# Patient Record
Sex: FEMALE | Race: WHITE | NOT HISPANIC OR LATINO | Employment: UNEMPLOYED | URBAN - METROPOLITAN AREA
[De-identification: names, ages, dates, MRNs, and addresses within clinical notes are randomized per-mention and may not be internally consistent; named-entity substitution may affect disease eponyms.]

---

## 2017-05-08 ENCOUNTER — HOSPITAL ENCOUNTER (OUTPATIENT)
Dept: OTHER | Facility: HOSPITAL | Age: 42
Setting detail: SPECIMEN
Discharge: HOME | End: 2017-05-08
Attending: INTERNAL MEDICINE | Admitting: INTERNAL MEDICINE

## 2019-08-20 ENCOUNTER — TRANSCRIBE ORDERS (OUTPATIENT)
Dept: PHYSICAL THERAPY | Facility: CLINIC | Age: 44
End: 2019-08-20

## 2019-08-20 DIAGNOSIS — M54.5 LOW BACK PAIN, UNSPECIFIED BACK PAIN LATERALITY, UNSPECIFIED CHRONICITY, WITH SCIATICA PRESENCE UNSPECIFIED: ICD-10-CM

## 2019-08-20 DIAGNOSIS — M54.2 PAIN, NECK: Primary | ICD-10-CM

## 2019-08-21 ENCOUNTER — HOSPITAL ENCOUNTER (EMERGENCY)
Facility: HOSPITAL | Age: 44
Discharge: LEFT BEFORE DISCHARGE | End: 2019-08-21
Attending: EMERGENCY MEDICINE | Admitting: EMERGENCY MEDICINE

## 2019-08-21 ENCOUNTER — APPOINTMENT (OUTPATIENT)
Dept: GENERAL RADIOLOGY | Facility: HOSPITAL | Age: 44
End: 2019-08-21

## 2019-08-21 VITALS
HEART RATE: 72 BPM | BODY MASS INDEX: 21.45 KG/M2 | HEIGHT: 63 IN | DIASTOLIC BLOOD PRESSURE: 64 MMHG | TEMPERATURE: 97.8 F | OXYGEN SATURATION: 100 % | RESPIRATION RATE: 17 BRPM | SYSTOLIC BLOOD PRESSURE: 104 MMHG | WEIGHT: 121.03 LBS

## 2019-08-21 DIAGNOSIS — R53.1 WEAKNESS: Primary | ICD-10-CM

## 2019-08-21 DIAGNOSIS — E87.20 METABOLIC ACIDOSIS: ICD-10-CM

## 2019-08-21 DIAGNOSIS — E87.6 HYPOKALEMIA: ICD-10-CM

## 2019-08-21 LAB
ALBUMIN SERPL-MCNC: 3.2 G/DL (ref 3.5–4.8)
ALBUMIN/GLOB SERPL: 1 G/DL (ref 1–1.7)
ALP SERPL-CCNC: 71 U/L (ref 32–91)
ALT SERPL W P-5'-P-CCNC: 29 U/L (ref 14–54)
AMPHET+METHAMPHET UR QL: NEGATIVE
ANION GAP SERPL CALCULATED.3IONS-SCNC: 12.5 MMOL/L (ref 5–15)
AST SERPL-CCNC: 19 U/L (ref 15–41)
BACTERIA UR QL AUTO: ABNORMAL /HPF
BARBITURATES UR QL SCN: NEGATIVE
BASOPHILS # BLD AUTO: 0.1 10*3/MM3 (ref 0–0.2)
BASOPHILS NFR BLD AUTO: 0.9 % (ref 0–1.5)
BENZODIAZ UR QL SCN: NEGATIVE
BILIRUB SERPL-MCNC: 0.8 MG/DL (ref 0.3–1.2)
BILIRUB UR QL STRIP: NEGATIVE
BUN BLD-MCNC: 13 MG/DL (ref 8–20)
BUN/CREAT SERPL: 7.6 (ref 5.4–26.2)
CALCIUM SPEC-SCNC: 11 MG/DL (ref 8.9–10.3)
CANNABINOIDS SERPL QL: NEGATIVE
CHLORIDE SERPL-SCNC: 112 MMOL/L (ref 101–111)
CLARITY UR: CLEAR
CO2 SERPL-SCNC: 14 MMOL/L (ref 22–32)
COCAINE UR QL: NEGATIVE
COLOR UR: YELLOW
CREAT BLD-MCNC: 1.7 MG/DL (ref 0.4–1)
CREAT UR-MCNC: 117.7 MG/DL
DEPRECATED RDW RBC AUTO: 49.4 FL (ref 37–54)
EOSINOPHIL # BLD AUTO: 0.2 10*3/MM3 (ref 0–0.4)
EOSINOPHIL NFR BLD AUTO: 2 % (ref 0.3–6.2)
ERYTHROCYTE [DISTWIDTH] IN BLOOD BY AUTOMATED COUNT: 15 % (ref 12.3–15.4)
GFR SERPL CREATININE-BSD FRML MDRD: 33 ML/MIN/1.73
GLOBULIN UR ELPH-MCNC: 3.2 GM/DL (ref 2.5–3.8)
GLUCOSE BLD-MCNC: 87 MG/DL (ref 65–99)
GLUCOSE UR STRIP-MCNC: NEGATIVE MG/DL
HCT VFR BLD AUTO: 32.8 % (ref 34–46.6)
HGB BLD-MCNC: 10.7 G/DL (ref 12–15.9)
HGB UR QL STRIP.AUTO: NEGATIVE
HYALINE CASTS UR QL AUTO: ABNORMAL /LPF
KETONES UR QL STRIP: NEGATIVE
LEUKOCYTE ESTERASE UR QL STRIP.AUTO: NEGATIVE
LYMPHOCYTES # BLD AUTO: 2.9 10*3/MM3 (ref 0.7–3.1)
LYMPHOCYTES NFR BLD AUTO: 36.9 % (ref 19.6–45.3)
MAGNESIUM SERPL-MCNC: 2.3 MG/DL (ref 1.8–2.5)
MCH RBC QN AUTO: 29.9 PG (ref 26.6–33)
MCHC RBC AUTO-ENTMCNC: 32.4 G/DL (ref 31.5–35.7)
MCV RBC AUTO: 92.2 FL (ref 79–97)
METHADONE UR QL SCN: NEGATIVE
MONOCYTES # BLD AUTO: 0.5 10*3/MM3 (ref 0.1–0.9)
MONOCYTES NFR BLD AUTO: 6.3 % (ref 5–12)
NEUTROPHILS # BLD AUTO: 4.2 10*3/MM3 (ref 1.7–7)
NEUTROPHILS NFR BLD AUTO: 53.9 % (ref 42.7–76)
NITRITE UR QL STRIP: NEGATIVE
NRBC BLD AUTO-RTO: 0 /100 WBC (ref 0–0.2)
OPIATES UR QL: NEGATIVE
PCP UR QL SCN: NEGATIVE
PH UR STRIP.AUTO: 6.5 [PH] (ref 5–8)
PLATELET # BLD AUTO: 361 10*3/MM3 (ref 140–450)
PMV BLD AUTO: 8.2 FL (ref 6–12)
POTASSIUM BLD-SCNC: 2.5 MMOL/L (ref 3.6–5.1)
PROT SERPL-MCNC: 6.4 G/DL (ref 6.1–7.9)
PROT UR QL STRIP: ABNORMAL
RBC # BLD AUTO: 3.56 10*6/MM3 (ref 3.77–5.28)
RBC # UR: ABNORMAL /HPF
REF LAB TEST METHOD: ABNORMAL
SODIUM BLD-SCNC: 136 MMOL/L (ref 136–144)
SP GR UR STRIP: 1.01 (ref 1–1.03)
SQUAMOUS #/AREA URNS HPF: ABNORMAL /HPF
TROPONIN I SERPL-MCNC: <0.03 NG/ML (ref 0–0.03)
UROBILINOGEN UR QL STRIP: ABNORMAL
WBC NRBC COR # BLD: 7.7 10*3/MM3 (ref 3.4–10.8)
WBC UR QL AUTO: ABNORMAL /HPF

## 2019-08-21 PROCEDURE — 80307 DRUG TEST PRSMV CHEM ANLYZR: CPT | Performed by: EMERGENCY MEDICINE

## 2019-08-21 PROCEDURE — 84484 ASSAY OF TROPONIN QUANT: CPT | Performed by: EMERGENCY MEDICINE

## 2019-08-21 PROCEDURE — 85025 COMPLETE CBC W/AUTO DIFF WBC: CPT | Performed by: EMERGENCY MEDICINE

## 2019-08-21 PROCEDURE — 93005 ELECTROCARDIOGRAM TRACING: CPT | Performed by: EMERGENCY MEDICINE

## 2019-08-21 PROCEDURE — 83735 ASSAY OF MAGNESIUM: CPT | Performed by: EMERGENCY MEDICINE

## 2019-08-21 PROCEDURE — 99284 EMERGENCY DEPT VISIT MOD MDM: CPT

## 2019-08-21 PROCEDURE — 81001 URINALYSIS AUTO W/SCOPE: CPT | Performed by: EMERGENCY MEDICINE

## 2019-08-21 PROCEDURE — 82570 ASSAY OF URINE CREATININE: CPT | Performed by: EMERGENCY MEDICINE

## 2019-08-21 PROCEDURE — 80053 COMPREHEN METABOLIC PANEL: CPT | Performed by: EMERGENCY MEDICINE

## 2019-08-21 RX ORDER — POTASSIUM CHLORIDE 20 MEQ/1
40 TABLET, EXTENDED RELEASE ORAL ONCE
Status: COMPLETED | OUTPATIENT
Start: 2019-08-21 | End: 2019-08-21

## 2019-08-21 RX ORDER — GABAPENTIN 600 MG/1
600 TABLET ORAL 2 TIMES DAILY
COMMUNITY
End: 2019-08-27 | Stop reason: HOSPADM

## 2019-08-21 RX ORDER — BUPRENORPHINE AND NALOXONE 8; 2 MG/1; MG/1
1 FILM, SOLUBLE BUCCAL; SUBLINGUAL 3 TIMES DAILY
Status: ON HOLD | COMMUNITY
Start: 2018-02-08 | End: 2019-08-27 | Stop reason: SDUPTHER

## 2019-08-21 RX ORDER — LAMOTRIGINE 100 MG/1
100 TABLET ORAL DAILY
COMMUNITY
Start: 2018-02-08

## 2019-08-21 RX ORDER — SODIUM CHLORIDE 0.9 % (FLUSH) 0.9 %
10 SYRINGE (ML) INJECTION AS NEEDED
Status: DISCONTINUED | OUTPATIENT
Start: 2019-08-21 | End: 2019-08-21 | Stop reason: HOSPADM

## 2019-08-21 RX ORDER — OLANZAPINE 10 MG/1
40 TABLET ORAL NIGHTLY
Status: ON HOLD | COMMUNITY
End: 2019-08-27 | Stop reason: SDUPTHER

## 2019-08-21 RX ORDER — CIPROFLOXACIN 500 MG/1
500 TABLET, FILM COATED ORAL DAILY
COMMUNITY
Start: 2019-08-19 | End: 2019-08-27 | Stop reason: HOSPADM

## 2019-08-21 RX ORDER — ACETAMINOPHEN 500 MG
1000 TABLET ORAL ONCE
Status: COMPLETED | OUTPATIENT
Start: 2019-08-21 | End: 2019-08-21

## 2019-08-21 RX ORDER — ACETAMINOPHEN 500 MG
TABLET ORAL
Status: COMPLETED
Start: 2019-08-21 | End: 2019-08-21

## 2019-08-21 RX ADMIN — SODIUM CHLORIDE 1000 ML: 900 INJECTION, SOLUTION INTRAVENOUS at 13:27

## 2019-08-21 RX ADMIN — Medication 10 ML: at 13:21

## 2019-08-21 RX ADMIN — ACETAMINOPHEN 1000 MG: 500 TABLET, FILM COATED ORAL at 13:36

## 2019-08-21 RX ADMIN — POTASSIUM CHLORIDE 40 MEQ: 20 TABLET, EXTENDED RELEASE ORAL at 14:51

## 2019-08-21 RX ADMIN — Medication 1000 MG: at 13:36

## 2019-08-21 NOTE — ED NOTES
Pt reported to RN that she did not want any further testing and would like to sign out AMA. Dr Patterson aware and at bedside to discuss with pt. Pt verbalized understanding and still wishes to leave AMA     Nica Garcia RN  08/21/19 0692

## 2019-08-21 NOTE — ED NOTES
Pt c/o lower back pain for 2 weeks, recently diagnosed with UTI and started on abx for same. Family member reports frequent falling and excess sedation. Pt. On multiple psychiatric medications from [facility] per family member.     Nica Garcia, RN  08/21/19 [id number]

## 2019-08-21 NOTE — ED PROVIDER NOTES
Subjective   Chief complaint weakness I think I still had pneumonia    History of present illness 44-year-old female with a history of bipolar who states she is been on Cipro for a few days because she was diagnosed pneumonia on Friday and a UTI.  Complains of generalized weakness and coughing she denies any fever chills no vomiting or diarrhea no black or bloody stool.  Family states she is on somewhat psych medication she stays sleepy all the time.  Patient denies any recent long car ride plane remobilization foreign travels no ill exposures.  Nothing makes his better worse moderate degree but continuous for the last several days.  Denies dysuria frequency or change in urine output.  Denies drug use no alcohol use and no injury            Review of Systems   Constitutional: Positive for fatigue. Negative for chills and fever.   HENT: Negative for congestion and sinus pressure.    Eyes: Negative for photophobia and visual disturbance.   Respiratory: Positive for cough. Negative for chest tightness and shortness of breath.    Cardiovascular: Negative for chest pain and leg swelling.   Gastrointestinal: Negative for abdominal pain and vomiting.   Endocrine: Negative for cold intolerance and heat intolerance.   Genitourinary: Negative for difficulty urinating and dysuria.   Musculoskeletal: Negative for arthralgias and back pain.   Skin: Negative for color change.   Neurological: Positive for weakness and light-headedness. Negative for dizziness and speech difficulty.   Psychiatric/Behavioral: Negative for agitation and behavioral problems.       Past Medical History:   Diagnosis Date   • ADHD    • Anxiety    • Bipolar 1 disorder (CMS/HCC)    • Depression    • PTSD (post-traumatic stress disorder)    • Substance abuse (CMS/[facility])    • Vitamin D deficiency        Allergies   Allergen Reactions   • Erythromycin Base Hives       Past Surgical History:   Procedure Laterality Date   •  SECTION     • DILATATION AND  CURETTAGE      x2   • TONSILLECTOMY         History reviewed. No pertinent family history.    Social History     Socioeconomic History   • Marital status: Single     Spouse name: Not on file   • Number of children: Not on file   • Years of education: Not on file   • Highest education level: Not on file   Tobacco Use   • Smoking status: Current Every Day Smoker     Packs/day: 2.00     Types: Cigarettes   Substance and Sexual Activity   • Alcohol use: No     Frequency: Never   • Drug use: No     Prior to Admission medications    Medication Sig Start Date End Date Taking? Authorizing Provider   buprenorphine-naloxone (SUBOXONE) 12-3 MG film sublingual film Take 3 films by mouth Daily. 2/8/18  Yes Laura Garcia MD   Cholecalciferol (VITAMIN D3) 5000 units capsule capsule Take 5,000 Units by mouth Daily.   Yes Laura Garcia MD   ciprofloxacin (CIPRO) 500 MG tablet Take 500 mg by mouth 2 (Two) Times a Day.   Yes Laura Garcia MD   gabapentin (NEURONTIN) 600 MG tablet Take 600 mg by mouth 2 (Two) Times a Day.   Yes Laura Garcia MD   lamoTRIgine (LAMICTAL) 100 MG tablet Take 100 mg by mouth Daily. 2/8/18  Yes Laura Garcia MD   OLANZapine (zyPREXA) 10 MG tablet Take 20 mg by mouth Every Night.   Yes Laura Garcia MD   Venlafaxine HCl (EFFEXOR XR PO) Take 225 mg by mouth every night at bedtime. 2/8/18  Yes Laura Garcia MD           Objective   Physical Exam  44-year-old female awake alert no acute distress HEENT extraocular muscles intact pupils equal react no photophobia disks sharp mouth is clear neck is supple no adenopathy no J no bruits lungs clear no retraction no CVA tenderness no spinal tenderness to palpation percussion heart regular without murmur and was soft nontender good bowel sounds no masses extremities pulses are equal therapy lower extremities no edema cords or Homans sign ribs DVT.  Patient's awake alert orientated x3 no facial symmetry normal  speech without focal weakness.  No rash no petechiae no purpura  Procedures           ED Course      Results for orders placed or performed during the hospital encounter of 08/21/19   Comprehensive Metabolic Panel   Result Value Ref Range    Glucose 87 65 - 99 mg/dL    BUN 13 8 - 20 mg/dL    Creatinine 1.70 (H) 0.40 - 1.00 mg/dL    Sodium 136 136 - 144 mmol/L    Potassium 2.5 (C) 3.6 - 5.1 mmol/L    Chloride 112 (H) 101 - 111 mmol/L    CO2 14.0 (L) 22.0 - 32.0 mmol/L    Calcium 11.0 (H) 8.9 - 10.3 mg/dL    Total Protein 6.4 6.1 - 7.9 g/dL    Albumin 3.20 (L) 3.50 - 4.80 g/dL    ALT (SGPT) 29 14 - 54 U/L    AST (SGOT) 19 15 - 41 U/L    Alkaline Phosphatase 71 32 - 91 U/L    Total Bilirubin 0.8 0.3 - 1.2 mg/dL    eGFR Non African Amer 33 (L) >60 mL/min/1.73    Globulin 3.2 2.5 - 3.8 gm/dL    A/G Ratio 1.0 1.0 - 1.7 g/dL    BUN/Creatinine Ratio 7.6 5.4 - 26.2    Anion Gap 12.5 5.0 - 15.0 mmol/L   Urinalysis With Culture If Indicated - Urine, Clean Catch   Result Value Ref Range    Color, UA Yellow Yellow, Straw    Appearance, UA Clear Clear    pH, UA 6.5 5.0 - 8.0    Specific Gravity, UA 1.015 1.005 - 1.030    Glucose, UA Negative Negative    Ketones, UA Negative Negative    Bilirubin, UA Negative Negative    Blood, UA Negative Negative    Protein,  mg/dL (2+) (A) Negative    Leuk Esterase, UA Negative Negative    Nitrite, UA Negative Negative    Urobilinogen, UA 0.2 E.U./dL 0.2 - 1.0 E.U./dL   Troponin   Result Value Ref Range    Troponin I <0.030 0.000 - 0.030 ng/mL   Urine Drug Screen - Urine, Clean Catch   Result Value Ref Range    Barbiturates Screen, Urine Negative Negative    Benzodiazepine Screen, Urine Negative Negative    Cocaine Screen, Urine Negative Negative    Opiate Screen Negative Negative    THC, Screen, Urine Negative Negative    Methadone Screen, Urine Negative Negative    Amphetamine Screen, Urine Negative Negative    Creatinine, Urine 117.7 mg/dL    Phencyclidine (PCP), Urine Negative  Negative   Magnesium   Result Value Ref Range    Magnesium 2.3 1.8 - 2.5 mg/dL   CBC Auto Differential   Result Value Ref Range    WBC 7.70 3.40 - 10.80 10*3/mm3    RBC 3.56 (L) 3.77 - 5.28 10*6/mm3    Hemoglobin 10.7 (L) 12.0 - 15.9 g/dL    Hematocrit 32.8 (L) 34.0 - 46.6 %    MCV 92.2 79.0 - 97.0 fL    MCH 29.9 26.6 - 33.0 pg    MCHC 32.4 31.5 - 35.7 g/dL    RDW 15.0 12.3 - 15.4 %    RDW-SD 49.4 37.0 - 54.0 fl    MPV 8.2 6.0 - 12.0 fL    Platelets 361 140 - 450 10*3/mm3    Neutrophil % 53.9 42.7 - 76.0 %    Lymphocyte % 36.9 19.6 - 45.3 %    Monocyte % 6.3 5.0 - 12.0 %    Eosinophil % 2.0 0.3 - 6.2 %    Basophil % 0.9 0.0 - 1.5 %    Neutrophils, Absolute 4.20 1.70 - 7.00 10*3/mm3    Lymphocytes, Absolute 2.90 0.70 - 3.10 10*3/mm3    Monocytes, Absolute 0.50 0.10 - 0.90 10*3/mm3    Eosinophils, Absolute 0.20 0.00 - 0.40 10*3/mm3    Basophils, Absolute 0.10 0.00 - 0.20 10*3/mm3    nRBC 0.0 0.0 - 0.2 /100 WBC   Urinalysis, Microscopic Only - Urine, Clean Catch   Result Value Ref Range    RBC, UA None Seen None Seen /HPF    WBC, UA 3-5 (A) None Seen /HPF    Bacteria, UA None Seen None Seen /HPF    Squamous Epithelial Cells, UA 0-2 None Seen, 0-2 /HPF    Hyaline Casts, UA None Seen None Seen /LPF    Methodology Manual Light Microscopy      No radiology results for the last day  Medications   sodium chloride 0.9 % flush 10 mL (10 mL Intravenous Given 8/21/19 1321)   sodium chloride 0.9 % bolus 1,000 mL (0 mL Intravenous Stopped 8/21/19 1421)   acetaminophen (TYLENOL) tablet 1,000 mg (1,000 mg Oral Given 8/21/19 1336)   potassium chloride (K-DUR,KLOR-CON) CR tablet 40 mEq (40 mEq Oral Given 8/21/19 1451)         EKG my interpretation normal sinus rhythm rate 64 normal axis no hypertrophy QTC of 398 normal EKG          MDM  Number of Diagnoses or Management Options  Hypokalemia:   Metabolic acidosis:   Weakness:   Diagnosis management comments: Medical decision make.  Patient IV established given liter saline had the  above exam and evaluation.  Patient CBC unremarkable creatinine had a 1.7 testing was 2.5 CO2 was 14 hemoglobin 10.2.  Patient left before chest x-ray was done.  EKG was normal troponin normal.  Patient given potassium 40 mg p.o.  Patient was recommend to be admitted to the hospital but she signed out AGAINST MEDICAL ADVICE states she will not stay she wants to take care of her kids.  She was made aware of the potential complications she is acidotic is a low potassium and this is significant problem and needs further investigation.  She voiced understanding but again refused admission to the hospital despite these warnings.  He signed out AGAINST MEDICAL ADVICE.        Final diagnoses:   Weakness   Hypokalemia   Metabolic acidosis            Elieser Patterson MD  08/21/19 5902

## 2019-08-23 ENCOUNTER — HOSPITAL ENCOUNTER (OUTPATIENT)
Dept: CARDIOLOGY | Facility: HOSPITAL | Age: 44
Setting detail: OBSERVATION
Discharge: HOME | End: 2019-08-23

## 2019-08-23 ENCOUNTER — APPOINTMENT (OUTPATIENT)
Dept: GENERAL RADIOLOGY | Facility: HOSPITAL | Age: 44
End: 2019-08-23

## 2019-08-23 ENCOUNTER — APPOINTMENT (OUTPATIENT)
Dept: CT IMAGING | Facility: HOSPITAL | Age: 44
End: 2019-08-23

## 2019-08-23 ENCOUNTER — HOSPITAL ENCOUNTER (OUTPATIENT)
Facility: HOSPITAL | Age: 44
Setting detail: OBSERVATION
Discharge: HOME | End: 2019-08-27
Attending: HOSPITALIST | Admitting: INTERNAL MEDICINE

## 2019-08-23 ENCOUNTER — APPOINTMENT (OUTPATIENT)
Dept: CARDIOLOGY | Facility: HOSPITAL | Age: 44
End: 2019-08-23

## 2019-08-23 VITALS
BODY MASS INDEX: 22.32 KG/M2 | HEIGHT: 63 IN | WEIGHT: 126 LBS | DIASTOLIC BLOOD PRESSURE: 59 MMHG | SYSTOLIC BLOOD PRESSURE: 114 MMHG

## 2019-08-23 DIAGNOSIS — N17.9 ACUTE RENAL FAILURE, UNSPECIFIED ACUTE RENAL FAILURE TYPE (HCC): ICD-10-CM

## 2019-08-23 DIAGNOSIS — N39.0 ACUTE UTI: ICD-10-CM

## 2019-08-23 DIAGNOSIS — E87.6 HYPOKALEMIA: ICD-10-CM

## 2019-08-23 DIAGNOSIS — R55 SYNCOPE AND COLLAPSE: Primary | ICD-10-CM

## 2019-08-23 DIAGNOSIS — S02.2XXA CLOSED FRACTURE OF NASAL BONE, INITIAL ENCOUNTER: ICD-10-CM

## 2019-08-23 DIAGNOSIS — E87.20 METABOLIC ACIDOSIS: ICD-10-CM

## 2019-08-23 DIAGNOSIS — T79.6XXA TRAUMATIC RHABDOMYOLYSIS, INITIAL ENCOUNTER (HCC): ICD-10-CM

## 2019-08-23 LAB
ALBUMIN SERPL-MCNC: 3.3 G/DL (ref 3.5–4.8)
ALBUMIN/GLOB SERPL: 1.1 G/DL (ref 1–1.7)
ALP SERPL-CCNC: 73 U/L (ref 32–91)
ALT SERPL W P-5'-P-CCNC: 27 U/L (ref 14–54)
AMPHET+METHAMPHET UR QL: NEGATIVE
AMPHET+METHAMPHET UR QL: NEGATIVE
ANION GAP SERPL CALCULATED.3IONS-SCNC: 11 MMOL/L (ref 5–15)
ANION GAP SERPL CALCULATED.3IONS-SCNC: 12 MMOL/L (ref 5–15)
APAP SERPL-MCNC: <10 MCG/ML (ref 10–30)
ARTERIAL PATENCY WRIST A: POSITIVE
AST SERPL-CCNC: 22 U/L (ref 15–41)
ATMOSPHERIC PRESS: ABNORMAL MM[HG]
B-HCG UR QL: NEGATIVE
BACTERIA UR QL AUTO: ABNORMAL /HPF
BARBITURATES UR QL SCN: NEGATIVE
BARBITURATES UR QL SCN: NEGATIVE
BASE EXCESS BLDA CALC-SCNC: -14.1 MMOL/L (ref 0–3)
BASOPHILS # BLD AUTO: 0.1 10*3/MM3 (ref 0–0.2)
BASOPHILS # BLD AUTO: 0.1 10*3/MM3 (ref 0–0.2)
BASOPHILS NFR BLD AUTO: 1 % (ref 0–1.5)
BASOPHILS NFR BLD AUTO: 1 % (ref 0–1.5)
BDY SITE: ABNORMAL
BENZODIAZ UR QL SCN: NEGATIVE
BENZODIAZ UR QL SCN: NEGATIVE
BH CV ECHO MEAS - % IVS THICK: 22.1 %
BH CV ECHO MEAS - % LVPW THICK: 25.9 %
BH CV ECHO MEAS - ACS: 1.8 CM
BH CV ECHO MEAS - AO MAX PG (FULL): 1.5 MMHG
BH CV ECHO MEAS - AO MAX PG: 8.1 MMHG
BH CV ECHO MEAS - AO MEAN PG (FULL): 0.82 MMHG
BH CV ECHO MEAS - AO MEAN PG: 4.2 MMHG
BH CV ECHO MEAS - AO ROOT AREA (BSA CORRECTED): 1.6
BH CV ECHO MEAS - AO ROOT AREA: 5.2 CM^2
BH CV ECHO MEAS - AO ROOT DIAM: 2.6 CM
BH CV ECHO MEAS - AO V2 MAX: 142.3 CM/SEC
BH CV ECHO MEAS - AO V2 MEAN: 96.6 CM/SEC
BH CV ECHO MEAS - AO V2 VTI: 28.4 CM
BH CV ECHO MEAS - AVA(I,A): 1.8 CM^2
BH CV ECHO MEAS - AVA(I,D): 1.8 CM^2
BH CV ECHO MEAS - AVA(V,A): 1.8 CM^2
BH CV ECHO MEAS - AVA(V,D): 1.8 CM^2
BH CV ECHO MEAS - BSA(HAYCOCK): 1.6 M^2
BH CV ECHO MEAS - BSA: 1.6 M^2
BH CV ECHO MEAS - BZI_BMI: 22.3 KILOGRAMS/M^2
BH CV ECHO MEAS - BZI_METRIC_HEIGHT: 160 CM
BH CV ECHO MEAS - BZI_METRIC_WEIGHT: 57.2 KG
BH CV ECHO MEAS - EDV(CUBED): 74.3 ML
BH CV ECHO MEAS - EDV(MOD-SP2): 49.7 ML
BH CV ECHO MEAS - EDV(MOD-SP4): 61.7 ML
BH CV ECHO MEAS - EDV(TEICH): 78.7 ML
BH CV ECHO MEAS - EF(CUBED): 68 %
BH CV ECHO MEAS - EF(MOD-BP): 63 %
BH CV ECHO MEAS - EF(MOD-SP2): 64.5 %
BH CV ECHO MEAS - EF(MOD-SP4): 55.7 %
BH CV ECHO MEAS - EF(TEICH): 59.9 %
BH CV ECHO MEAS - ESV(CUBED): 23.8 ML
BH CV ECHO MEAS - ESV(MOD-SP2): 17.7 ML
BH CV ECHO MEAS - ESV(MOD-SP4): 27.3 ML
BH CV ECHO MEAS - ESV(TEICH): 31.5 ML
BH CV ECHO MEAS - FS: 31.6 %
BH CV ECHO MEAS - IVS/LVPW: 0.96
BH CV ECHO MEAS - IVSD: 0.96 CM
BH CV ECHO MEAS - IVSS: 1.2 CM
BH CV ECHO MEAS - LA DIMENSION(2D): 3 CM
BH CV ECHO MEAS - LV DIASTOLIC VOL/BSA (35-75): 38.8 ML/M^2
BH CV ECHO MEAS - LV MASS(C)D: 133.3 GRAMS
BH CV ECHO MEAS - LV MASS(C)DI: 83.9 GRAMS/M^2
BH CV ECHO MEAS - LV MASS(C)S: 104.9 GRAMS
BH CV ECHO MEAS - LV MASS(C)SI: 66 GRAMS/M^2
BH CV ECHO MEAS - LV MAX PG: 6.6 MMHG
BH CV ECHO MEAS - LV MEAN PG: 3.3 MMHG
BH CV ECHO MEAS - LV SYSTOLIC VOL/BSA (12-30): 17.2 ML/M^2
BH CV ECHO MEAS - LV V1 MAX: 128.1 CM/SEC
BH CV ECHO MEAS - LV V1 MEAN: 83.8 CM/SEC
BH CV ECHO MEAS - LV V1 VTI: 26.4 CM
BH CV ECHO MEAS - LVIDD: 4.2 CM
BH CV ECHO MEAS - LVIDS: 2.9 CM
BH CV ECHO MEAS - LVOT AREA: 2 CM^2
BH CV ECHO MEAS - LVOT DIAM: 1.6 CM
BH CV ECHO MEAS - LVPWD: 1 CM
BH CV ECHO MEAS - LVPWS: 1.3 CM
BH CV ECHO MEAS - MV A MAX VEL: 77.7 CM/SEC
BH CV ECHO MEAS - MV DEC SLOPE: 404.6 CM/SEC^2
BH CV ECHO MEAS - MV DEC TIME: 0.23 SEC
BH CV ECHO MEAS - MV E MAX VEL: 46.5 CM/SEC
BH CV ECHO MEAS - MV E/A: 0.6
BH CV ECHO MEAS - MV MAX PG: 4.1 MMHG
BH CV ECHO MEAS - MV MEAN PG: 1.9 MMHG
BH CV ECHO MEAS - MV V2 MAX: 101.2 CM/SEC
BH CV ECHO MEAS - MV V2 MEAN: 66.7 CM/SEC
BH CV ECHO MEAS - MV V2 VTI: 23.3 CM
BH CV ECHO MEAS - MVA(VTI): 2.2 CM^2
BH CV ECHO MEAS - PA ACC TIME: 0.1 SEC
BH CV ECHO MEAS - PA MAX PG (FULL): 2.4 MMHG
BH CV ECHO MEAS - PA MAX PG: 4.7 MMHG
BH CV ECHO MEAS - PA MEAN PG (FULL): 1.3 MMHG
BH CV ECHO MEAS - PA MEAN PG: 2.6 MMHG
BH CV ECHO MEAS - PA PR(ACCEL): 32.3 MMHG
BH CV ECHO MEAS - PA V2 MAX: 108.5 CM/SEC
BH CV ECHO MEAS - PA V2 MEAN: 75.8 CM/SEC
BH CV ECHO MEAS - PA V2 VTI: 26.7 CM
BH CV ECHO MEAS - PULM A REVS DUR: 0.08 SEC
BH CV ECHO MEAS - PULM A REVS VEL: 36.1 CM/SEC
BH CV ECHO MEAS - PULM DIAS VEL: 33.5 CM/SEC
BH CV ECHO MEAS - PULM S/D: 1.6
BH CV ECHO MEAS - PULM SYS VEL: 52.4 CM/SEC
BH CV ECHO MEAS - RAP SYSTOLE: 8 MMHG
BH CV ECHO MEAS - RV MAX PG: 2.3 MMHG
BH CV ECHO MEAS - RV MEAN PG: 1.3 MMHG
BH CV ECHO MEAS - RV V1 MAX: 75.9 CM/SEC
BH CV ECHO MEAS - RV V1 MEAN: 55.3 CM/SEC
BH CV ECHO MEAS - RV V1 VTI: 17.8 CM
BH CV ECHO MEAS - RVDD: 2.5 CM
BH CV ECHO MEAS - RVSP: 32 MMHG
BH CV ECHO MEAS - SI(AO): 92.7 ML/M^2
BH CV ECHO MEAS - SI(CUBED): 31.8 ML/M^2
BH CV ECHO MEAS - SI(LVOT): 32.8 ML/M^2
BH CV ECHO MEAS - SI(MOD-SP2): 20.2 ML/M^2
BH CV ECHO MEAS - SI(MOD-SP4): 21.6 ML/M^2
BH CV ECHO MEAS - SI(TEICH): 29.7 ML/M^2
BH CV ECHO MEAS - SV(AO): 147.3 ML
BH CV ECHO MEAS - SV(CUBED): 50.5 ML
BH CV ECHO MEAS - SV(LVOT): 52.2 ML
BH CV ECHO MEAS - SV(MOD-SP2): 32.1 ML
BH CV ECHO MEAS - SV(MOD-SP4): 34.4 ML
BH CV ECHO MEAS - SV(TEICH): 47.2 ML
BH CV ECHO MEAS - TR MAX VEL: 244 CM/SEC
BILIRUB SERPL-MCNC: 0.4 MG/DL (ref 0.3–1.2)
BILIRUB UR QL STRIP: NEGATIVE
BUN BLD-MCNC: 12 MG/DL (ref 8–20)
BUN BLD-MCNC: 14 MG/DL (ref 8–20)
BUN/CREAT SERPL: 8.2 (ref 5.4–26.2)
BUN/CREAT SERPL: 8.6 (ref 5.4–26.2)
CALCIUM SPEC-SCNC: 11 MG/DL (ref 8.9–10.3)
CALCIUM SPEC-SCNC: 9.8 MG/DL (ref 8.9–10.3)
CANNABINOIDS SERPL QL: NEGATIVE
CANNABINOIDS SERPL QL: NEGATIVE
CHLORIDE SERPL-SCNC: 119 MMOL/L (ref 101–111)
CHLORIDE SERPL-SCNC: 120 MMOL/L (ref 101–111)
CK SERPL-CCNC: 475 U/L (ref 20–180)
CLARITY UR: ABNORMAL
CO2 BLDA-SCNC: 14.1 MMOL/L (ref 22–29)
CO2 SERPL-SCNC: 12 MMOL/L (ref 22–32)
CO2 SERPL-SCNC: 15 MMOL/L (ref 22–32)
COCAINE UR QL: NEGATIVE
COCAINE UR QL: NEGATIVE
COLOR UR: YELLOW
CREAT BLD-MCNC: 1.4 MG/DL (ref 0.4–1)
CREAT BLD-MCNC: 1.7 MG/DL (ref 0.4–1)
CREAT UR-MCNC: 38 MG/DL
CREAT UR-MCNC: 74.8 MG/DL
D-LACTATE SERPL-SCNC: 0.6 MMOL/L (ref 0.5–2)
D-LACTATE SERPL-SCNC: 0.8 MMOL/L (ref 0.5–2.2)
DEPRECATED RDW RBC AUTO: 49.4 FL (ref 37–54)
DEPRECATED RDW RBC AUTO: 52.1 FL (ref 37–54)
EOSINOPHIL # BLD AUTO: 0.2 10*3/MM3 (ref 0–0.4)
EOSINOPHIL # BLD AUTO: 0.2 10*3/MM3 (ref 0–0.4)
EOSINOPHIL NFR BLD AUTO: 1.9 % (ref 0.3–6.2)
EOSINOPHIL NFR BLD AUTO: 1.9 % (ref 0.3–6.2)
ERYTHROCYTE [DISTWIDTH] IN BLOOD BY AUTOMATED COUNT: 14.8 % (ref 12.3–15.4)
ERYTHROCYTE [DISTWIDTH] IN BLOOD BY AUTOMATED COUNT: 15.4 % (ref 12.3–15.4)
ETHANOL UR QL: <0.01 %
GFR SERPL CREATININE-BSD FRML MDRD: 33 ML/MIN/1.73
GFR SERPL CREATININE-BSD FRML MDRD: 41 ML/MIN/1.73
GLOBULIN UR ELPH-MCNC: 3 GM/DL (ref 2.5–3.8)
GLUCOSE BLD-MCNC: 81 MG/DL (ref 65–99)
GLUCOSE BLD-MCNC: 88 MG/DL (ref 65–99)
GLUCOSE UR STRIP-MCNC: NEGATIVE MG/DL
HCO3 BLDA-SCNC: 13.1 MMOL/L (ref 21–28)
HCT VFR BLD AUTO: 33.1 % (ref 34–46.6)
HCT VFR BLD AUTO: 34 % (ref 34–46.6)
HEMODILUTION: NO
HGB BLD-MCNC: 10.6 G/DL (ref 12–15.9)
HGB BLD-MCNC: 10.8 G/DL (ref 12–15.9)
HGB UR QL STRIP.AUTO: ABNORMAL
HOROWITZ INDEX BLD+IHG-RTO: 21 %
HYALINE CASTS UR QL AUTO: ABNORMAL /LPF
KETONES UR QL STRIP: NEGATIVE
KETONES UR QL STRIP: NEGATIVE
LEUKOCYTE ESTERASE UR QL STRIP.AUTO: ABNORMAL
LITHIUM SERPL-SCNC: 0.4 MMOL/L (ref 1–1.2)
LV EF 2D ECHO EST: 60 %
LYMPHOCYTES # BLD AUTO: 3.2 10*3/MM3 (ref 0.7–3.1)
LYMPHOCYTES # BLD AUTO: 3.4 10*3/MM3 (ref 0.7–3.1)
LYMPHOCYTES NFR BLD AUTO: 34.6 % (ref 19.6–45.3)
LYMPHOCYTES NFR BLD AUTO: 36.9 % (ref 19.6–45.3)
MAXIMAL PREDICTED HEART RATE: 178 BPM
MCH RBC QN AUTO: 29.9 PG (ref 26.6–33)
MCH RBC QN AUTO: 30.1 PG (ref 26.6–33)
MCHC RBC AUTO-ENTMCNC: 31.7 G/DL (ref 31.5–35.7)
MCHC RBC AUTO-ENTMCNC: 32.1 G/DL (ref 31.5–35.7)
MCV RBC AUTO: 93.7 FL (ref 79–97)
MCV RBC AUTO: 94.2 FL (ref 79–97)
METHADONE UR QL SCN: NEGATIVE
METHADONE UR QL SCN: NEGATIVE
MODALITY: ABNORMAL
MONOCYTES # BLD AUTO: 0.5 10*3/MM3 (ref 0.1–0.9)
MONOCYTES # BLD AUTO: 0.5 10*3/MM3 (ref 0.1–0.9)
MONOCYTES NFR BLD AUTO: 5.1 % (ref 5–12)
MONOCYTES NFR BLD AUTO: 5.2 % (ref 5–12)
MYOGLOBIN SERPL-MCNC: 309.5 NG/ML (ref 0–70)
NEUTROPHILS # BLD AUTO: 5 10*3/MM3 (ref 1.7–7)
NEUTROPHILS # BLD AUTO: 5.3 10*3/MM3 (ref 1.7–7)
NEUTROPHILS NFR BLD AUTO: 55 % (ref 42.7–76)
NEUTROPHILS NFR BLD AUTO: 57.4 % (ref 42.7–76)
NITRITE UR QL STRIP: NEGATIVE
NRBC BLD AUTO-RTO: 0.1 /100 WBC (ref 0–0.2)
NRBC BLD AUTO-RTO: 0.1 /100 WBC (ref 0–0.2)
OPIATES UR QL: NEGATIVE
OPIATES UR QL: NEGATIVE
PCO2 BLDA: 34.2 MM HG (ref 35–48)
PCP UR QL SCN: NEGATIVE
PCP UR QL SCN: NEGATIVE
PH BLDA: 7.19 PH UNITS (ref 7.35–7.45)
PH UR STRIP.AUTO: 6.5 [PH] (ref 5–8)
PLATELET # BLD AUTO: 327 10*3/MM3 (ref 140–450)
PLATELET # BLD AUTO: 346 10*3/MM3 (ref 140–450)
PMV BLD AUTO: 8.3 FL (ref 6–12)
PMV BLD AUTO: 8.3 FL (ref 6–12)
PO2 BLDA: 89.3 MM HG (ref 83–108)
POTASSIUM BLD-SCNC: 3 MMOL/L (ref 3.6–5.1)
POTASSIUM BLD-SCNC: 3 MMOL/L (ref 3.6–5.1)
PROT SERPL-MCNC: 6.3 G/DL (ref 6.1–7.9)
PROT UR QL STRIP: ABNORMAL
RBC # BLD AUTO: 3.53 10*6/MM3 (ref 3.77–5.28)
RBC # BLD AUTO: 3.61 10*6/MM3 (ref 3.77–5.28)
RBC # UR: ABNORMAL /HPF
REF LAB TEST METHOD: ABNORMAL
SALICYLATES SERPL-MCNC: <4 MG/DL (ref 0–30)
SAO2 % BLDCOA: 94.5 % (ref 94–98)
SODIUM BLD-SCNC: 140 MMOL/L (ref 136–144)
SODIUM BLD-SCNC: 143 MMOL/L (ref 136–144)
SP GR UR STRIP: 1.01 (ref 1–1.03)
SQUAMOUS #/AREA URNS HPF: ABNORMAL /HPF
STRESS TARGET HR: 151 BPM
TROPONIN I SERPL-MCNC: <0.03 NG/ML (ref 0–0.03)
UROBILINOGEN UR QL STRIP: ABNORMAL
WBC NRBC COR # BLD: 9.1 10*3/MM3 (ref 3.4–10.8)
WBC NRBC COR # BLD: 9.3 10*3/MM3 (ref 3.4–10.8)
WBC UR QL AUTO: ABNORMAL /HPF

## 2019-08-23 PROCEDURE — 80307 DRUG TEST PRSMV CHEM ANLYZR: CPT | Performed by: NURSE PRACTITIONER

## 2019-08-23 PROCEDURE — 96375 TX/PRO/DX INJ NEW DRUG ADDON: CPT

## 2019-08-23 PROCEDURE — 74018 RADEX ABDOMEN 1 VIEW: CPT

## 2019-08-23 PROCEDURE — G0378 HOSPITAL OBSERVATION PER HR: HCPCS

## 2019-08-23 PROCEDURE — 84484 ASSAY OF TROPONIN QUANT: CPT | Performed by: NURSE PRACTITIONER

## 2019-08-23 PROCEDURE — 94640 AIRWAY INHALATION TREATMENT: CPT

## 2019-08-23 PROCEDURE — 70450 CT HEAD/BRAIN W/O DYE: CPT

## 2019-08-23 PROCEDURE — 93306 TTE W/DOPPLER COMPLETE: CPT | Performed by: INTERNAL MEDICINE

## 2019-08-23 PROCEDURE — 80307 DRUG TEST PRSMV CHEM ANLYZR: CPT | Performed by: HOSPITALIST

## 2019-08-23 PROCEDURE — 96366 THER/PROPH/DIAG IV INF ADDON: CPT

## 2019-08-23 PROCEDURE — 83605 ASSAY OF LACTIC ACID: CPT | Performed by: HOSPITALIST

## 2019-08-23 PROCEDURE — 84484 ASSAY OF TROPONIN QUANT: CPT | Performed by: HOSPITALIST

## 2019-08-23 PROCEDURE — 36600 WITHDRAWAL OF ARTERIAL BLOOD: CPT

## 2019-08-23 PROCEDURE — 85025 COMPLETE CBC W/AUTO DIFF WBC: CPT | Performed by: HOSPITALIST

## 2019-08-23 PROCEDURE — 80178 ASSAY OF LITHIUM: CPT | Performed by: NURSE PRACTITIONER

## 2019-08-23 PROCEDURE — 87040 BLOOD CULTURE FOR BACTERIA: CPT | Performed by: NURSE PRACTITIONER

## 2019-08-23 PROCEDURE — 70486 CT MAXILLOFACIAL W/O DYE: CPT

## 2019-08-23 PROCEDURE — 99285 EMERGENCY DEPT VISIT HI MDM: CPT

## 2019-08-23 PROCEDURE — 83605 ASSAY OF LACTIC ACID: CPT

## 2019-08-23 PROCEDURE — 93306 TTE W/DOPPLER COMPLETE: CPT

## 2019-08-23 PROCEDURE — 85025 COMPLETE CBC W/AUTO DIFF WBC: CPT | Performed by: NURSE PRACTITIONER

## 2019-08-23 PROCEDURE — 81025 URINE PREGNANCY TEST: CPT | Performed by: NURSE PRACTITIONER

## 2019-08-23 PROCEDURE — 96372 THER/PROPH/DIAG INJ SC/IM: CPT

## 2019-08-23 PROCEDURE — 93005 ELECTROCARDIOGRAM TRACING: CPT

## 2019-08-23 PROCEDURE — 96374 THER/PROPH/DIAG INJ IV PUSH: CPT

## 2019-08-23 PROCEDURE — 82570 ASSAY OF URINE CREATININE: CPT | Performed by: NURSE PRACTITIONER

## 2019-08-23 PROCEDURE — 80053 COMPREHEN METABOLIC PANEL: CPT | Performed by: NURSE PRACTITIONER

## 2019-08-23 PROCEDURE — 81001 URINALYSIS AUTO W/SCOPE: CPT | Performed by: NURSE PRACTITIONER

## 2019-08-23 PROCEDURE — 93005 ELECTROCARDIOGRAM TRACING: CPT | Performed by: NURSE PRACTITIONER

## 2019-08-23 PROCEDURE — 83874 ASSAY OF MYOGLOBIN: CPT | Performed by: NURSE PRACTITIONER

## 2019-08-23 PROCEDURE — 81003 URINALYSIS AUTO W/O SCOPE: CPT | Performed by: INTERNAL MEDICINE

## 2019-08-23 PROCEDURE — 87086 URINE CULTURE/COLONY COUNT: CPT | Performed by: NURSE PRACTITIONER

## 2019-08-23 PROCEDURE — 99225 PR SBSQ OBSERVATION CARE/DAY 25 MINUTES: CPT | Performed by: HOSPITALIST

## 2019-08-23 PROCEDURE — 25010000002 CEFTRIAXONE IN SWFI 1 GRAM/10ML IV PUSH SYRINGE (SIMPLE): Performed by: NURSE PRACTITIONER

## 2019-08-23 PROCEDURE — 71045 X-RAY EXAM CHEST 1 VIEW: CPT

## 2019-08-23 PROCEDURE — 82570 ASSAY OF URINE CREATININE: CPT | Performed by: HOSPITALIST

## 2019-08-23 PROCEDURE — 25010000002 ENOXAPARIN PER 10 MG: Performed by: INTERNAL MEDICINE

## 2019-08-23 PROCEDURE — 82550 ASSAY OF CK (CPK): CPT | Performed by: NURSE PRACTITIONER

## 2019-08-23 PROCEDURE — 82803 BLOOD GASES ANY COMBINATION: CPT

## 2019-08-23 PROCEDURE — 87081 CULTURE SCREEN ONLY: CPT | Performed by: INTERNAL MEDICINE

## 2019-08-23 PROCEDURE — 93005 ELECTROCARDIOGRAM TRACING: CPT | Performed by: HOSPITALIST

## 2019-08-23 PROCEDURE — 96365 THER/PROPH/DIAG IV INF INIT: CPT

## 2019-08-23 RX ORDER — BUPRENORPHINE HYDROCHLORIDE AND NALOXONE HYDROCHLORIDE DIHYDRATE 8; 2 MG/1; MG/1
1 TABLET SUBLINGUAL DAILY
Status: DISCONTINUED | OUTPATIENT
Start: 2019-08-23 | End: 2019-08-25

## 2019-08-23 RX ORDER — IPRATROPIUM BROMIDE AND ALBUTEROL SULFATE 2.5; .5 MG/3ML; MG/3ML
3 SOLUTION RESPIRATORY (INHALATION)
Status: DISCONTINUED | OUTPATIENT
Start: 2019-08-23 | End: 2019-08-23

## 2019-08-23 RX ORDER — OLANZAPINE 10 MG/1
40 TABLET ORAL NIGHTLY
Status: DISCONTINUED | OUTPATIENT
Start: 2019-08-23 | End: 2019-08-24

## 2019-08-23 RX ORDER — DOCUSATE SODIUM 100 MG/1
100 CAPSULE, LIQUID FILLED ORAL 2 TIMES DAILY
Status: DISCONTINUED | OUTPATIENT
Start: 2019-08-23 | End: 2019-08-27 | Stop reason: HOSPADM

## 2019-08-23 RX ORDER — POLYETHYLENE GLYCOL 3350 17 G/17G
17 POWDER, FOR SOLUTION ORAL DAILY PRN
COMMUNITY
End: 2021-02-04

## 2019-08-23 RX ORDER — DOCUSATE SODIUM 100 MG/1
100 CAPSULE, LIQUID FILLED ORAL 2 TIMES DAILY PRN
Status: DISCONTINUED | OUTPATIENT
Start: 2019-08-23 | End: 2019-08-27 | Stop reason: HOSPADM

## 2019-08-23 RX ORDER — POTASSIUM CHLORIDE 1.5 G/1.77G
40 POWDER, FOR SOLUTION ORAL AS NEEDED
Status: DISCONTINUED | OUTPATIENT
Start: 2019-08-23 | End: 2019-08-27 | Stop reason: HOSPADM

## 2019-08-23 RX ORDER — FLUTICASONE PROPIONATE 50 MCG
2 SPRAY, SUSPENSION (ML) NASAL DAILY
Status: DISCONTINUED | OUTPATIENT
Start: 2019-08-24 | End: 2019-08-27 | Stop reason: HOSPADM

## 2019-08-23 RX ORDER — ALBUTEROL SULFATE 90 UG/1
2 AEROSOL, METERED RESPIRATORY (INHALATION) EVERY 6 HOURS PRN
COMMUNITY
End: 2020-03-02

## 2019-08-23 RX ORDER — POTASSIUM CHLORIDE 20 MEQ/1
40 TABLET, EXTENDED RELEASE ORAL ONCE
Status: COMPLETED | OUTPATIENT
Start: 2019-08-23 | End: 2019-08-23

## 2019-08-23 RX ORDER — LITHIUM CARBONATE 300 MG/1
300 CAPSULE ORAL DAILY
COMMUNITY
End: 2020-03-02

## 2019-08-23 RX ORDER — SODIUM CHLORIDE 0.9 % (FLUSH) 0.9 %
3 SYRINGE (ML) INJECTION EVERY 12 HOURS SCHEDULED
Status: DISCONTINUED | OUTPATIENT
Start: 2019-08-23 | End: 2019-08-27 | Stop reason: HOSPADM

## 2019-08-23 RX ORDER — POTASSIUM CHLORIDE 20 MEQ/1
40 TABLET, EXTENDED RELEASE ORAL AS NEEDED
Status: DISCONTINUED | OUTPATIENT
Start: 2019-08-23 | End: 2019-08-27 | Stop reason: HOSPADM

## 2019-08-23 RX ORDER — DOCUSATE SODIUM 100 MG/1
100 CAPSULE, LIQUID FILLED ORAL 2 TIMES DAILY
COMMUNITY

## 2019-08-23 RX ORDER — SODIUM CHLORIDE 0.9 % (FLUSH) 0.9 %
3-10 SYRINGE (ML) INJECTION AS NEEDED
Status: DISCONTINUED | OUTPATIENT
Start: 2019-08-23 | End: 2019-08-27 | Stop reason: HOSPADM

## 2019-08-23 RX ORDER — VENLAFAXINE HYDROCHLORIDE 75 MG/1
225 CAPSULE, EXTENDED RELEASE ORAL DAILY
Status: DISCONTINUED | OUTPATIENT
Start: 2019-08-23 | End: 2019-08-27 | Stop reason: HOSPADM

## 2019-08-23 RX ORDER — GABAPENTIN 100 MG/1
100 CAPSULE ORAL EVERY 8 HOURS SCHEDULED
Status: DISCONTINUED | OUTPATIENT
Start: 2019-08-23 | End: 2019-08-27 | Stop reason: HOSPADM

## 2019-08-23 RX ORDER — FAMOTIDINE 10 MG/ML
20 INJECTION, SOLUTION INTRAVENOUS DAILY
Status: DISCONTINUED | OUTPATIENT
Start: 2019-08-23 | End: 2019-08-25

## 2019-08-23 RX ORDER — ONDANSETRON 2 MG/ML
4 INJECTION INTRAMUSCULAR; INTRAVENOUS EVERY 6 HOURS PRN
Status: DISCONTINUED | OUTPATIENT
Start: 2019-08-23 | End: 2019-08-27 | Stop reason: HOSPADM

## 2019-08-23 RX ORDER — SODIUM CHLORIDE 0.9 % (FLUSH) 0.9 %
10 SYRINGE (ML) INJECTION AS NEEDED
Status: DISCONTINUED | OUTPATIENT
Start: 2019-08-23 | End: 2019-08-27 | Stop reason: HOSPADM

## 2019-08-23 RX ORDER — CETIRIZINE HYDROCHLORIDE 10 MG/1
10 TABLET ORAL DAILY
COMMUNITY
End: 2019-08-27 | Stop reason: HOSPADM

## 2019-08-23 RX ORDER — RAMELTEON 8 MG/1
8 TABLET ORAL NIGHTLY
COMMUNITY
End: 2019-08-27 | Stop reason: HOSPADM

## 2019-08-23 RX ORDER — LITHIUM CARBONATE 300 MG/1
300 CAPSULE ORAL DAILY
Status: DISCONTINUED | OUTPATIENT
Start: 2019-08-24 | End: 2019-08-27 | Stop reason: HOSPADM

## 2019-08-23 RX ORDER — IPRATROPIUM BROMIDE AND ALBUTEROL SULFATE 2.5; .5 MG/3ML; MG/3ML
3 SOLUTION RESPIRATORY (INHALATION)
Status: DISCONTINUED | OUTPATIENT
Start: 2019-08-23 | End: 2019-08-27 | Stop reason: HOSPADM

## 2019-08-23 RX ORDER — HEPARIN SODIUM 5000 [USP'U]/ML
5000 INJECTION, SOLUTION INTRAVENOUS; SUBCUTANEOUS EVERY 8 HOURS SCHEDULED
Status: DISCONTINUED | OUTPATIENT
Start: 2019-08-23 | End: 2019-08-23

## 2019-08-23 RX ORDER — FLUTICASONE PROPIONATE 50 MCG
2 SPRAY, SUSPENSION (ML) NASAL DAILY
COMMUNITY
End: 2021-02-04

## 2019-08-23 RX ORDER — LAMOTRIGINE 100 MG/1
200 TABLET ORAL DAILY
Status: DISCONTINUED | OUTPATIENT
Start: 2019-08-23 | End: 2019-08-27 | Stop reason: HOSPADM

## 2019-08-23 RX ORDER — ONDANSETRON 4 MG/1
4 TABLET, FILM COATED ORAL EVERY 6 HOURS PRN
Status: DISCONTINUED | OUTPATIENT
Start: 2019-08-23 | End: 2019-08-27 | Stop reason: HOSPADM

## 2019-08-23 RX ORDER — ALBUTEROL SULFATE 2.5 MG/3ML
2.5 SOLUTION RESPIRATORY (INHALATION) EVERY 6 HOURS PRN
Status: DISCONTINUED | OUTPATIENT
Start: 2019-08-23 | End: 2019-08-27 | Stop reason: HOSPADM

## 2019-08-23 RX ORDER — VENLAFAXINE HYDROCHLORIDE 150 MG/1
225 CAPSULE, EXTENDED RELEASE ORAL
COMMUNITY

## 2019-08-23 RX ADMIN — DEXTROSE MONOHYDRATE 75 MEQ: 5 INJECTION, SOLUTION INTRAVENOUS at 13:45

## 2019-08-23 RX ADMIN — POTASSIUM CHLORIDE 40 MEQ: 1500 TABLET, EXTENDED RELEASE ORAL at 18:01

## 2019-08-23 RX ADMIN — SODIUM CHLORIDE 1000 ML: 0.9 INJECTION, SOLUTION INTRAVENOUS at 09:47

## 2019-08-23 RX ADMIN — LAMOTRIGINE 200 MG: 100 TABLET ORAL at 18:01

## 2019-08-23 RX ADMIN — FAMOTIDINE 20 MG: 10 INJECTION, SOLUTION INTRAVENOUS at 18:02

## 2019-08-23 RX ADMIN — BUPRENORPHINE AND NALOXONE 1 TABLET: 8; 2 TABLET SUBLINGUAL at 18:17

## 2019-08-23 RX ADMIN — IPRATROPIUM BROMIDE AND ALBUTEROL SULFATE 3 ML: .5; 3 SOLUTION RESPIRATORY (INHALATION) at 20:10

## 2019-08-23 RX ADMIN — GABAPENTIN 100 MG: 100 CAPSULE ORAL at 21:26

## 2019-08-23 RX ADMIN — GABAPENTIN 100 MG: 100 CAPSULE ORAL at 18:05

## 2019-08-23 RX ADMIN — POTASSIUM CHLORIDE 40 MEQ: 1500 TABLET, EXTENDED RELEASE ORAL at 21:26

## 2019-08-23 RX ADMIN — CEFTRIAXONE SODIUM 1 G: 1 INJECTION, POWDER, FOR SOLUTION INTRAMUSCULAR; INTRAVENOUS at 10:23

## 2019-08-23 RX ADMIN — SODIUM BICARBONATE 50 MEQ: 84 INJECTION, SOLUTION INTRAVENOUS at 13:46

## 2019-08-23 RX ADMIN — Medication 3 ML: at 21:28

## 2019-08-23 RX ADMIN — POTASSIUM CHLORIDE 40 MEQ: 1500 TABLET, EXTENDED RELEASE ORAL at 10:23

## 2019-08-23 RX ADMIN — DOCUSATE SODIUM 100 MG: 100 CAPSULE, LIQUID FILLED ORAL at 21:26

## 2019-08-23 RX ADMIN — SODIUM CHLORIDE 1000 ML: 900 INJECTION, SOLUTION INTRAVENOUS at 13:45

## 2019-08-23 RX ADMIN — DEXTROSE MONOHYDRATE 75 MEQ: 5 INJECTION, SOLUTION INTRAVENOUS at 17:59

## 2019-08-23 RX ADMIN — VENLAFAXINE HYDROCHLORIDE 225 MG: 75 CAPSULE, EXTENDED RELEASE ORAL at 21:31

## 2019-08-23 RX ADMIN — OLANZAPINE 40 MG: 10 TABLET, FILM COATED ORAL at 21:26

## 2019-08-23 RX ADMIN — ENOXAPARIN SODIUM 40 MG: 40 INJECTION SUBCUTANEOUS at 18:03

## 2019-08-24 PROBLEM — F41.1 GENERALIZED ANXIETY DISORDER: Chronic | Status: ACTIVE | Noted: 2019-08-24

## 2019-08-24 PROBLEM — F31.30 BIPOLAR I DISORDER, MOST RECENT EPISODE DEPRESSED (HCC): Chronic | Status: ACTIVE | Noted: 2019-08-24

## 2019-08-24 LAB
ANION GAP SERPL CALCULATED.3IONS-SCNC: 10.2 MMOL/L (ref 5–15)
BACTERIA SPEC AEROBE CULT: NO GROWTH
BASOPHILS # BLD AUTO: 0.1 10*3/MM3 (ref 0–0.2)
BASOPHILS NFR BLD AUTO: 0.9 % (ref 0–1.5)
BUN BLD-MCNC: 11 MG/DL (ref 8–20)
BUN/CREAT SERPL: 9.2 (ref 5.4–26.2)
CALCIUM SPEC-SCNC: 8.9 MG/DL (ref 8.9–10.3)
CHLORIDE SERPL-SCNC: 119 MMOL/L (ref 101–111)
CK SERPL-CCNC: 252 U/L (ref 38–234)
CO2 SERPL-SCNC: 16 MMOL/L (ref 22–32)
CREAT BLD-MCNC: 1.2 MG/DL (ref 0.4–1)
DEPRECATED RDW RBC AUTO: 49.9 FL (ref 37–54)
EOSINOPHIL # BLD AUTO: 0.1 10*3/MM3 (ref 0–0.4)
EOSINOPHIL NFR BLD AUTO: 1.7 % (ref 0.3–6.2)
ERYTHROCYTE [DISTWIDTH] IN BLOOD BY AUTOMATED COUNT: 15.1 % (ref 12.3–15.4)
GFR SERPL CREATININE-BSD FRML MDRD: 49 ML/MIN/1.73
GLUCOSE BLD-MCNC: 96 MG/DL (ref 65–99)
HCT VFR BLD AUTO: 30.2 % (ref 34–46.6)
HGB BLD-MCNC: 9.8 G/DL (ref 12–15.9)
LYMPHOCYTES # BLD AUTO: 3.5 10*3/MM3 (ref 0.7–3.1)
LYMPHOCYTES NFR BLD AUTO: 46.5 % (ref 19.6–45.3)
MCH RBC QN AUTO: 29.9 PG (ref 26.6–33)
MCHC RBC AUTO-ENTMCNC: 32.4 G/DL (ref 31.5–35.7)
MCV RBC AUTO: 92.4 FL (ref 79–97)
MONOCYTES # BLD AUTO: 0.4 10*3/MM3 (ref 0.1–0.9)
MONOCYTES NFR BLD AUTO: 5.9 % (ref 5–12)
NEUTROPHILS # BLD AUTO: 3.4 10*3/MM3 (ref 1.7–7)
NEUTROPHILS NFR BLD AUTO: 45 % (ref 42.7–76)
NRBC BLD AUTO-RTO: 0 /100 WBC (ref 0–0.2)
PLATELET # BLD AUTO: 318 10*3/MM3 (ref 140–450)
PMV BLD AUTO: 8.3 FL (ref 6–12)
POTASSIUM BLD-SCNC: 3.2 MMOL/L (ref 3.6–5.1)
RBC # BLD AUTO: 3.27 10*6/MM3 (ref 3.77–5.28)
SODIUM BLD-SCNC: 142 MMOL/L (ref 136–144)
TROPONIN I SERPL-MCNC: <0.03 NG/ML (ref 0–0.03)
URINE MYOGLOBIN, QUALITATIVE: NEGATIVE
WBC NRBC COR # BLD: 7.5 10*3/MM3 (ref 3.4–10.8)

## 2019-08-24 PROCEDURE — G0378 HOSPITAL OBSERVATION PER HR: HCPCS

## 2019-08-24 PROCEDURE — 25010000002 METHYLNALTREXONE 12 MG/0.6ML SOLUTION: Performed by: NURSE PRACTITIONER

## 2019-08-24 PROCEDURE — 80048 BASIC METABOLIC PNL TOTAL CA: CPT | Performed by: HOSPITALIST

## 2019-08-24 PROCEDURE — 85025 COMPLETE CBC W/AUTO DIFF WBC: CPT | Performed by: HOSPITALIST

## 2019-08-24 PROCEDURE — 84484 ASSAY OF TROPONIN QUANT: CPT | Performed by: HOSPITALIST

## 2019-08-24 PROCEDURE — 94799 UNLISTED PULMONARY SVC/PX: CPT

## 2019-08-24 PROCEDURE — 96366 THER/PROPH/DIAG IV INF ADDON: CPT

## 2019-08-24 PROCEDURE — 96372 THER/PROPH/DIAG INJ SC/IM: CPT

## 2019-08-24 PROCEDURE — 25010000002 ENOXAPARIN PER 10 MG: Performed by: INTERNAL MEDICINE

## 2019-08-24 PROCEDURE — 25010000002 CEFTRIAXONE PER 250 MG: Performed by: HOSPITALIST

## 2019-08-24 PROCEDURE — 82550 ASSAY OF CK (CPK): CPT | Performed by: NURSE PRACTITIONER

## 2019-08-24 PROCEDURE — 99204 OFFICE O/P NEW MOD 45 MIN: CPT | Performed by: PSYCHIATRY & NEUROLOGY

## 2019-08-24 PROCEDURE — 96376 TX/PRO/DX INJ SAME DRUG ADON: CPT

## 2019-08-24 RX ORDER — OLANZAPINE 10 MG/1
20 TABLET ORAL NIGHTLY
Status: DISCONTINUED | OUTPATIENT
Start: 2019-08-24 | End: 2019-08-27 | Stop reason: HOSPADM

## 2019-08-24 RX ORDER — POLYETHYLENE GLYCOL 3350 17 G/17G
17 POWDER, FOR SOLUTION ORAL DAILY
Status: DISCONTINUED | OUTPATIENT
Start: 2019-08-24 | End: 2019-08-27 | Stop reason: HOSPADM

## 2019-08-24 RX ADMIN — DOCUSATE SODIUM 100 MG: 100 CAPSULE, LIQUID FILLED ORAL at 20:01

## 2019-08-24 RX ADMIN — VENLAFAXINE HYDROCHLORIDE 225 MG: 75 CAPSULE, EXTENDED RELEASE ORAL at 20:01

## 2019-08-24 RX ADMIN — DEXTROSE MONOHYDRATE 75 MEQ: 5 INJECTION, SOLUTION INTRAVENOUS at 03:28

## 2019-08-24 RX ADMIN — GABAPENTIN 100 MG: 100 CAPSULE ORAL at 21:19

## 2019-08-24 RX ADMIN — Medication 3 ML: at 20:01

## 2019-08-24 RX ADMIN — LITHIUM CARBONATE 300 MG: 300 CAPSULE, GELATIN COATED ORAL at 08:35

## 2019-08-24 RX ADMIN — POLYETHYLENE GLYCOL 3350 17 G: 17 POWDER, FOR SOLUTION ORAL at 17:34

## 2019-08-24 RX ADMIN — FLUTICASONE PROPIONATE 2 SPRAY: 50 SPRAY, METERED NASAL at 08:36

## 2019-08-24 RX ADMIN — Medication 3 ML: at 08:35

## 2019-08-24 RX ADMIN — POTASSIUM CHLORIDE 40 MEQ: 1500 TABLET, EXTENDED RELEASE ORAL at 05:01

## 2019-08-24 RX ADMIN — CEFTRIAXONE SODIUM 1 G: 1 INJECTION, POWDER, FOR SOLUTION INTRAMUSCULAR; INTRAVENOUS at 12:54

## 2019-08-24 RX ADMIN — OLANZAPINE 20 MG: 10 TABLET, FILM COATED ORAL at 20:01

## 2019-08-24 RX ADMIN — OYSTER SHELL CALCIUM WITH VITAMIN D 1 TABLET: 500; 200 TABLET, FILM COATED ORAL at 08:34

## 2019-08-24 RX ADMIN — BUPRENORPHINE AND NALOXONE 1 TABLET: 8; 2 TABLET SUBLINGUAL at 17:52

## 2019-08-24 RX ADMIN — FAMOTIDINE 20 MG: 10 INJECTION, SOLUTION INTRAVENOUS at 08:34

## 2019-08-24 RX ADMIN — POTASSIUM CHLORIDE 40 MEQ: 1500 TABLET, EXTENDED RELEASE ORAL at 07:01

## 2019-08-24 RX ADMIN — LAMOTRIGINE 200 MG: 100 TABLET ORAL at 08:34

## 2019-08-24 RX ADMIN — ENOXAPARIN SODIUM 40 MG: 40 INJECTION SUBCUTANEOUS at 16:23

## 2019-08-24 RX ADMIN — METHYLNALTREXONE BROMIDE 8 MG: 12 INJECTION, SOLUTION SUBCUTANEOUS at 17:34

## 2019-08-24 RX ADMIN — IPRATROPIUM BROMIDE AND ALBUTEROL SULFATE 3 ML: .5; 3 SOLUTION RESPIRATORY (INHALATION) at 18:49

## 2019-08-24 RX ADMIN — IPRATROPIUM BROMIDE AND ALBUTEROL SULFATE 3 ML: .5; 3 SOLUTION RESPIRATORY (INHALATION) at 11:00

## 2019-08-24 RX ADMIN — DOCUSATE SODIUM 100 MG: 100 CAPSULE, LIQUID FILLED ORAL at 08:35

## 2019-08-24 RX ADMIN — GABAPENTIN 100 MG: 100 CAPSULE ORAL at 14:12

## 2019-08-24 RX ADMIN — GABAPENTIN 100 MG: 100 CAPSULE ORAL at 05:01

## 2019-08-25 ENCOUNTER — APPOINTMENT (OUTPATIENT)
Dept: CT IMAGING | Facility: HOSPITAL | Age: 44
End: 2019-08-25

## 2019-08-25 LAB
ANION GAP SERPL CALCULATED.3IONS-SCNC: 7.9 MMOL/L (ref 5–15)
BASOPHILS # BLD AUTO: 0 10*3/MM3 (ref 0–0.2)
BASOPHILS NFR BLD AUTO: 0.3 % (ref 0–1.5)
BUN BLD-MCNC: 8 MG/DL (ref 8–20)
BUN/CREAT SERPL: 7.3 (ref 5.4–26.2)
CALCIUM SPEC-SCNC: 8.4 MG/DL (ref 8.9–10.3)
CHLORIDE SERPL-SCNC: 116 MMOL/L (ref 101–111)
CHLORIDE UR-SCNC: 99 MMOL/L
CO2 SERPL-SCNC: 23 MMOL/L (ref 22–32)
CREAT BLD-MCNC: 1.1 MG/DL (ref 0.4–1)
D DIMER PPP FEU-MCNC: 1.17 MCGFEU/ML (ref 0.17–0.59)
DEPRECATED RDW RBC AUTO: 48.1 FL (ref 37–54)
EOSINOPHIL # BLD AUTO: 0.1 10*3/MM3 (ref 0–0.4)
EOSINOPHIL NFR BLD AUTO: 1.6 % (ref 0.3–6.2)
ERYTHROCYTE [DISTWIDTH] IN BLOOD BY AUTOMATED COUNT: 14.7 % (ref 12.3–15.4)
GFR SERPL CREATININE-BSD FRML MDRD: 54 ML/MIN/1.73
GLUCOSE BLD-MCNC: 87 MG/DL (ref 65–99)
HCT VFR BLD AUTO: 27.9 % (ref 34–46.6)
HGB BLD-MCNC: 9.2 G/DL (ref 12–15.9)
LYMPHOCYTES # BLD AUTO: 3.4 10*3/MM3 (ref 0.7–3.1)
LYMPHOCYTES NFR BLD AUTO: 45.6 % (ref 19.6–45.3)
MAGNESIUM SERPL-MCNC: 1.8 MG/DL (ref 1.8–2.5)
MCH RBC QN AUTO: 30.2 PG (ref 26.6–33)
MCHC RBC AUTO-ENTMCNC: 32.9 G/DL (ref 31.5–35.7)
MCV RBC AUTO: 91.8 FL (ref 79–97)
MONOCYTES # BLD AUTO: 0.5 10*3/MM3 (ref 0.1–0.9)
MONOCYTES NFR BLD AUTO: 6.6 % (ref 5–12)
MRSA SPEC QL CULT: NORMAL
NEUTROPHILS # BLD AUTO: 3.4 10*3/MM3 (ref 1.7–7)
NEUTROPHILS NFR BLD AUTO: 45.9 % (ref 42.7–76)
NRBC BLD AUTO-RTO: 0.2 /100 WBC (ref 0–0.2)
PLATELET # BLD AUTO: 271 10*3/MM3 (ref 140–450)
PMV BLD AUTO: 8.8 FL (ref 6–12)
POTASSIUM BLD-SCNC: 2.9 MMOL/L (ref 3.6–5.1)
POTASSIUM UR-SCNC: 12.2 MMOL/L
RBC # BLD AUTO: 3.04 10*6/MM3 (ref 3.77–5.28)
SODIUM BLD-SCNC: 144 MMOL/L (ref 136–144)
SODIUM UR-SCNC: 107 MMOL/L
WBC NRBC COR # BLD: 7.5 10*3/MM3 (ref 3.4–10.8)

## 2019-08-25 PROCEDURE — G0378 HOSPITAL OBSERVATION PER HR: HCPCS

## 2019-08-25 PROCEDURE — 85379 FIBRIN DEGRADATION QUANT: CPT | Performed by: HOSPITALIST

## 2019-08-25 PROCEDURE — 96376 TX/PRO/DX INJ SAME DRUG ADON: CPT

## 2019-08-25 PROCEDURE — 25010000002 KETOROLAC TROMETHAMINE PER 15 MG: Performed by: PHYSICIAN ASSISTANT

## 2019-08-25 PROCEDURE — 84300 ASSAY OF URINE SODIUM: CPT | Performed by: HOSPITALIST

## 2019-08-25 PROCEDURE — 80048 BASIC METABOLIC PNL TOTAL CA: CPT | Performed by: HOSPITALIST

## 2019-08-25 PROCEDURE — 0 IOPAMIDOL PER 1 ML: Performed by: INTERNAL MEDICINE

## 2019-08-25 PROCEDURE — 99225 PR SBSQ OBSERVATION CARE/DAY 25 MINUTES: CPT | Performed by: PSYCHIATRY & NEUROLOGY

## 2019-08-25 PROCEDURE — 84133 ASSAY OF URINE POTASSIUM: CPT | Performed by: HOSPITALIST

## 2019-08-25 PROCEDURE — 94799 UNLISTED PULMONARY SVC/PX: CPT

## 2019-08-25 PROCEDURE — 96367 TX/PROPH/DG ADDL SEQ IV INF: CPT

## 2019-08-25 PROCEDURE — 85025 COMPLETE CBC W/AUTO DIFF WBC: CPT | Performed by: HOSPITALIST

## 2019-08-25 PROCEDURE — 99225 PR SBSQ OBSERVATION CARE/DAY 25 MINUTES: CPT | Performed by: HOSPITALIST

## 2019-08-25 PROCEDURE — 83735 ASSAY OF MAGNESIUM: CPT | Performed by: INTERNAL MEDICINE

## 2019-08-25 PROCEDURE — 82436 ASSAY OF URINE CHLORIDE: CPT | Performed by: HOSPITALIST

## 2019-08-25 PROCEDURE — 25010000002 CEFTRIAXONE PER 250 MG: Performed by: HOSPITALIST

## 2019-08-25 PROCEDURE — 71260 CT THORAX DX C+: CPT

## 2019-08-25 RX ORDER — KETOROLAC TROMETHAMINE 15 MG/ML
15 INJECTION, SOLUTION INTRAMUSCULAR; INTRAVENOUS ONCE
Status: COMPLETED | OUTPATIENT
Start: 2019-08-25 | End: 2019-08-25

## 2019-08-25 RX ORDER — SODIUM CHLORIDE, SODIUM LACTATE, POTASSIUM CHLORIDE, CALCIUM CHLORIDE 600; 310; 30; 20 MG/100ML; MG/100ML; MG/100ML; MG/100ML
500 INJECTION, SOLUTION INTRAVENOUS ONCE
Status: COMPLETED | OUTPATIENT
Start: 2019-08-25 | End: 2019-08-25

## 2019-08-25 RX ORDER — BUPRENORPHINE HYDROCHLORIDE AND NALOXONE HYDROCHLORIDE DIHYDRATE 8; 2 MG/1; MG/1
2 TABLET SUBLINGUAL DAILY
Status: DISCONTINUED | OUTPATIENT
Start: 2019-08-25 | End: 2019-08-27 | Stop reason: HOSPADM

## 2019-08-25 RX ADMIN — POTASSIUM CHLORIDE 40 MEQ: 1500 TABLET, EXTENDED RELEASE ORAL at 06:47

## 2019-08-25 RX ADMIN — IPRATROPIUM BROMIDE AND ALBUTEROL SULFATE 3 ML: .5; 3 SOLUTION RESPIRATORY (INHALATION) at 18:40

## 2019-08-25 RX ADMIN — OYSTER SHELL CALCIUM WITH VITAMIN D 1 TABLET: 500; 200 TABLET, FILM COATED ORAL at 08:42

## 2019-08-25 RX ADMIN — FLUTICASONE PROPIONATE 2 SPRAY: 50 SPRAY, METERED NASAL at 08:41

## 2019-08-25 RX ADMIN — VENLAFAXINE HYDROCHLORIDE 225 MG: 75 CAPSULE, EXTENDED RELEASE ORAL at 21:05

## 2019-08-25 RX ADMIN — OLANZAPINE 20 MG: 10 TABLET, FILM COATED ORAL at 21:06

## 2019-08-25 RX ADMIN — LITHIUM CARBONATE 300 MG: 300 CAPSULE, GELATIN COATED ORAL at 08:41

## 2019-08-25 RX ADMIN — GABAPENTIN 100 MG: 100 CAPSULE ORAL at 21:05

## 2019-08-25 RX ADMIN — POLYETHYLENE GLYCOL 3350 17 G: 17 POWDER, FOR SOLUTION ORAL at 08:40

## 2019-08-25 RX ADMIN — DEXTROSE MONOHYDRATE 75 MEQ: 5 INJECTION, SOLUTION INTRAVENOUS at 06:47

## 2019-08-25 RX ADMIN — IOPAMIDOL 100 ML: 755 INJECTION, SOLUTION INTRAVENOUS at 18:00

## 2019-08-25 RX ADMIN — GABAPENTIN 100 MG: 100 CAPSULE ORAL at 06:47

## 2019-08-25 RX ADMIN — LAMOTRIGINE 200 MG: 100 TABLET ORAL at 08:41

## 2019-08-25 RX ADMIN — GABAPENTIN 100 MG: 100 CAPSULE ORAL at 14:48

## 2019-08-25 RX ADMIN — Medication 3 ML: at 08:41

## 2019-08-25 RX ADMIN — BUPRENORPHINE AND NALOXONE 2 TABLET: 8; 2 TABLET SUBLINGUAL at 17:26

## 2019-08-25 RX ADMIN — IPRATROPIUM BROMIDE AND ALBUTEROL SULFATE 3 ML: .5; 3 SOLUTION RESPIRATORY (INHALATION) at 06:39

## 2019-08-25 RX ADMIN — FAMOTIDINE 20 MG: 10 INJECTION, SOLUTION INTRAVENOUS at 08:40

## 2019-08-25 RX ADMIN — POTASSIUM CHLORIDE 40 MEQ: 1500 TABLET, EXTENDED RELEASE ORAL at 11:54

## 2019-08-25 RX ADMIN — DOCUSATE SODIUM 100 MG: 100 CAPSULE, LIQUID FILLED ORAL at 08:41

## 2019-08-25 RX ADMIN — DOCUSATE SODIUM 100 MG: 100 CAPSULE, LIQUID FILLED ORAL at 21:05

## 2019-08-25 RX ADMIN — CEFTRIAXONE SODIUM 1 G: 1 INJECTION, POWDER, FOR SOLUTION INTRAMUSCULAR; INTRAVENOUS at 11:53

## 2019-08-25 RX ADMIN — Medication 10 ML: at 21:06

## 2019-08-25 RX ADMIN — KETOROLAC TROMETHAMINE 15 MG: 15 INJECTION, SOLUTION INTRAMUSCULAR; INTRAVENOUS at 22:03

## 2019-08-25 RX ADMIN — SODIUM CHLORIDE, SODIUM LACTATE, POTASSIUM CHLORIDE, AND CALCIUM CHLORIDE 500 ML: 600; 310; 30; 20 INJECTION, SOLUTION INTRAVENOUS at 13:44

## 2019-08-25 RX ADMIN — IPRATROPIUM BROMIDE AND ALBUTEROL SULFATE 3 ML: .5; 3 SOLUTION RESPIRATORY (INHALATION) at 11:24

## 2019-08-26 ENCOUNTER — HOSPITAL ENCOUNTER (OUTPATIENT)
Dept: CARDIOLOGY | Facility: HOSPITAL | Age: 44
Setting detail: OBSERVATION
Discharge: HOME | End: 2019-08-26

## 2019-08-26 ENCOUNTER — APPOINTMENT (OUTPATIENT)
Dept: RESPIRATORY THERAPY | Facility: HOSPITAL | Age: 44
End: 2019-08-26

## 2019-08-26 PROBLEM — S02.2XXA NASAL FRACTURE: Status: ACTIVE | Noted: 2019-08-26

## 2019-08-26 PROBLEM — E87.20 METABOLIC ACIDOSIS: Status: ACTIVE | Noted: 2019-08-26

## 2019-08-26 PROBLEM — E53.8 VITAMIN B12 DEFICIENCY: Status: ACTIVE | Noted: 2019-08-26

## 2019-08-26 LAB
ANION GAP SERPL CALCULATED.3IONS-SCNC: 10.6 MMOL/L (ref 5–15)
BASOPHILS # BLD AUTO: 0 10*3/MM3 (ref 0–0.2)
BASOPHILS NFR BLD AUTO: 0.4 % (ref 0–1.5)
BH CV LOWER VASCULAR LEFT COMMON FEMORAL AUGMENT: NORMAL
BH CV LOWER VASCULAR LEFT COMMON FEMORAL COMPETENT: NORMAL
BH CV LOWER VASCULAR LEFT COMMON FEMORAL COMPRESS: NORMAL
BH CV LOWER VASCULAR LEFT COMMON FEMORAL PHASIC: NORMAL
BH CV LOWER VASCULAR LEFT COMMON FEMORAL SPONT: NORMAL
BH CV LOWER VASCULAR LEFT DISTAL FEMORAL COMPRESS: NORMAL
BH CV LOWER VASCULAR LEFT GASTRONEMIUS COMPRESS: NORMAL
BH CV LOWER VASCULAR LEFT GREATER SAPH AK COMPRESS: NORMAL
BH CV LOWER VASCULAR LEFT GREATER SAPH BK COMPRESS: NORMAL
BH CV LOWER VASCULAR LEFT LESSER SAPH COMPRESS: NORMAL
BH CV LOWER VASCULAR LEFT MID FEMORAL AUGMENT: NORMAL
BH CV LOWER VASCULAR LEFT MID FEMORAL COMPETENT: NORMAL
BH CV LOWER VASCULAR LEFT MID FEMORAL COMPRESS: NORMAL
BH CV LOWER VASCULAR LEFT MID FEMORAL PHASIC: NORMAL
BH CV LOWER VASCULAR LEFT MID FEMORAL SPONT: NORMAL
BH CV LOWER VASCULAR LEFT PERONEAL COMPRESS: NORMAL
BH CV LOWER VASCULAR LEFT POPLITEAL AUGMENT: NORMAL
BH CV LOWER VASCULAR LEFT POPLITEAL COMPETENT: NORMAL
BH CV LOWER VASCULAR LEFT POPLITEAL COMPRESS: NORMAL
BH CV LOWER VASCULAR LEFT POPLITEAL PHASIC: NORMAL
BH CV LOWER VASCULAR LEFT POPLITEAL SPONT: NORMAL
BH CV LOWER VASCULAR LEFT POSTERIOR TIBIAL COMPRESS: NORMAL
BH CV LOWER VASCULAR LEFT PROXIMAL FEMORAL COMPRESS: NORMAL
BH CV LOWER VASCULAR LEFT SAPHENOFEMORAL JUNCTION COMPRESS: NORMAL
BH CV LOWER VASCULAR RIGHT COMMON FEMORAL AUGMENT: NORMAL
BH CV LOWER VASCULAR RIGHT COMMON FEMORAL COMPETENT: NORMAL
BH CV LOWER VASCULAR RIGHT COMMON FEMORAL COMPRESS: NORMAL
BH CV LOWER VASCULAR RIGHT COMMON FEMORAL PHASIC: NORMAL
BH CV LOWER VASCULAR RIGHT COMMON FEMORAL SPONT: NORMAL
BH CV XLRA MEAS LEFT DIST CCA EDV: -37.8 CM/SEC
BH CV XLRA MEAS LEFT DIST CCA PSV: -100 CM/SEC
BH CV XLRA MEAS LEFT DIST ICA EDV: -28.1 CM/SEC
BH CV XLRA MEAS LEFT DIST ICA PSV: -79.4 CM/SEC
BH CV XLRA MEAS LEFT PROX CCA EDV: 39 CM/SEC
BH CV XLRA MEAS LEFT PROX CCA PSV: 160 CM/SEC
BH CV XLRA MEAS LEFT PROX ECA EDV: -27.1 CM/SEC
BH CV XLRA MEAS LEFT PROX ECA PSV: -103 CM/SEC
BH CV XLRA MEAS LEFT PROX ICA EDV: -32.9 CM/SEC
BH CV XLRA MEAS LEFT PROX ICA PSV: -117 CM/SEC
BH CV XLRA MEAS LEFT PROX SCLA EDV: 20.8 CM/SEC
BH CV XLRA MEAS LEFT PROX SCLA PSV: 220 CM/SEC
BH CV XLRA MEAS LEFT VERTEBRAL A EDV: -32.9 CM/SEC
BH CV XLRA MEAS LEFT VERTEBRAL A PSV: -89.1 CM/SEC
BH CV XLRA MEAS RIGHT DIST CCA EDV: -37.3 CM/SEC
BH CV XLRA MEAS RIGHT DIST CCA PSV: -105 CM/SEC
BH CV XLRA MEAS RIGHT DIST ICA EDV: -36.8 CM/SEC
BH CV XLRA MEAS RIGHT DIST ICA PSV: -97.2 CM/SEC
BH CV XLRA MEAS RIGHT PROX CCA EDV: 30.3 CM/SEC
BH CV XLRA MEAS RIGHT PROX CCA PSV: 146 CM/SEC
BH CV XLRA MEAS RIGHT PROX ECA EDV: -20.5 CM/SEC
BH CV XLRA MEAS RIGHT PROX ECA PSV: -109 CM/SEC
BH CV XLRA MEAS RIGHT PROX ICA EDV: -20.5 CM/SEC
BH CV XLRA MEAS RIGHT PROX ICA PSV: -100 CM/SEC
BH CV XLRA MEAS RIGHT PROX SCLA EDV: -13.4 CM/SEC
BH CV XLRA MEAS RIGHT PROX SCLA PSV: -162 CM/SEC
BH CV XLRA MEAS RIGHT VERTEBRAL A PSV: -106 CM/SEC
BUN BLD-MCNC: 8 MG/DL (ref 8–20)
BUN/CREAT SERPL: 8.9 (ref 5.4–26.2)
CALCIUM SPEC-SCNC: 8.4 MG/DL (ref 8.9–10.3)
CHLORIDE SERPL-SCNC: 111 MMOL/L (ref 101–111)
CO2 SERPL-SCNC: 22 MMOL/L (ref 22–32)
CREAT BLD-MCNC: 0.9 MG/DL (ref 0.4–1)
DEPRECATED RDW RBC AUTO: 50.8 FL (ref 37–54)
EOSINOPHIL # BLD AUTO: 0.2 10*3/MM3 (ref 0–0.4)
EOSINOPHIL NFR BLD AUTO: 1.8 % (ref 0.3–6.2)
ERYTHROCYTE [DISTWIDTH] IN BLOOD BY AUTOMATED COUNT: 15.1 % (ref 12.3–15.4)
ERYTHROCYTE [SEDIMENTATION RATE] IN BLOOD: 17 MM/HR (ref 0–20)
GFR SERPL CREATININE-BSD FRML MDRD: 69 ML/MIN/1.73
GLUCOSE BLD-MCNC: 78 MG/DL (ref 65–99)
HCT VFR BLD AUTO: 28.8 % (ref 34–46.6)
HGB BLD-MCNC: 9.3 G/DL (ref 12–15.9)
LYMPHOCYTES # BLD AUTO: 4.4 10*3/MM3 (ref 0.7–3.1)
LYMPHOCYTES NFR BLD AUTO: 51 % (ref 19.6–45.3)
MCH RBC QN AUTO: 30.2 PG (ref 26.6–33)
MCHC RBC AUTO-ENTMCNC: 32.2 G/DL (ref 31.5–35.7)
MCV RBC AUTO: 93.7 FL (ref 79–97)
MONOCYTES # BLD AUTO: 0.6 10*3/MM3 (ref 0.1–0.9)
MONOCYTES NFR BLD AUTO: 7 % (ref 5–12)
NEUTROPHILS # BLD AUTO: 3.4 10*3/MM3 (ref 1.7–7)
NEUTROPHILS NFR BLD AUTO: 39.8 % (ref 42.7–76)
NRBC BLD AUTO-RTO: 0.1 /100 WBC (ref 0–0.2)
PLATELET # BLD AUTO: 248 10*3/MM3 (ref 140–450)
PMV BLD AUTO: 8.9 FL (ref 6–12)
POTASSIUM BLD-SCNC: 3.6 MMOL/L (ref 3.6–5.1)
RBC # BLD AUTO: 3.07 10*6/MM3 (ref 3.77–5.28)
SODIUM BLD-SCNC: 140 MMOL/L (ref 136–144)
T4 FREE SERPL-MCNC: 0.78 NG/DL (ref 0.58–1.64)
TSH SERPL DL<=0.05 MIU/L-ACNC: 7.06 MIU/ML (ref 0.34–5.6)
VIT B12 BLD-MCNC: 186 PG/ML (ref 180–914)
WBC NRBC COR # BLD: 8.6 10*3/MM3 (ref 3.4–10.8)

## 2019-08-26 PROCEDURE — 80048 BASIC METABOLIC PNL TOTAL CA: CPT | Performed by: HOSPITALIST

## 2019-08-26 PROCEDURE — 25010000002 CYANOCOBALAMIN PER 1000 MCG: Performed by: NURSE PRACTITIONER

## 2019-08-26 PROCEDURE — 96372 THER/PROPH/DIAG INJ SC/IM: CPT

## 2019-08-26 PROCEDURE — 94799 UNLISTED PULMONARY SVC/PX: CPT

## 2019-08-26 PROCEDURE — 84439 ASSAY OF FREE THYROXINE: CPT | Performed by: HOSPITALIST

## 2019-08-26 PROCEDURE — G0378 HOSPITAL OBSERVATION PER HR: HCPCS

## 2019-08-26 PROCEDURE — 93880 EXTRACRANIAL BILAT STUDY: CPT

## 2019-08-26 PROCEDURE — 93971 EXTREMITY STUDY: CPT

## 2019-08-26 PROCEDURE — 99225 PR SBSQ OBSERVATION CARE/DAY 25 MINUTES: CPT | Performed by: HOSPITALIST

## 2019-08-26 PROCEDURE — 84443 ASSAY THYROID STIM HORMONE: CPT | Performed by: HOSPITALIST

## 2019-08-26 PROCEDURE — 82607 VITAMIN B-12: CPT | Performed by: HOSPITALIST

## 2019-08-26 PROCEDURE — 93225 XTRNL ECG REC<48 HRS REC: CPT

## 2019-08-26 PROCEDURE — 25010000002 METHYLNALTREXONE 12 MG/0.6ML SOLUTION: Performed by: NURSE PRACTITIONER

## 2019-08-26 PROCEDURE — 99214 OFFICE O/P EST MOD 30 MIN: CPT | Performed by: NURSE PRACTITIONER

## 2019-08-26 PROCEDURE — 25010000002 CEFTRIAXONE PER 250 MG: Performed by: HOSPITALIST

## 2019-08-26 PROCEDURE — 85025 COMPLETE CBC W/AUTO DIFF WBC: CPT | Performed by: HOSPITALIST

## 2019-08-26 PROCEDURE — 99213 OFFICE O/P EST LOW 20 MIN: CPT | Performed by: PHYSICIAN ASSISTANT

## 2019-08-26 PROCEDURE — 85652 RBC SED RATE AUTOMATED: CPT | Performed by: HOSPITALIST

## 2019-08-26 PROCEDURE — 96366 THER/PROPH/DIAG IV INF ADDON: CPT

## 2019-08-26 RX ORDER — CYANOCOBALAMIN 1000 UG/ML
1000 INJECTION, SOLUTION INTRAMUSCULAR; SUBCUTANEOUS ONCE
Status: COMPLETED | OUTPATIENT
Start: 2019-08-26 | End: 2019-08-26

## 2019-08-26 RX ORDER — LIDOCAINE 50 MG/G
1 PATCH TOPICAL ONCE
Status: COMPLETED | OUTPATIENT
Start: 2019-08-26 | End: 2019-08-27

## 2019-08-26 RX ORDER — LANOLIN ALCOHOL/MO/W.PET/CERES
1000 CREAM (GRAM) TOPICAL DAILY
Status: DISCONTINUED | OUTPATIENT
Start: 2019-08-26 | End: 2019-08-27 | Stop reason: HOSPADM

## 2019-08-26 RX ORDER — CYANOCOBALAMIN 1000 UG/ML
1000 INJECTION, SOLUTION INTRAMUSCULAR; SUBCUTANEOUS DAILY
Status: DISCONTINUED | OUTPATIENT
Start: 2019-08-27 | End: 2019-08-27 | Stop reason: HOSPADM

## 2019-08-26 RX ORDER — ACETAMINOPHEN 325 MG/1
650 TABLET ORAL EVERY 4 HOURS PRN
Status: DISCONTINUED | OUTPATIENT
Start: 2019-08-26 | End: 2019-08-27 | Stop reason: HOSPADM

## 2019-08-26 RX ORDER — FOLIC ACID 1 MG/1
1 TABLET ORAL DAILY
Status: DISCONTINUED | OUTPATIENT
Start: 2019-08-26 | End: 2019-08-27 | Stop reason: HOSPADM

## 2019-08-26 RX ADMIN — IPRATROPIUM BROMIDE AND ALBUTEROL SULFATE 3 ML: .5; 3 SOLUTION RESPIRATORY (INHALATION) at 18:15

## 2019-08-26 RX ADMIN — VENLAFAXINE HYDROCHLORIDE 225 MG: 75 CAPSULE, EXTENDED RELEASE ORAL at 21:49

## 2019-08-26 RX ADMIN — GABAPENTIN 100 MG: 100 CAPSULE ORAL at 06:12

## 2019-08-26 RX ADMIN — METHYLNALTREXONE BROMIDE 8 MG: 12 INJECTION, SOLUTION SUBCUTANEOUS at 09:30

## 2019-08-26 RX ADMIN — Medication 3 ML: at 09:34

## 2019-08-26 RX ADMIN — CEFTRIAXONE SODIUM 1 G: 1 INJECTION, POWDER, FOR SOLUTION INTRAMUSCULAR; INTRAVENOUS at 11:12

## 2019-08-26 RX ADMIN — CYANOCOBALAMIN 1000 MCG: 1000 INJECTION, SOLUTION INTRAMUSCULAR; SUBCUTANEOUS at 11:12

## 2019-08-26 RX ADMIN — DOCUSATE SODIUM 100 MG: 100 CAPSULE, LIQUID FILLED ORAL at 21:41

## 2019-08-26 RX ADMIN — CYANOCOBALAMIN TAB 1000 MCG 1000 MCG: 1000 TAB at 11:12

## 2019-08-26 RX ADMIN — LAMOTRIGINE 200 MG: 100 TABLET ORAL at 09:32

## 2019-08-26 RX ADMIN — OYSTER SHELL CALCIUM WITH VITAMIN D 1 TABLET: 500; 200 TABLET, FILM COATED ORAL at 09:31

## 2019-08-26 RX ADMIN — LITHIUM CARBONATE 300 MG: 300 CAPSULE, GELATIN COATED ORAL at 09:31

## 2019-08-26 RX ADMIN — OLANZAPINE 20 MG: 10 TABLET, FILM COATED ORAL at 21:41

## 2019-08-26 RX ADMIN — ACETAMINOPHEN 650 MG: 325 TABLET ORAL at 17:30

## 2019-08-26 RX ADMIN — LIDOCAINE 1 PATCH: 50 PATCH CUTANEOUS at 22:10

## 2019-08-26 RX ADMIN — FOLIC ACID 1 MG: 1 TABLET ORAL at 17:30

## 2019-08-26 RX ADMIN — Medication 10 ML: at 21:42

## 2019-08-26 RX ADMIN — IPRATROPIUM BROMIDE AND ALBUTEROL SULFATE 3 ML: .5; 3 SOLUTION RESPIRATORY (INHALATION) at 11:21

## 2019-08-26 RX ADMIN — IPRATROPIUM BROMIDE AND ALBUTEROL SULFATE 3 ML: .5; 3 SOLUTION RESPIRATORY (INHALATION) at 07:19

## 2019-08-26 RX ADMIN — BUPRENORPHINE AND NALOXONE 2 TABLET: 8; 2 TABLET SUBLINGUAL at 17:30

## 2019-08-26 RX ADMIN — GABAPENTIN 100 MG: 100 CAPSULE ORAL at 21:41

## 2019-08-26 RX ADMIN — GABAPENTIN 100 MG: 100 CAPSULE ORAL at 14:29

## 2019-08-27 ENCOUNTER — APPOINTMENT (OUTPATIENT)
Dept: NEUROLOGY | Facility: HOSPITAL | Age: 44
End: 2019-08-27

## 2019-08-27 VITALS
HEIGHT: 63 IN | SYSTOLIC BLOOD PRESSURE: 91 MMHG | OXYGEN SATURATION: 97 % | WEIGHT: 126.8 LBS | HEART RATE: 79 BPM | DIASTOLIC BLOOD PRESSURE: 59 MMHG | BODY MASS INDEX: 22.47 KG/M2 | RESPIRATION RATE: 16 BRPM | TEMPERATURE: 97.7 F

## 2019-08-27 LAB
ANION GAP SERPL CALCULATED.3IONS-SCNC: 13.4 MMOL/L (ref 5–15)
BASOPHILS # BLD AUTO: 0 10*3/MM3 (ref 0–0.2)
BASOPHILS NFR BLD AUTO: 0.3 % (ref 0–1.5)
BUN BLD-MCNC: 9 MG/DL (ref 8–20)
BUN/CREAT SERPL: 9 (ref 5.4–26.2)
CALCIUM SPEC-SCNC: 8.9 MG/DL (ref 8.9–10.3)
CHLORIDE SERPL-SCNC: 107 MMOL/L (ref 101–111)
CO2 SERPL-SCNC: 22 MMOL/L (ref 22–32)
CREAT BLD-MCNC: 1 MG/DL (ref 0.4–1)
D-LACTATE SERPL-SCNC: NORMAL MMOL/L
DEPRECATED RDW RBC AUTO: 48.6 FL (ref 37–54)
EOSINOPHIL # BLD AUTO: 0.2 10*3/MM3 (ref 0–0.4)
EOSINOPHIL NFR BLD AUTO: 1.7 % (ref 0.3–6.2)
ERYTHROCYTE [DISTWIDTH] IN BLOOD BY AUTOMATED COUNT: 14.7 % (ref 12.3–15.4)
GFR SERPL CREATININE-BSD FRML MDRD: 61 ML/MIN/1.73
GLUCOSE BLD-MCNC: 98 MG/DL (ref 65–99)
HCT VFR BLD AUTO: 30.8 % (ref 34–46.6)
HGB BLD-MCNC: 10.1 G/DL (ref 12–15.9)
LYMPHOCYTES # BLD AUTO: 4 10*3/MM3 (ref 0.7–3.1)
LYMPHOCYTES NFR BLD AUTO: 44.2 % (ref 19.6–45.3)
MCH RBC QN AUTO: 30.1 PG (ref 26.6–33)
MCHC RBC AUTO-ENTMCNC: 32.7 G/DL (ref 31.5–35.7)
MCV RBC AUTO: 92.3 FL (ref 79–97)
MONOCYTES # BLD AUTO: 0.6 10*3/MM3 (ref 0.1–0.9)
MONOCYTES NFR BLD AUTO: 6.5 % (ref 5–12)
NEUTROPHILS # BLD AUTO: 4.3 10*3/MM3 (ref 1.7–7)
NEUTROPHILS NFR BLD AUTO: 47.3 % (ref 42.7–76)
NRBC BLD AUTO-RTO: 0 /100 WBC (ref 0–0.2)
PLATELET # BLD AUTO: 269 10*3/MM3 (ref 140–450)
PMV BLD AUTO: 9.2 FL (ref 6–12)
POTASSIUM BLD-SCNC: 3.4 MMOL/L (ref 3.6–5.1)
POTASSIUM BLD-SCNC: 4.6 MMOL/L (ref 3.6–5.1)
RBC # BLD AUTO: 3.34 10*6/MM3 (ref 3.77–5.28)
SODIUM BLD-SCNC: 139 MMOL/L (ref 136–144)
T3FREE SERPL-MCNC: 2.79 PG/ML (ref 2.39–6.79)
WBC NRBC COR # BLD: 9.1 10*3/MM3 (ref 3.4–10.8)

## 2019-08-27 PROCEDURE — 95816 EEG AWAKE AND DROWSY: CPT | Performed by: PSYCHIATRY & NEUROLOGY

## 2019-08-27 PROCEDURE — 80048 BASIC METABOLIC PNL TOTAL CA: CPT | Performed by: HOSPITALIST

## 2019-08-27 PROCEDURE — 99213 OFFICE O/P EST LOW 20 MIN: CPT | Performed by: NURSE PRACTITIONER

## 2019-08-27 PROCEDURE — 96372 THER/PROPH/DIAG INJ SC/IM: CPT

## 2019-08-27 PROCEDURE — G0378 HOSPITAL OBSERVATION PER HR: HCPCS

## 2019-08-27 PROCEDURE — 84481 FREE ASSAY (FT-3): CPT | Performed by: INTERNAL MEDICINE

## 2019-08-27 PROCEDURE — 99217 PR OBSERVATION CARE DISCHARGE MANAGEMENT: CPT | Performed by: INTERNAL MEDICINE

## 2019-08-27 PROCEDURE — 95816 EEG AWAKE AND DROWSY: CPT

## 2019-08-27 PROCEDURE — 85025 COMPLETE CBC W/AUTO DIFF WBC: CPT | Performed by: HOSPITALIST

## 2019-08-27 PROCEDURE — 84132 ASSAY OF SERUM POTASSIUM: CPT | Performed by: INTERNAL MEDICINE

## 2019-08-27 PROCEDURE — 94799 UNLISTED PULMONARY SVC/PX: CPT

## 2019-08-27 PROCEDURE — 25010000002 CYANOCOBALAMIN PER 1000 MCG: Performed by: HOSPITALIST

## 2019-08-27 PROCEDURE — 25010000002 CEFTRIAXONE PER 250 MG: Performed by: HOSPITALIST

## 2019-08-27 RX ORDER — BUPRENORPHINE AND NALOXONE 8; 2 MG/1; MG/1
2 FILM, SOLUBLE BUCCAL; SUBLINGUAL DAILY
Start: 2019-08-27 | End: 2020-03-02

## 2019-08-27 RX ORDER — GABAPENTIN 100 MG/1
100 CAPSULE ORAL EVERY 8 HOURS SCHEDULED
Qty: 10 CAPSULE | Refills: 0 | Status: SHIPPED | OUTPATIENT
Start: 2019-08-27 | End: 2020-03-02

## 2019-08-27 RX ORDER — CEFDINIR 300 MG/1
300 CAPSULE ORAL 2 TIMES DAILY
Qty: 10 CAPSULE | Refills: 0 | Status: SHIPPED | OUTPATIENT
Start: 2019-08-27 | End: 2019-09-01

## 2019-08-27 RX ORDER — OLANZAPINE 10 MG/1
20 TABLET ORAL NIGHTLY
Start: 2019-08-27 | End: 2020-03-02

## 2019-08-27 RX ADMIN — CYANOCOBALAMIN 1000 MCG: 1000 INJECTION, SOLUTION INTRAMUSCULAR; SUBCUTANEOUS at 08:47

## 2019-08-27 RX ADMIN — OYSTER SHELL CALCIUM WITH VITAMIN D 1 TABLET: 500; 200 TABLET, FILM COATED ORAL at 08:47

## 2019-08-27 RX ADMIN — CYANOCOBALAMIN TAB 1000 MCG 1000 MCG: 1000 TAB at 08:47

## 2019-08-27 RX ADMIN — POTASSIUM CHLORIDE 40 MEQ: 1500 TABLET, EXTENDED RELEASE ORAL at 06:07

## 2019-08-27 RX ADMIN — GABAPENTIN 100 MG: 100 CAPSULE ORAL at 06:07

## 2019-08-27 RX ADMIN — LITHIUM CARBONATE 300 MG: 300 CAPSULE, GELATIN COATED ORAL at 08:48

## 2019-08-27 RX ADMIN — CEFTRIAXONE SODIUM 1 G: 1 INJECTION, POWDER, FOR SOLUTION INTRAMUSCULAR; INTRAVENOUS at 12:05

## 2019-08-27 RX ADMIN — FOLIC ACID 1 MG: 1 TABLET ORAL at 08:47

## 2019-08-27 RX ADMIN — LAMOTRIGINE 200 MG: 100 TABLET ORAL at 08:47

## 2019-08-27 RX ADMIN — FLUTICASONE PROPIONATE 2 SPRAY: 50 SPRAY, METERED NASAL at 08:49

## 2019-08-27 RX ADMIN — Medication 3 ML: at 08:48

## 2019-08-27 RX ADMIN — IPRATROPIUM BROMIDE AND ALBUTEROL SULFATE 3 ML: .5; 3 SOLUTION RESPIRATORY (INHALATION) at 14:00

## 2019-08-27 RX ADMIN — IPRATROPIUM BROMIDE AND ALBUTEROL SULFATE 3 ML: .5; 3 SOLUTION RESPIRATORY (INHALATION) at 06:50

## 2019-08-28 ENCOUNTER — READMISSION MANAGEMENT (OUTPATIENT)
Dept: CALL CENTER | Facility: HOSPITAL | Age: 44
End: 2019-08-28

## 2019-08-28 LAB
BACTERIA SPEC AEROBE CULT: NORMAL
BACTERIA SPEC AEROBE CULT: NORMAL

## 2019-08-28 NOTE — OUTREACH NOTE
Prep Survey      Responses   Facility patient discharged from?  [facility]   Is patient eligible?  Yes   Discharge diagnosis  SYNCOPE AND COLLAPSE   Does the patient have one of the following disease processes/diagnoses(primary or secondary)?  Other   Does the patient have Home health ordered?  No   Is there a DME ordered?  No   Medication alerts for this patient  SEE AVS   Prep survey completed?  Yes          Prachi Palma LPN

## 2019-08-29 ENCOUNTER — READMISSION MANAGEMENT (OUTPATIENT)
Dept: CALL CENTER | Facility: HOSPITAL | Age: 44
End: 2019-08-29

## 2019-08-29 NOTE — OUTREACH NOTE
Medical Week 1 Survey      Responses   Facility patient discharged from?  [facility]   Does the patient have one of the following disease processes/diagnoses(primary or secondary)?  Other   Is there a successful TCM telephone encounter documented?  No   Week 1 attempt successful?  Yes   Call start time  1438   Call end time  1440   Discharge diagnosis  SYNCOPE AND COLLAPSE   Meds reviewed with patient/caregiver?  Yes   Does the patient have all medications ordered at discharge?  No   What is keeping the patient from filling the prescriptions?  -- [States she is going to pick them up]   Nursing Interventions  Nurse provided patient education   Does the patient have a primary care provider?   Yes   Does the patient have an appointment with their PCP within 7 days of discharge?  No   What is preventing the patient from scheduling follow up appointments within 7 days of discharge?  Haven't had time   Nursing Interventions  Educated patient on importance of making appointment, Advised patient to make appointment   Has home health visited the patient within 72 hours of discharge?  N/A   Did the patient receive a copy of their discharge instructions?  Yes   Nursing interventions  Reviewed instructions with patient   What is the patient's perception of their health status since discharge?  Improving   Is the patient/caregiver able to teach back the hierarchy of who to call/visit for symptoms/problems? PCP, Specialist, Home health nurse, Urgent Care, ED, 911  Yes   Week 1 call completed?  Yes   Graduated  Yes   Did the patient feel the follow up calls were helpful during their recovery period?  Yes   Was the number of calls appropriate?  Yes   Graduated/Revoked comments  Patient stated she has no questions or needs at this time          Rosita Medina LPN

## 2019-09-03 NOTE — DISCHARGE SUMMARY
"  Date of Admission: 8/23/2019    Date of Discharge:  9/3/2019    Length of stay:  LOS: 0 days     Discharge Diagnosis:   Syncope  Acute kidney injury-resolved  Normal gap metabolic acidosis  Smoking addiction  Bipolar disorder  Generalized anxiety disorder    Presenting Problem/History of Present Illness  Active Hospital Problems    Diagnosis  POA   • **Syncope and collapse [R55]  Yes   • Vitamin B12 deficiency [E53.8]  Yes   • Metabolic acidosis [E87.2]  Yes   • Nasal fracture [S02.2XXA]  Yes   • Bipolar I disorder, most recent episode depressed (CMS/[facility]) [F31.30]  Unknown   • Generalized anxiety disorder [F41.1]  Unknown      Resolved Hospital Problems   No resolved problems to display.        Hospital Course  Patient is a 44 y.o. female presented with syncope.  Patient was admitted and her CT of the head was negative.  The patient had a urine tox screen done which was negative as well.  The patient was seen by neurology and some of her medications or decreased by behavioral health as they felt that they could be contributing to her \"syncope\" diagnosis.  The patient did have an elevated lithium level on lithium was adjusted in terms of his dose.  The patient was seen by behavioral health and felt to be improved enough to be discharged.  The patient was empirically treated with Rocephin for possible infection.  The patient improved and was cleared by behavioral health for discharge.  The patient was also cleared by the intensivist service and neurology.    The patient was a full code at the time of discharge.  The patient was on a regular diet.  The patient's medications were as outlined in that section of the report.  The patient had pending cultures which were not completely resulted but were negative to date.  The patient was to follow-up with her behavioral health physician as they requested as well as with her primary care physician in 3 to 5 days.  The patient was not to drive for at least 3 months or until " cleared by neurology.  The patient was to resume her previous activities as she was able to tolerate them.  She was discharged in satisfactory condition.    Past Medical History:     Past Medical History:   Diagnosis Date   • ADHD    • Anxiety    • Bipolar 1 disorder (CMS/HCC)    • Depression    • PTSD (post-traumatic stress disorder)    • Substance abuse (CMS/HCC)    • Vitamin D deficiency        Past Surgical History:     Past Surgical History:   Procedure Laterality Date   •  SECTION     • DILATATION AND CURETTAGE      x2   • TONSILLECTOMY         Social History:   Social History     Socioeconomic History   • Marital status: Single     Spouse name: Not on file   • Number of children: Not on file   • Years of education: Not on file   • Highest education level: Not on file   Tobacco Use   • Smoking status: Current Every Day Smoker     Packs/day: 2.00     Types: Cigarettes   Substance and Sexual Activity   • Alcohol use: No     Frequency: Never   • Drug use: No       Procedures Performed         Consults:   Consults     Date and Time Order Name Status Description    2019 1610 Inpatient Psychiatrist Consult Completed     2019 1259 Intensivist (on-call MD unless specified) Completed     2019 1134 Hospitalist (on-call MD unless specified) Completed           Pertinent Test Results:     Lab Results (last 72 hours)     Procedure Component Value Units Date/Time    T3, Free [[id number]]  (Normal) Collected:  19 1226    Specimen:  Blood Updated:  19 1342     T3, Free 2.79 pg/mL      Comment: Results may be falsely increased if patient taking Biotin.       Potassium [[id number]]  (Normal) Collected:  19 1226    Specimen:  Blood Updated:  19 1334     Potassium 4.6 mmol/L     POC Lactate [[id number]] Collected:  19 1446    Specimen:  Blood Updated:  19 1252     Lactate --     Comment: Not performed Point of Care, see  [id number] for results       Basic Metabolic Panel  [[id number]]  (Abnormal) Collected:  08/27/19 0357    Specimen:  Blood Updated:  08/27/19 0455     Glucose 98 mg/dL      BUN 9 mg/dL      Creatinine 1.00 mg/dL      Sodium 139 mmol/L      Potassium 3.4 mmol/L      Chloride 107 mmol/L      CO2 22.0 mmol/L      Calcium 8.9 mg/dL      eGFR Non African Amer 61 mL/min/1.73      BUN/Creatinine Ratio 9.0     Anion Gap 13.4 mmol/L     CBC & Differential [[id number]] Collected:  08/27/19 0357    Specimen:  Blood Updated:  08/27/19 0449    Narrative:       The following orders were created for panel order CBC & Differential.  Procedure                               Abnormality         Status                     ---------                               -----------         ------                     CBC Auto Differential[[id number]]        Abnormal            Final result                 Please view results for these tests on the individual orders.    CBC Auto Differential [[id number]]  (Abnormal) Collected:  08/27/19 0357    Specimen:  Blood Updated:  08/27/19 0449     WBC 9.10 10*3/mm3      RBC 3.34 10*6/mm3      Hemoglobin 10.1 g/dL      Hematocrit 30.8 %      MCV 92.3 fL      MCH 30.1 pg      MCHC 32.7 g/dL      RDW 14.7 %      RDW-SD 48.6 fl      MPV 9.2 fL      Platelets 269 10*3/mm3      Neutrophil % 47.3 %      Lymphocyte % 44.2 %      Monocyte % 6.5 %      Eosinophil % 1.7 %      Basophil % 0.3 %      Neutrophils, Absolute 4.30 10*3/mm3      Lymphocytes, Absolute 4.00 10*3/mm3      Monocytes, Absolute 0.60 10*3/mm3      Eosinophils, Absolute 0.20 10*3/mm3      Basophils, Absolute 0.00 10*3/mm3      nRBC 0.0 /100 WBC     Vitamin B12 [[id number]]  (Normal) Collected:  08/26/19 0333    Specimen:  Blood Updated:  08/26/19 0507     Vitamin B-12 186 pg/mL      Comment: Results may be falsely increased if patient taking Biotin.       TSH [[id number]]  (Abnormal) Collected:  08/26/19 0333    Specimen:  Blood Updated:  08/26/19 0507     TSH 7.060 mIU/mL      Comment: Results may be  falsely decreased if patient taking Biotin.       T4, Free [[id number]]  (Normal) Collected:  08/26/19 0333    Specimen:  Blood Updated:  08/26/19 0507     Free T4 0.78 ng/dL      Comment: Results may be falsely increased if patient taking Biotin.       Sedimentation Rate [[id number]]  (Normal) Collected:  08/26/19 0333    Specimen:  Blood Updated:  08/26/19 0504     Sed Rate 17 mm/hr     Basic Metabolic Panel [[id number]]  (Abnormal) Collected:  08/26/19 0333    Specimen:  Blood Updated:  08/26/19 0500     Glucose 78 mg/dL      BUN 8 mg/dL      Creatinine 0.90 mg/dL      Sodium 140 mmol/L      Potassium 3.6 mmol/L      Chloride 111 mmol/L      CO2 22.0 mmol/L      Calcium 8.4 mg/dL      eGFR Non African Amer 69 mL/min/1.73      BUN/Creatinine Ratio 8.9     Anion Gap 10.6 mmol/L     CBC & Differential [[id number]] Collected:  08/26/19 0333    Specimen:  Blood Updated:  08/26/19 0419    Narrative:       The following orders were created for panel order CBC & Differential.  Procedure                               Abnormality         Status                     ---------                               -----------         ------                     CBC Auto Differential[[id number]]        Abnormal            Final result                 Please view results for these tests on the individual orders.    CBC Auto Differential [[id number]]  (Abnormal) Collected:  08/26/19 0333    Specimen:  Blood Updated:  08/26/19 0419     WBC 8.60 10*3/mm3      RBC 3.07 10*6/mm3      Hemoglobin 9.3 g/dL      Hematocrit 28.8 %      MCV 93.7 fL      MCH 30.2 pg      MCHC 32.2 g/dL      RDW 15.1 %      RDW-SD 50.8 fl      MPV 8.9 fL      Platelets 248 10*3/mm3      Neutrophil % 39.8 %      Lymphocyte % 51.0 %      Monocyte % 7.0 %      Eosinophil % 1.8 %      Basophil % 0.4 %      Neutrophils, Absolute 3.40 10*3/mm3      Lymphocytes, Absolute 4.40 10*3/mm3      Monocytes, Absolute 0.60 10*3/mm3      Eosinophils, Absolute 0.20 10*3/mm3      Basophils,  Absolute 0.00 10*3/mm3      nRBC 0.1 /100 WBC     Sodium, Urine, Random - Urine, Clean Catch [[id number]] Collected:  08/25/19 1850    Specimen:  Urine, Clean Catch Updated:  08/25/19 1947     Sodium, Urine 107 mmol/L     Potassium, Urine, Random - Urine, Clean Catch [[id number]] Collected:  08/25/19 1850    Specimen:  Urine, Clean Catch Updated:  08/25/19 1947     Potassium, Urine 12.2 mmol/L     Chloride, Urine, Random - Urine, Clean Catch [[id number]] Collected:  08/25/19 1850    Specimen:  Urine, Clean Catch Updated:  08/25/19 1947     Chloride, Urine 99 mmol/L     D-dimer, Quantitative [[id number]]  (Abnormal) Collected:  08/25/19 1400    Specimen:  Blood Updated:  08/25/19 1442     D-Dimer, Quantitative 1.17 MCGFEU/mL     Narrative:       Reference Range  --------------------------------------------------------------------     < 0.50   Negative Predictive Value  0.50-0.59   Indeterminate    >= 0.60   Probable VTE             A very low percentage of patients with DVT may yield D-Dimer results   below the cut-off of 0.50 MCGFEU/mL.  This is known to be more   prevalent in patients with distal DVT.             Results of this test should always be interpreted in conjunction with   the patient's medical history, clinical presentation and other   findings.  Clinical diagnosis should not be based on the result of   INNOVANCE D-Dimer alone.    MRSA Screen Culture - Swab, Nares [[id number]]  (Normal) Collected:  08/23/19 1746    Specimen:  Swab from Nares Updated:  08/25/19 0942     MRSA SCREEN CX No Methicillin Resistant Staphylococcus aureus isolated    Basic Metabolic Panel [[id number]]  (Abnormal) Collected:  08/25/19 0428    Specimen:  Blood Updated:  08/25/19 0523     Glucose 87 mg/dL      BUN 8 mg/dL      Creatinine 1.10 mg/dL      Sodium 144 mmol/L      Potassium 2.9 mmol/L      Chloride 116 mmol/L      CO2 23.0 mmol/L      Calcium 8.4 mg/dL      eGFR Non African Amer 54 mL/min/1.73      BUN/Creatinine Ratio 7.3      Anion Gap 7.9 mmol/L     Magnesium [[id number]]  (Normal) Collected:  08/25/19 0428    Specimen:  Blood Updated:  08/25/19 0517     Magnesium 1.8 mg/dL     CBC & Differential [[id number]] Collected:  08/25/19 0428    Specimen:  Blood Updated:  08/25/19 0448    Narrative:       The following orders were created for panel order CBC & Differential.  Procedure                               Abnormality         Status                     ---------                               -----------         ------                     CBC Auto Differential[[id number]]        Abnormal            Final result                 Please view results for these tests on the individual orders.    CBC Auto Differential [[id number]]  (Abnormal) Collected:  08/25/19 0428    Specimen:  Blood Updated:  08/25/19 0448     WBC 7.50 10*3/mm3      RBC 3.04 10*6/mm3      Hemoglobin 9.2 g/dL      Hematocrit 27.9 %      MCV 91.8 fL      MCH 30.2 pg      MCHC 32.9 g/dL      RDW 14.7 %      RDW-SD 48.1 fl      MPV 8.8 fL      Platelets 271 10*3/mm3      Neutrophil % 45.9 %      Lymphocyte % 45.6 %      Monocyte % 6.6 %      Eosinophil % 1.6 %      Basophil % 0.3 %      Neutrophils, Absolute 3.40 10*3/mm3      Lymphocytes, Absolute 3.40 10*3/mm3      Monocytes, Absolute 0.50 10*3/mm3      Eosinophils, Absolute 0.10 10*3/mm3      Basophils, Absolute 0.00 10*3/mm3      nRBC 0.2 /100 WBC           Results for orders placed during the hospital encounter of 08/23/19   Adult Transthoracic Echo Complete W/ Cont if Necessary Per Protocol    Narrative · Estimated EF = 60%.  · Left ventricular systolic function is normal.       Indications  Syncope    Technically satisfactory study.  Mitral valve is structurally normal.  Tricuspid valve is structurally normal.  Aortic valve is structurally normal.  Pulmonic valve could not be well visualized.  No evidence for mitral tricuspid or aortic regurgitation is seen by   Doppler study.  Left atrium is normal in size.  Right atrium  is normal in size.  Left ventricle is normal in size and contractility with ejection fraction   of 60%.  Right ventricle is normal in size.  Atrial septum is intact.  Aorta is normal.  No pericardial effusion or intracardiac thrombus is seen.    Impression  Structurally and functionally normal cardiac valves.  Normal echo Doppler study.  Left ventricular ejection fraction is 60%.           Imaging Results (all)     Procedure Component Value Units Date/Time    CT Chest With Contrast [[id number]] Collected:  08/25/19 1713     Updated:  08/25/19 1722    Narrative:       CT CHEST W CONTRAST-     Date of Exam: 8/25/2019 4:45 PM     Indication: Chest pain, acute, PE suspected, high pretest prob;  R55-Syncope and collapse; S02.2XXA-Fracture of nasal bones, initial  encounter for closed fracture; E87.6-Hypokalemia; T79.6XXA-Traumatic  ischemia of muscle, initial encounter; N17.9-Acute kidney failure,  unspecified; E87.2-Acidosis; N39.0-Urinary tract infection, site not  specified. Smoker     Comparison: Chest radiograph 08/23/2019      Technique: Serial and axial CT images of the chest were obtained  following the uneventful intravenous administration of 100 cc Isovue 300  contrast. Reconstructions in the coronal and sagittal planes were also  performed.  Automated exposure control and iterative reconstruction  methods were used.     FINDINGS:  Pulmonary arteries: Adequate opacification pulmonary arteries. No  intraluminal filling defects to suggest pulmonary embolus. Pulmonary  arteries are normal in caliber.     Hilum and Mediastinum: No pathologically enlarged lymph nodes. Calcified  lymph nodes in the left hilum. Normal heart size.  No significant  coronary artery atherosclerotic disease.  No pericardial effusion.   Unremarkable thoracic aorta and pulmonary arteries. Thickened walls of  the distal esophagus.     Lung Parenchyma and Pleura: Mild subsegmental atelectasis. No focal  consolidations.  No suspicious pulmonary  nodules.  No endobronchial  lesions.  No significant pleural effusions. Mild emphysema.     Upper Abdomen: Status post cholecystectomy. Bile duct measures up to 9  mm in the pancreatic head.     Soft tissues: Unremarkable.     Osseous structures: No aggressive focal lytic or sclerotic osseous  lesions.       Impression:       1.No findings of pulmonary embolus.  2.Emphysema with mild bibasilar subsegmental atelectasis.  3.Changes of prior granulomatous disease exposure.  4.Mild wall thickening distal esophagus, question reflux, esophagitis or  less likely malignancy.  5.Status post cholecystectomy with mildly prominent common bile duct.  Correlate with bilirubin.           Electronically Signed By-Judith Lindsey On:8/25/2019 5:19 PM  This report was finalized on [id number] by  Judith Lindsey, .    XR Abdomen KUB [[id number]] Collected:  08/23/19 2116     Updated:  08/23/19 2119    Narrative:       DATE OF EXAM:  8/23/2019 6:55 PM     PROCEDURE:  XR ABDOMEN KUB-     INDICATIONS:  abd pain; R55-Syncope and collapse; S02.2XXA-Fracture of nasal bones,  initial encounter for closed fracture; E87.6-Hypokalemia;  T79.6XXA-Traumatic ischemia of muscle, initial encounter; N17.9-Acute  kidney failure, unspecified; E87.2-Acidosis; N39.0-Urinary tract  infection, site not specified     COMPARISON:  No Comparisons Available     TECHNIQUE:   Single radiographic view of the abdomen was obtained.     FINDINGS:  There is a large amount of stool throughout the colon. No stool is seen  in the rectum. Small bowel does not appear distended. Right diaphragm is  not entirely included. There are right upper quadrant surgical clips.  There is a 2 mm left pelvic calcification which is probably a  phlebolith. No bone lesion is seen.       Impression:       1. Large amount of stool throughout the colon, consistent with  constipation..      Electronically Signed By-Karina Sinha On:8/23/2019 9:16 PM  This report was finalized on [id number] by   Karina Sinha, .    CT Facial Bones Without Contrast [[id number]] Collected:  08/23/19 1059     Updated:  08/23/19 1104    Narrative:          DATE OF EXAM:   8/23/2019 10:40 AM     PROCEDURE:  CT FACIAL BONES WO CONTRAST-     INDICATIONS:   trauma s/p syncope     COMPARISON:   No Comparisons Available     TECHNIQUE:   Routine axial images were obtained through facial bones without the  administration of intravenous contrast. Reconstructed coronal and  sagittal images were also obtained. Automated exposure control and  iterative reconstruction methods were used.        FINDINGS:   There is an age-indeterminate fracture of the nasal bone which is not  significantly displaced.  Bony nasal septum appears intact.  Anterior  nasal spine is intact.  Zygomatic arches and orbital rims also appear  intact.  There is a polyp or mucous retention cyst within the anterior  medial left maxillary sinus and some minimal mucosal thickening in the  right sphenoid sinus.  No air-fluid level seen to suggest acute  sinusitis or hemorrhage.  The globes and orbits appearing intact.   Lamina papyracea are intact bilaterally.  Visualized soft tissues are  unremarkable.       Impression:          1.  Age-indeterminate nondisplaced fracture of the nasal bone.  No other  acute facial bone fracture identified.  2.  Changes of chronic left maxillary and right sphenoid sinusitis.     Electronically Signed By-Tony Leon On:8/23/2019 11:02 AM  This report was finalized on [id number] by  Tony Leon, .    CT Head Without Contrast [[id number]] Collected:  08/23/19 1057     Updated:  08/23/19 1102    Narrative:       CT HEAD WO CONTRAST-     Date of Exam: 8/23/2019 10:40 AM     Indication: syncope.  Hit head and face bruising swelling above left eye        Comparison Exams: None available.     Technique: Multiple axial images were obtained from the skull base to  the vertex without the administration of IV contrast. The axial data was  used to  generate reformatted images in the coronal and sagittal planes.  Automated exposure control and iterative reconstruction methods were  used.     FINDINGS:  There is no acute infarct or hemorrhage. No abnormal extra-axial fluid  collections are seen. There is no mass effect or hydrocephalus.     There is no evidence of skull fracture. Visualized paranasal sinuses and  mastoid air cells are clear.      The globes and orbits are within normal limits.       Impression:          1. No acute intracranial abnormality.     Electronically Signed By-Tony Leon On:8/23/2019 10:59 AM  This report was finalized on [id number] by  Tony Leon, .    XR Chest 1 View [[id number]] Collected:  08/23/19 1032     Updated:  08/23/19 1035    Narrative:       DATE OF EXAM:  8/23/2019 9:51 AM     PROCEDURE:  XR CHEST 1 VW-     INDICATIONS:  Syncope. Shortness of breath. Smoker.     COMPARISON:  None available.     TECHNIQUE:   Single radiographic AP view of the chest was obtained.     FINDINGS:  The study is mildly limited by patient positioning. Overlying artifacts.  The lungs are clear. No pneumothorax. Cardiomediastinal contours within  normal limits. No acute osseous normality is identified. Cholecystectomy  clips.        Impression:       Mildly limited study demonstrating no active disease.     Electronically Signed By-Giacomo Sparks On:8/23/2019 10:33 AM  This report was finalized on [id number] by  Giacomo Sparks, .            Condition on Discharge: Satisfactory    Vital Signs       Physical Exam:    General Appearance:    Alert, cooperative, in no acute distress   Head:    Normocephalic, without obvious abnormality, atraumatic   Eyes:            Lids and lashes normal, conjunctivae and sclerae normal, no   icterus, no pallor, corneas clear, PERRLA   Ears:    Ears appear intact with no abnormalities noted   Throat:   No oral lesions, no thrush, oral mucosa moist   Neck:   No adenopathy, supple, trachea midline, no thyromegaly,  no     carotid bruit, no JVD   Back:     No kyphosis present, no scoliosis present, no skin lesions,       erythema or scars, no tenderness to percussion or                   palpation,   range of motion normal   Lungs:     Clear to auscultation,respirations regular, even and                   unlabored    Heart:    Regular rhythm and normal rate, normal S1 and S2, no            murmur, no gallop, no rub, no click   Breast Exam:    Deferred   Abdomen:     Normal bowel sounds, no masses, no organomegaly, soft        non-tender, non-distended, no guarding, no rebound                 tenderness   Genitalia:    Deferred   Extremities:   Moves all extremities well, no edema, no cyanosis, no              redness   Pulses:   Pulses palpable and equal bilaterally   Skin:   No bleeding, bruising or rash   Lymph nodes:   No palpable adenopathy   Neurologic:   Cranial nerves 2 - 12 grossly intact, sensation intact, DTR        present and equal bilaterally         Discharge Disposition  Home or Self Care    Discharge Medications     Discharge Medications      New Medications      Instructions Start Date   gabapentin 100 MG capsule  Commonly known as:  NEURONTIN  Replaces:  gabapentin 600 MG tablet   100 mg, Oral, Every 8 Hours Scheduled         Changes to Medications      Instructions Start Date   buprenorphine-naloxone 8-2 MG film film  Commonly known as:  SUBOXONE  What changed:    · how much to take  · how to take this  · when to take this   2 films, Sublingual, Daily      OLANZapine 10 MG tablet  Commonly known as:  zyPREXA  What changed:  how much to take   20 mg, Oral, Nightly         Continue These Medications      Instructions Start Date   albuterol sulfate  (90 Base) MCG/ACT inhaler  Commonly known as:  PROVENTIL HFA;VENTOLIN HFA;PROAIR HFA   2 puffs, Inhalation, Every 6 Hours PRN      docusate sodium 100 MG capsule  Commonly known as:  COLACE   100 mg, Oral, 2 Times Daily      fluticasone 50 MCG/ACT nasal  spray  Commonly known as:  FLONASE   2 sprays, Nasal, Daily      LAMICTAL 100 MG tablet  Generic drug:  lamoTRIgine   200 mg, Oral, Every Night at Bedtime      lithium carbonate 300 MG capsule   300 mg, Oral, Daily      polyethylene glycol packet  Commonly known as:  MIRALAX   17 g, Oral, Daily PRN      umeclidinium-vilanterol 62.5-25 MCG/INH aerosol powder  inhaler  Commonly known as:  ANORO ELLIPTA   1 puff, Inhalation, Daily - RT      venlafaxine  MG 24 hr capsule  Commonly known as:  EFFEXOR-XR   225 mg, Oral, Daily         Stop These Medications    cetirizine 10 MG tablet  Commonly known as:  zyrTEC     ciprofloxacin 500 MG tablet  Commonly known as:  CIPRO     gabapentin 600 MG tablet  Commonly known as:  NEURONTIN  Replaced by:  gabapentin 100 MG capsule     ramelteon 8 MG tablet  Commonly known as:  ROZEREM     vitamin D3 5000 units capsule capsule        ASK your doctor about these medications      Instructions Start Date   cefdinir 300 MG capsule  Commonly known as:  OMNICEF  Ask about: Should I take this medication?   300 mg, Oral, 2 Times Daily             Discharge Diet:     Activity at Discharge:     Follow-up Appointments  No future appointments.      Test Results Pending at Discharge       Risk for Readmission (LACE) Score: 5 (8/27/2019  6:00 AM)          Gemma Hyatt MD  09/03/19  5:10 PM    Time: Discharge 35 min

## 2019-09-16 ENCOUNTER — LAB (OUTPATIENT)
Dept: LAB | Facility: HOSPITAL | Age: 44
End: 2019-09-16

## 2019-09-16 ENCOUNTER — TRANSCRIBE ORDERS (OUTPATIENT)
Dept: LAB | Facility: HOSPITAL | Age: 44
End: 2019-09-16
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
## 2019-09-16 LAB
CK SERPL-CCNC: 92 U/L (ref 20–180)
FOLATE SERPL-MCNC: 6.1 NG/ML (ref 5.9–24.8)
TSH SERPL DL<=0.05 MIU/L-ACNC: 3.28 UIU/ML (ref 0.34–5.6)

## 2019-09-16 PROCEDURE — 82746 ASSAY OF FOLIC ACID SERUM: CPT

## 2019-09-16 PROCEDURE — 84443 ASSAY THYROID STIM HORMONE: CPT

## 2019-09-16 PROCEDURE — 82085 ASSAY OF ALDOLASE: CPT

## 2019-09-16 PROCEDURE — 84446 ASSAY OF VITAMIN E: CPT

## 2019-09-16 PROCEDURE — 82550 ASSAY OF CK (CPK): CPT

## 2019-09-16 PROCEDURE — 36415 COLL VENOUS BLD VENIPUNCTURE: CPT

## 2019-09-17 LAB — ALDOLASE SERPL-CCNC: 5.1 U/L (ref 3.3–10.3)

## 2019-09-18 LAB
A-TOCOPHEROL VIT E SERPL-MCNC: 6.8 MG/L (ref 7–25.1)
GAMMA TOCOPHEROL SERPL-MCNC: 0.8 MG/L (ref 0.5–5.5)

## 2019-09-25 PROCEDURE — 93227 XTRNL ECG REC<48 HR R&I: CPT | Performed by: INTERNAL MEDICINE

## 2019-11-20 ENCOUNTER — HOSPITAL ENCOUNTER (OUTPATIENT)
Dept: GENERAL RADIOLOGY | Facility: HOSPITAL | Age: 44
Discharge: HOME | End: 2019-11-20
Admitting: NURSE PRACTITIONER

## 2019-11-20 ENCOUNTER — TRANSCRIBE ORDERS (OUTPATIENT)
Dept: ADMINISTRATIVE | Facility: HOSPITAL | Age: 44
End: 2019-11-20

## 2019-11-20 DIAGNOSIS — J40 BRONCHITIS, NOT SPECIFIED AS ACUTE OR CHRONIC: Primary | ICD-10-CM

## 2019-11-20 DIAGNOSIS — R05.9 COUGH: ICD-10-CM

## 2019-11-20 DIAGNOSIS — J40 BRONCHITIS, NOT SPECIFIED AS ACUTE OR CHRONIC: ICD-10-CM

## 2019-11-20 PROCEDURE — 71046 X-RAY EXAM CHEST 2 VIEWS: CPT

## 2020-01-03 ENCOUNTER — TRANSCRIBE ORDERS (OUTPATIENT)
Dept: ADMINISTRATIVE | Facility: HOSPITAL | Age: 45
End: 2020-01-03

## 2020-01-03 DIAGNOSIS — B18.2 CHRONIC HEPATITIS C WITH HEPATIC COMA (HCC): Primary | ICD-10-CM

## 2020-01-09 ENCOUNTER — APPOINTMENT (OUTPATIENT)
Dept: ULTRASOUND IMAGING | Facility: HOSPITAL | Age: 45
End: 2020-01-09

## 2020-01-10 ENCOUNTER — APPOINTMENT (OUTPATIENT)
Dept: ULTRASOUND IMAGING | Facility: HOSPITAL | Age: 45
End: 2020-01-10

## 2020-01-23 ENCOUNTER — HOSPITAL ENCOUNTER (OUTPATIENT)
Dept: ULTRASOUND IMAGING | Facility: HOSPITAL | Age: 45
Discharge: HOME | End: 2020-01-23
Admitting: NURSE PRACTITIONER

## 2020-01-23 DIAGNOSIS — B18.2 CHRONIC HEPATITIS C WITH HEPATIC COMA (HCC): ICD-10-CM

## 2020-01-23 PROCEDURE — 76705 ECHO EXAM OF ABDOMEN: CPT

## 2020-02-24 ENCOUNTER — APPOINTMENT (OUTPATIENT)
Dept: CT IMAGING | Facility: HOSPITAL | Age: 45
End: 2020-02-24

## 2020-02-24 ENCOUNTER — APPOINTMENT (OUTPATIENT)
Dept: GENERAL RADIOLOGY | Facility: HOSPITAL | Age: 45
End: 2020-02-24

## 2020-02-24 ENCOUNTER — HOSPITAL ENCOUNTER (EMERGENCY)
Facility: HOSPITAL | Age: 45
Discharge: HOME | End: 2020-02-24
Attending: EMERGENCY MEDICINE | Admitting: EMERGENCY MEDICINE

## 2020-02-24 VITALS
TEMPERATURE: 97.7 F | OXYGEN SATURATION: 100 % | HEART RATE: 90 BPM | RESPIRATION RATE: 16 BRPM | BODY MASS INDEX: 16.22 KG/M2 | WEIGHT: 95 LBS | HEIGHT: 64 IN | SYSTOLIC BLOOD PRESSURE: 118 MMHG | DIASTOLIC BLOOD PRESSURE: 79 MMHG

## 2020-02-24 DIAGNOSIS — R07.9 CHEST PAIN, UNSPECIFIED TYPE: Primary | ICD-10-CM

## 2020-02-24 DIAGNOSIS — R10.84 GENERALIZED ABDOMINAL PAIN: ICD-10-CM

## 2020-02-24 DIAGNOSIS — R53.1 WEAKNESS: ICD-10-CM

## 2020-02-24 DIAGNOSIS — E87.6 HYPOKALEMIA: ICD-10-CM

## 2020-02-24 LAB
ALBUMIN SERPL-MCNC: 4 G/DL (ref 3.5–5.2)
ALBUMIN/GLOB SERPL: 1.5 G/DL
ALP SERPL-CCNC: 104 U/L (ref 39–117)
ALT SERPL W P-5'-P-CCNC: 23 U/L (ref 1–33)
ANION GAP SERPL CALCULATED.3IONS-SCNC: 10 MMOL/L (ref 5–15)
AST SERPL-CCNC: 17 U/L (ref 1–32)
BACTERIA UR QL AUTO: ABNORMAL /HPF
BASOPHILS # BLD AUTO: 0.1 10*3/MM3 (ref 0–0.2)
BASOPHILS NFR BLD AUTO: 0.7 % (ref 0–1.5)
BILIRUB SERPL-MCNC: 0.3 MG/DL (ref 0.2–1.2)
BILIRUB UR QL STRIP: NEGATIVE
BUN BLD-MCNC: 19 MG/DL (ref 6–20)
BUN/CREAT SERPL: 15.8 (ref 7–25)
CALCIUM SPEC-SCNC: 10.2 MG/DL (ref 8.6–10.5)
CHLORIDE SERPL-SCNC: 115 MMOL/L (ref 98–107)
CK SERPL-CCNC: 137 U/L (ref 20–180)
CLARITY UR: ABNORMAL
CO2 SERPL-SCNC: 11 MMOL/L (ref 22–29)
COLOR UR: YELLOW
CREAT BLD-MCNC: 1.2 MG/DL (ref 0.57–1)
DEPRECATED RDW RBC AUTO: 49 FL (ref 37–54)
EOSINOPHIL # BLD AUTO: 0 10*3/MM3 (ref 0–0.4)
EOSINOPHIL NFR BLD AUTO: 0.1 % (ref 0.3–6.2)
ERYTHROCYTE [DISTWIDTH] IN BLOOD BY AUTOMATED COUNT: 16.4 % (ref 12.3–15.4)
GFR SERPL CREATININE-BSD FRML MDRD: 49 ML/MIN/1.73
GLOBULIN UR ELPH-MCNC: 2.6 GM/DL
GLUCOSE BLD-MCNC: 94 MG/DL (ref 65–99)
GLUCOSE UR STRIP-MCNC: NEGATIVE MG/DL
HCT VFR BLD AUTO: 34.4 % (ref 34–46.6)
HGB BLD-MCNC: 11.4 G/DL (ref 12–15.9)
HGB UR QL STRIP.AUTO: ABNORMAL
HYALINE CASTS UR QL AUTO: ABNORMAL /LPF
KETONES UR QL STRIP: ABNORMAL
LEUKOCYTE ESTERASE UR QL STRIP.AUTO: ABNORMAL
LIPASE SERPL-CCNC: 190 U/L (ref 13–60)
LYMPHOCYTES # BLD AUTO: 3.9 10*3/MM3 (ref 0.7–3.1)
LYMPHOCYTES NFR BLD AUTO: 37.5 % (ref 19.6–45.3)
MAGNESIUM SERPL-MCNC: 2.2 MG/DL (ref 1.6–2.6)
MCH RBC QN AUTO: 27.7 PG (ref 26.6–33)
MCHC RBC AUTO-ENTMCNC: 33 G/DL (ref 31.5–35.7)
MCV RBC AUTO: 84.2 FL (ref 79–97)
MONOCYTES # BLD AUTO: 0.5 10*3/MM3 (ref 0.1–0.9)
MONOCYTES NFR BLD AUTO: 5.1 % (ref 5–12)
NEUTROPHILS # BLD AUTO: 5.8 10*3/MM3 (ref 1.7–7)
NEUTROPHILS NFR BLD AUTO: 56.6 % (ref 42.7–76)
NITRITE UR QL STRIP: NEGATIVE
NRBC BLD AUTO-RTO: 0.1 /100 WBC (ref 0–0.2)
PH UR STRIP.AUTO: 6.5 [PH] (ref 5–8)
PLATELET # BLD AUTO: 280 10*3/MM3 (ref 140–450)
PMV BLD AUTO: 8.8 FL (ref 6–12)
POTASSIUM BLD-SCNC: 2.5 MMOL/L (ref 3.5–5.2)
PROT SERPL-MCNC: 6.6 G/DL (ref 6–8.5)
PROT UR QL STRIP: ABNORMAL
RBC # BLD AUTO: 4.09 10*6/MM3 (ref 3.77–5.28)
RBC # UR: ABNORMAL /HPF
REF LAB TEST METHOD: ABNORMAL
SODIUM BLD-SCNC: 136 MMOL/L (ref 136–145)
SP GR UR STRIP: 1.01 (ref 1–1.03)
SQUAMOUS #/AREA URNS HPF: ABNORMAL /HPF
TROPONIN T SERPL-MCNC: <0.01 NG/ML (ref 0–0.03)
TSH SERPL DL<=0.05 MIU/L-ACNC: 1.51 UIU/ML (ref 0.27–4.2)
UROBILINOGEN UR QL STRIP: ABNORMAL
WBC NRBC COR # BLD: 10.3 10*3/MM3 (ref 3.4–10.8)
WBC UR QL AUTO: ABNORMAL /HPF

## 2020-02-24 PROCEDURE — 82550 ASSAY OF CK (CPK): CPT | Performed by: EMERGENCY MEDICINE

## 2020-02-24 PROCEDURE — 87086 URINE CULTURE/COLONY COUNT: CPT | Performed by: EMERGENCY MEDICINE

## 2020-02-24 PROCEDURE — 81001 URINALYSIS AUTO W/SCOPE: CPT | Performed by: EMERGENCY MEDICINE

## 2020-02-24 PROCEDURE — 71045 X-RAY EXAM CHEST 1 VIEW: CPT

## 2020-02-24 PROCEDURE — 85025 COMPLETE CBC W/AUTO DIFF WBC: CPT | Performed by: EMERGENCY MEDICINE

## 2020-02-24 PROCEDURE — 83690 ASSAY OF LIPASE: CPT | Performed by: EMERGENCY MEDICINE

## 2020-02-24 PROCEDURE — 0 IOPAMIDOL PER 1 ML: Performed by: EMERGENCY MEDICINE

## 2020-02-24 PROCEDURE — 36415 COLL VENOUS BLD VENIPUNCTURE: CPT

## 2020-02-24 PROCEDURE — 74177 CT ABD & PELVIS W/CONTRAST: CPT

## 2020-02-24 PROCEDURE — 84484 ASSAY OF TROPONIN QUANT: CPT | Performed by: EMERGENCY MEDICINE

## 2020-02-24 PROCEDURE — 83735 ASSAY OF MAGNESIUM: CPT | Performed by: EMERGENCY MEDICINE

## 2020-02-24 PROCEDURE — 80053 COMPREHEN METABOLIC PANEL: CPT | Performed by: EMERGENCY MEDICINE

## 2020-02-24 PROCEDURE — 84443 ASSAY THYROID STIM HORMONE: CPT | Performed by: EMERGENCY MEDICINE

## 2020-02-24 PROCEDURE — 99283 EMERGENCY DEPT VISIT LOW MDM: CPT

## 2020-02-24 PROCEDURE — 93005 ELECTROCARDIOGRAM TRACING: CPT | Performed by: EMERGENCY MEDICINE

## 2020-02-24 RX ORDER — POTASSIUM CHLORIDE 20 MEQ/1
40 TABLET, EXTENDED RELEASE ORAL ONCE
Status: COMPLETED | OUTPATIENT
Start: 2020-02-24 | End: 2020-02-24

## 2020-02-24 RX ORDER — SORBITOL SOLUTION 70 %
60 SOLUTION, ORAL MISCELLANEOUS DAILY PRN
Qty: 120 ML | Refills: 0 | Status: SHIPPED | OUTPATIENT
Start: 2020-02-24 | End: 2020-03-02

## 2020-02-24 RX ORDER — MORPHINE SULFATE 4 MG/ML
2 INJECTION, SOLUTION INTRAMUSCULAR; INTRAVENOUS ONCE
Status: DISCONTINUED | OUTPATIENT
Start: 2020-02-24 | End: 2020-02-24 | Stop reason: HOSPADM

## 2020-02-24 RX ORDER — POTASSIUM CHLORIDE 20 MEQ/1
20 TABLET, EXTENDED RELEASE ORAL 2 TIMES DAILY
Qty: 10 TABLET | Refills: 0 | Status: SHIPPED | OUTPATIENT
Start: 2020-02-24 | End: 2020-03-02

## 2020-02-24 RX ORDER — SODIUM CHLORIDE 0.9 % (FLUSH) 0.9 %
10 SYRINGE (ML) INJECTION AS NEEDED
Status: DISCONTINUED | OUTPATIENT
Start: 2020-02-24 | End: 2020-02-24 | Stop reason: HOSPADM

## 2020-02-24 RX ADMIN — POTASSIUM CHLORIDE 40 MEQ: 1500 TABLET, EXTENDED RELEASE ORAL at 17:19

## 2020-02-24 RX ADMIN — SODIUM CHLORIDE 1000 ML: 900 INJECTION, SOLUTION INTRAVENOUS at 13:15

## 2020-02-24 RX ADMIN — IOPAMIDOL 70 ML: 755 INJECTION, SOLUTION INTRAVENOUS at 15:43

## 2020-02-24 NOTE — DISCHARGE INSTRUCTIONS
Increase fluid and fiber.  Potassium sorbitol.  Follow-up with your primary care doctor in the next 2 days have your potassium rechecked.  Your chemistries need to be rechecked as her CO2 is 11.  Return for changing your mind about staying increasing pain dizziness passing out fevers or any other new or worse problems or concerns.  Follow-up with the GI doctor as scheduled

## 2020-02-24 NOTE — ED PROVIDER NOTES
Subjective   Chief complaint abdominal pain constipation chest pain shortness of breath weakness difficulty urinating    History of present illness 44-year-old female complains of a 3-week history of some constipation generalized abdominal pain occasional tightness in her chest but no neck arm or jaw pain some mild shortness of breath.  Nothing really makes it better or worse it comes and goes and is been off and on for last 3 weeks.  No dizziness or syncope no black or bloody stool.  No urinary problems as far as dysuria frequency but trouble going to the bathroom.  Denies any pregnancy concerns no headache vision change speech difficulty or paralysis.  She has had a significant bout of weight loss over the last 3 months.  The patient is scheduled for a GI appointment in a couple weeks.  She has no current pain in her chest currently.  No recent long car ride plane or immobilization no leg pain or swelling or foreign travels or recent antibiotic use or recent hospitalizations.          Review of Systems   Constitutional: Positive for appetite change and fatigue. Negative for chills and fever.   HENT: Negative for congestion and sinus pressure.    Eyes: Negative for photophobia and visual disturbance.   Respiratory: Positive for chest tightness and shortness of breath.    Cardiovascular: Positive for chest pain. Negative for leg swelling.   Gastrointestinal: Positive for abdominal pain and constipation. Negative for blood in stool and vomiting.   Endocrine: Negative for cold intolerance and heat intolerance.   Genitourinary: Positive for difficulty urinating. Negative for dysuria.   Musculoskeletal: Negative for arthralgias and back pain.   Skin: Negative for color change and pallor.   Neurological: Negative for dizziness and light-headedness.   Psychiatric/Behavioral: Negative for agitation and behavioral problems.       Past Medical History:   Diagnosis Date   • ADHD    • Anxiety    • Bipolar 1 disorder (CMS/[facility])   "  • Depression    • PTSD (post-traumatic stress disorder)    • Substance abuse (CMS/[facility])    • Vitamin D deficiency        Allergies   Allergen Reactions   • Erythromycin Base Hives   • Lithium GI Intolerance     States lithium caused \"all of her organs to shut down\"       Past Surgical History:   Procedure Laterality Date   •  SECTION     • DILATATION AND CURETTAGE      x2   • TONSILLECTOMY         History reviewed. No pertinent family history.    Social History     Socioeconomic History   • Marital status: Single     Spouse name: Not on file   • Number of children: Not on file   • Years of education: Not on file   • Highest education level: Not on file   Tobacco Use   • Smoking status: Current Every Day Smoker     Packs/day: 1.00     Types: Cigarettes   Substance and Sexual Activity   • Alcohol use: No     Frequency: Never   • Drug use: No   • Sexual activity: Defer     Prior to Admission medications    Medication Sig Start Date End Date Taking? Authorizing Provider   albuterol sulfate  (90 Base) MCG/ACT inhaler Inhale 2 puffs Every 6 (Six) Hours As Needed for Wheezing or Shortness of Air.    Laura Garcia MD   buprenorphine-naloxone (SUBOXONE) 8-2 MG film film Place 2 films under the tongue Daily. 19   Gemma Hyatt MD   docusate sodium (COLACE) 100 MG capsule Take 100 mg by mouth 2 (Two) Times a Day.    Laura Garcia MD   fluticasone (FLONASE) 50 MCG/ACT nasal spray 2 sprays into the nostril(s) as directed by provider Daily.    Laura Garcia MD   gabapentin (NEURONTIN) 100 MG capsule Take 1 capsule by mouth Every 8 (Eight) Hours. 19   Gemma Hyatt MD   lamoTRIgine (LAMICTAL) 100 MG tablet Take 200 mg by mouth every night at bedtime. 18   Laura Garcia MD   lithium carbonate 300 MG capsule Take 300 mg by mouth Daily.    Laura Garcia MD   OLANZapine (zyPREXA) 10 MG tablet Take 2 tablets by mouth Every Night. 19   Gemma Hyatt MD "   polyethylene glycol (MIRALAX) packet Take 17 g by mouth Daily As Needed (constipation).    Provider, MD Laura   umeclidinium-vilanterol (ANORO ELLIPTA) 62.5-25 MCG/INH aerosol powder  inhaler Inhale 1 puff Daily.    Provider, MD Laura   venlafaxine XR (EFFEXOR-XR) 150 MG 24 hr capsule Take 225 mg by mouth Daily.    Provider, MD Laura           Objective   Physical Exam  44-year-old female awake alert no acute distress HEENT extraocular muscles intact pupils equal react no photophobia no nystagmus and disc are sharp mouth is clear neck is supple no adenopathy no JVD no bruits lungs clear no retractions or accessory heart rate without murmur abdomen soft with some tenderness right upper quadrant but no rebound no guarding good bowel sounds no peritoneal findings or masses extremities pulses are equal throughout upper and lower extremities no edema cords or Homans sign or evidence of DVT.  Patient's awake alert orientated x4 no facial asymmetry normal speech no drift no focal weakness  Procedures           ED Course      Results for orders placed or performed during the hospital encounter of 02/24/20   Comprehensive Metabolic Panel   Result Value Ref Range    Glucose 94 65 - 99 mg/dL    BUN 19 6 - 20 mg/dL    Creatinine 1.20 (H) 0.57 - 1.00 mg/dL    Sodium 136 136 - 145 mmol/L    Potassium 2.5 (C) 3.5 - 5.2 mmol/L    Chloride 115 (H) 98 - 107 mmol/L    CO2 11.0 (L) 22.0 - 29.0 mmol/L    Calcium 10.2 8.6 - 10.5 mg/dL    Total Protein 6.6 6.0 - 8.5 g/dL    Albumin 4.00 3.50 - 5.20 g/dL    ALT (SGPT) 23 1 - 33 U/L    AST (SGOT) 17 1 - 32 U/L    Alkaline Phosphatase 104 39 - 117 U/L    Total Bilirubin 0.3 0.2 - 1.2 mg/dL    eGFR Non African Amer 49 (L) >60 mL/min/1.73    Globulin 2.6 gm/dL    A/G Ratio 1.5 g/dL    BUN/Creatinine Ratio 15.8 7.0 - 25.0    Anion Gap 10.0 5.0 - 15.0 mmol/L   Urinalysis With Culture If Indicated - Urine, Clean Catch   Result Value Ref Range    Color, UA Yellow Yellow, Straw     Appearance, UA Cloudy (A) Clear    pH, UA 6.5 5.0 - 8.0    Specific Gravity, UA 1.015 1.005 - 1.030    Glucose, UA Negative Negative    Ketones, UA Trace (A) Negative    Bilirubin, UA Negative Negative    Blood, UA Small (1+) (A) Negative    Protein,  mg/dL (2+) (A) Negative    Leuk Esterase, UA Trace (A) Negative    Nitrite, UA Negative Negative    Urobilinogen, UA 1.0 E.U./dL 0.2 - 1.0 E.U./dL   Troponin   Result Value Ref Range    Troponin T <0.010 0.000 - 0.030 ng/mL   TSH   Result Value Ref Range    TSH 1.510 0.270 - 4.200 uIU/mL   Magnesium   Result Value Ref Range    Magnesium 2.2 1.6 - 2.6 mg/dL   CK   Result Value Ref Range    Creatine Kinase 137 20 - 180 U/L   Lipase   Result Value Ref Range    Lipase 190 (H) 13 - 60 U/L   CBC Auto Differential   Result Value Ref Range    WBC 10.30 3.40 - 10.80 10*3/mm3    RBC 4.09 3.77 - 5.28 10*6/mm3    Hemoglobin 11.4 (L) 12.0 - 15.9 g/dL    Hematocrit 34.4 34.0 - 46.6 %    MCV 84.2 79.0 - 97.0 fL    MCH 27.7 26.6 - 33.0 pg    MCHC 33.0 31.5 - 35.7 g/dL    RDW 16.4 (H) 12.3 - 15.4 %    RDW-SD 49.0 37.0 - 54.0 fl    MPV 8.8 6.0 - 12.0 fL    Platelets 280 140 - 450 10*3/mm3    Neutrophil % 56.6 42.7 - 76.0 %    Lymphocyte % 37.5 19.6 - 45.3 %    Monocyte % 5.1 5.0 - 12.0 %    Eosinophil % 0.1 (L) 0.3 - 6.2 %    Basophil % 0.7 0.0 - 1.5 %    Neutrophils, Absolute 5.80 1.70 - 7.00 10*3/mm3    Lymphocytes, Absolute 3.90 (H) 0.70 - 3.10 10*3/mm3    Monocytes, Absolute 0.50 0.10 - 0.90 10*3/mm3    Eosinophils, Absolute 0.00 0.00 - 0.40 10*3/mm3    Basophils, Absolute 0.10 0.00 - 0.20 10*3/mm3    nRBC 0.1 0.0 - 0.2 /100 WBC   Urinalysis, Microscopic Only - Urine, Clean Catch   Result Value Ref Range    RBC, UA 0-2 (A) None Seen /HPF    WBC, UA 21-30 (A) None Seen /HPF    Bacteria, UA Trace (A) None Seen /HPF    Squamous Epithelial Cells, UA 3-6 (A) None Seen, 0-2 /HPF    Hyaline Casts, UA 3-6 None Seen /LPF    Methodology Automated Microscopy      Ct Abdomen Pelvis  With Contrast    Result Date: 2/24/2020   1. No evidence of appendicitis. 2. Massive colonic fecal retention, constipation. 3. Intra and extrahepatic biliary ductal dilatation, may be due to postcholecystectomy change. This was noted on recent ultrasound exam from January 2020 as well. 4. Additional findings as given above.    Electronically Signed By-Patrice Foreman On:2/24/2020 3:50 PM This report was finalized on [id number] by  Patrice Foreman, .    Xr Chest 1 View    Result Date: 2/24/2020  No radiographic findings of acute cardiopulmonary abnormality.  Electronically Signed By-DR. Vicente Mendoza MD On:2/24/2020 1:35 PM This report was finalized on [id number] by DR. Vicente Mendoza MD.    Medications   sodium chloride 0.9 % flush 10 mL (has no administration in time range)   Morphine sulfate (PF) injection 2 mg (2 mg Intravenous Not Given 2/24/20 1555)   potassium chloride (K-DUR,KLOR-CON) CR tablet 40 mEq (has no administration in time range)   sodium chloride 0.9 % bolus 1,000 mL (0 mL Intravenous Stopped 2/24/20 1557)   iopamidol (ISOVUE-370) 76 % injection 100 mL (70 mL Intravenous Given 2/24/20 1543)          EKG my interpretation normal sinus rhythm rate of 89 normal axis no hypertrophy patient has some nonspecific T wave changes noted anterior leads QTC of 511 abnormal EKG and unchanged from previous                                  MDM  Number of Diagnoses or Management Options  Chest pain, unspecified type:   Generalized abdominal pain:   Hypokalemia:   Weakness:   Diagnosis management comments: Medical decision making.  Patient had IV established placed on monitor given liter of saline and had the above exam evaluation.  CBC was unremarkable hemoglobin 11.4 chemistries unremarkable other than a BUN 19 creatinine 1.2 potassium was 2.5 CO2 was 11 magnesium was 2.2 CK was normal lipase 190 CT abdomen pelvis shows significant constipation she was noted to have some moderate stenosis of the celiac trunk other  vessels look okay.  Patient was given potassium 40 mEq p.o.  On repeat exam she was resting comfortably troponin was negative at this point she had no dysrhythmia she has had no further pain I see no evidence of suggest acute DVT pulmonary embolism or aortic dissection.  No evidence of acute stroke or sepsis.  At this point is recommend she be admitted to the hospital for the above abnormalities mentioned.  She will need further work-up.  She is had weight loss she is got significant lab abnormalities CO2 this decreased she is got low potassium but patient absolutely refused.  Talked about the potential risk and complications.  She voiced understanding she was invited to come back if she changes her mind we stressed importance of follow-up and she will be discharged home for outpatient follow-up      Final diagnoses:   Chest pain, unspecified type   Weakness   Generalized abdominal pain   Hypokalemia            Elieser Patterson MD  02/24/20 1972

## 2020-02-24 NOTE — ED NOTES
Pt/familyl refused Morphine and states pt is on Suboxone and does not want any Narcotics. MD notified pt requesting Toradol. MD reviewing chart. No new orders at this time.      Prior, WISAM Perez  02/24/20 0533

## 2020-02-26 LAB — BACTERIA SPEC AEROBE CULT: NO GROWTH

## 2020-03-02 ENCOUNTER — HOSPITAL ENCOUNTER (INPATIENT)
Facility: HOSPITAL | Age: 45
LOS: 4 days | Discharge: HOME | End: 2020-03-06
Attending: INTERNAL MEDICINE | Admitting: INTERNAL MEDICINE

## 2020-03-02 ENCOUNTER — APPOINTMENT (OUTPATIENT)
Dept: CT IMAGING | Facility: HOSPITAL | Age: 45
End: 2020-03-02

## 2020-03-02 ENCOUNTER — APPOINTMENT (OUTPATIENT)
Dept: GENERAL RADIOLOGY | Facility: HOSPITAL | Age: 45
End: 2020-03-02

## 2020-03-02 DIAGNOSIS — E87.6 HYPOKALEMIA: Primary | ICD-10-CM

## 2020-03-02 DIAGNOSIS — R07.9 CHEST PAIN, UNSPECIFIED TYPE: ICD-10-CM

## 2020-03-02 DIAGNOSIS — D64.9 ANEMIA, UNSPECIFIED TYPE: ICD-10-CM

## 2020-03-02 DIAGNOSIS — K59.00 CONSTIPATION, UNSPECIFIED CONSTIPATION TYPE: ICD-10-CM

## 2020-03-02 DIAGNOSIS — R10.9 ABDOMINAL PAIN, UNSPECIFIED ABDOMINAL LOCATION: ICD-10-CM

## 2020-03-02 PROBLEM — B18.2 HEP C W/O COMA, CHRONIC (HCC): Chronic | Status: ACTIVE | Noted: 2020-03-02

## 2020-03-02 PROBLEM — E43 SEVERE MALNUTRITION: Chronic | Status: ACTIVE | Noted: 2020-03-02

## 2020-03-02 PROBLEM — F43.10 PTSD (POST-TRAUMATIC STRESS DISORDER): Chronic | Status: ACTIVE | Noted: 2020-03-02

## 2020-03-02 LAB
ALBUMIN SERPL-MCNC: 4.1 G/DL (ref 3.5–5.2)
ALBUMIN/GLOB SERPL: 1.6 G/DL
ALP SERPL-CCNC: 114 U/L (ref 39–117)
ALT SERPL W P-5'-P-CCNC: 29 U/L (ref 1–33)
AMPHET+METHAMPHET UR QL: NEGATIVE
ANION GAP SERPL CALCULATED.3IONS-SCNC: 11 MMOL/L (ref 5–15)
AST SERPL-CCNC: 19 U/L (ref 1–32)
BACTERIA UR QL AUTO: ABNORMAL /HPF
BARBITURATES UR QL SCN: NEGATIVE
BASOPHILS # BLD AUTO: 0 10*3/MM3 (ref 0–0.2)
BASOPHILS NFR BLD AUTO: 0.5 % (ref 0–1.5)
BENZODIAZ UR QL SCN: NEGATIVE
BILIRUB SERPL-MCNC: 0.2 MG/DL (ref 0.2–1.2)
BILIRUB UR QL STRIP: NEGATIVE
BUN BLD-MCNC: 16 MG/DL (ref 6–20)
BUN/CREAT SERPL: 14 (ref 7–25)
CA-I SERPL ISE-MCNC: 1.34 MMOL/L (ref 1.2–1.3)
CALCIUM SPEC-SCNC: 10.1 MG/DL (ref 8.6–10.5)
CANNABINOIDS SERPL QL: NEGATIVE
CHLORIDE SERPL-SCNC: 114 MMOL/L (ref 98–107)
CLARITY UR: CLEAR
CO2 SERPL-SCNC: 14 MMOL/L (ref 22–29)
COCAINE UR QL: NEGATIVE
COLOR UR: YELLOW
CREAT BLD-MCNC: 1.14 MG/DL (ref 0.57–1)
DEPRECATED RDW RBC AUTO: 46.4 FL (ref 37–54)
EOSINOPHIL # BLD AUTO: 0 10*3/MM3 (ref 0–0.4)
EOSINOPHIL NFR BLD AUTO: 0.5 % (ref 0.3–6.2)
ERYTHROCYTE [DISTWIDTH] IN BLOOD BY AUTOMATED COUNT: 15.9 % (ref 12.3–15.4)
GFR SERPL CREATININE-BSD FRML MDRD: 52 ML/MIN/1.73
GLOBULIN UR ELPH-MCNC: 2.5 GM/DL
GLUCOSE BLD-MCNC: 141 MG/DL (ref 65–99)
GLUCOSE UR STRIP-MCNC: NEGATIVE MG/DL
HCT VFR BLD AUTO: 31.4 % (ref 34–46.6)
HGB BLD-MCNC: 10.4 G/DL (ref 12–15.9)
HGB UR QL STRIP.AUTO: NEGATIVE
HYALINE CASTS UR QL AUTO: ABNORMAL /LPF
KETONES UR QL STRIP: NEGATIVE
LEUKOCYTE ESTERASE UR QL STRIP.AUTO: ABNORMAL
LYMPHOCYTES # BLD AUTO: 3.3 10*3/MM3 (ref 0.7–3.1)
LYMPHOCYTES NFR BLD AUTO: 36.3 % (ref 19.6–45.3)
MAGNESIUM SERPL-MCNC: 2.1 MG/DL (ref 1.6–2.6)
MAGNESIUM SERPL-MCNC: 2.2 MG/DL (ref 1.6–2.6)
MCH RBC QN AUTO: 27.9 PG (ref 26.6–33)
MCHC RBC AUTO-ENTMCNC: 33.2 G/DL (ref 31.5–35.7)
MCV RBC AUTO: 84.1 FL (ref 79–97)
METHADONE UR QL SCN: NEGATIVE
MONOCYTES # BLD AUTO: 0.5 10*3/MM3 (ref 0.1–0.9)
MONOCYTES NFR BLD AUTO: 6 % (ref 5–12)
NEUTROPHILS # BLD AUTO: 5.2 10*3/MM3 (ref 1.7–7)
NEUTROPHILS NFR BLD AUTO: 56.7 % (ref 42.7–76)
NITRITE UR QL STRIP: NEGATIVE
NRBC BLD AUTO-RTO: 0.1 /100 WBC (ref 0–0.2)
OPIATES UR QL: NEGATIVE
OXYCODONE UR QL SCN: NEGATIVE
PH UR STRIP.AUTO: 6.5 [PH] (ref 5–8)
PLATELET # BLD AUTO: 301 10*3/MM3 (ref 140–450)
PMV BLD AUTO: 8.6 FL (ref 6–12)
POTASSIUM BLD-SCNC: 2.4 MMOL/L (ref 3.5–5.2)
POTASSIUM BLD-SCNC: 2.6 MMOL/L (ref 3.5–5.2)
PROT SERPL-MCNC: 6.6 G/DL (ref 6–8.5)
PROT UR QL STRIP: ABNORMAL
RBC # BLD AUTO: 3.73 10*6/MM3 (ref 3.77–5.28)
RBC # UR: ABNORMAL /HPF
REF LAB TEST METHOD: ABNORMAL
SODIUM BLD-SCNC: 139 MMOL/L (ref 136–145)
SP GR UR STRIP: 1.02 (ref 1–1.03)
SQUAMOUS #/AREA URNS HPF: ABNORMAL /HPF
TROPONIN T SERPL-MCNC: <0.01 NG/ML (ref 0–0.03)
TROPONIN T SERPL-MCNC: <0.01 NG/ML (ref 0–0.03)
UROBILINOGEN UR QL STRIP: ABNORMAL
WBC NRBC COR # BLD: 9.2 10*3/MM3 (ref 3.4–10.8)
WBC UR QL AUTO: ABNORMAL /HPF

## 2020-03-02 PROCEDURE — 84132 ASSAY OF SERUM POTASSIUM: CPT | Performed by: NURSE PRACTITIONER

## 2020-03-02 PROCEDURE — 84484 ASSAY OF TROPONIN QUANT: CPT | Performed by: NURSE PRACTITIONER

## 2020-03-02 PROCEDURE — 71045 X-RAY EXAM CHEST 1 VIEW: CPT

## 2020-03-02 PROCEDURE — 80307 DRUG TEST PRSMV CHEM ANLYZR: CPT | Performed by: NURSE PRACTITIONER

## 2020-03-02 PROCEDURE — 82330 ASSAY OF CALCIUM: CPT | Performed by: NURSE PRACTITIONER

## 2020-03-02 PROCEDURE — 83735 ASSAY OF MAGNESIUM: CPT | Performed by: NURSE PRACTITIONER

## 2020-03-02 PROCEDURE — 81001 URINALYSIS AUTO W/SCOPE: CPT | Performed by: NURSE PRACTITIONER

## 2020-03-02 PROCEDURE — 93005 ELECTROCARDIOGRAM TRACING: CPT

## 2020-03-02 PROCEDURE — 93005 ELECTROCARDIOGRAM TRACING: CPT | Performed by: INTERNAL MEDICINE

## 2020-03-02 PROCEDURE — 80053 COMPREHEN METABOLIC PANEL: CPT | Performed by: NURSE PRACTITIONER

## 2020-03-02 PROCEDURE — 74176 CT ABD & PELVIS W/O CONTRAST: CPT

## 2020-03-02 PROCEDURE — 99285 EMERGENCY DEPT VISIT HI MDM: CPT

## 2020-03-02 PROCEDURE — 25010000002 ONDANSETRON PER 1 MG: Performed by: NURSE PRACTITIONER

## 2020-03-02 PROCEDURE — 85025 COMPLETE CBC W/AUTO DIFF WBC: CPT | Performed by: NURSE PRACTITIONER

## 2020-03-02 PROCEDURE — 99223 1ST HOSP IP/OBS HIGH 75: CPT | Performed by: INTERNAL MEDICINE

## 2020-03-02 RX ORDER — OLANZAPINE 10 MG/1
40 TABLET ORAL NIGHTLY
COMMUNITY

## 2020-03-02 RX ORDER — FLUTICASONE PROPIONATE 50 MCG
2 SPRAY, SUSPENSION (ML) NASAL DAILY
Status: DISCONTINUED | OUTPATIENT
Start: 2020-03-03 | End: 2020-03-06 | Stop reason: HOSPADM

## 2020-03-02 RX ORDER — IPRATROPIUM BROMIDE AND ALBUTEROL SULFATE 2.5; .5 MG/3ML; MG/3ML
3 SOLUTION RESPIRATORY (INHALATION)
Status: DISCONTINUED | OUTPATIENT
Start: 2020-03-03 | End: 2020-03-06 | Stop reason: HOSPADM

## 2020-03-02 RX ORDER — SODIUM CHLORIDE 0.9 % (FLUSH) 0.9 %
10 SYRINGE (ML) INJECTION EVERY 12 HOURS SCHEDULED
Status: DISCONTINUED | OUTPATIENT
Start: 2020-03-02 | End: 2020-03-06 | Stop reason: HOSPADM

## 2020-03-02 RX ORDER — ALUMINA, MAGNESIA, AND SIMETHICONE 2400; 2400; 240 MG/30ML; MG/30ML; MG/30ML
15 SUSPENSION ORAL EVERY 6 HOURS PRN
Status: DISCONTINUED | OUTPATIENT
Start: 2020-03-02 | End: 2020-03-06 | Stop reason: HOSPADM

## 2020-03-02 RX ORDER — BUSPIRONE HYDROCHLORIDE 15 MG/1
30 TABLET ORAL EVERY 12 HOURS SCHEDULED
Status: DISCONTINUED | OUTPATIENT
Start: 2020-03-03 | End: 2020-03-06 | Stop reason: HOSPADM

## 2020-03-02 RX ORDER — ONDANSETRON 2 MG/ML
4 INJECTION INTRAMUSCULAR; INTRAVENOUS EVERY 6 HOURS PRN
Status: DISCONTINUED | OUTPATIENT
Start: 2020-03-02 | End: 2020-03-06 | Stop reason: HOSPADM

## 2020-03-02 RX ORDER — BUPRENORPHINE HYDROCHLORIDE AND NALOXONE HYDROCHLORIDE DIHYDRATE 8; 2 MG/1; MG/1
1 TABLET SUBLINGUAL 3 TIMES DAILY
COMMUNITY

## 2020-03-02 RX ORDER — LAMOTRIGINE 100 MG/1
200 TABLET ORAL NIGHTLY
Status: DISCONTINUED | OUTPATIENT
Start: 2020-03-03 | End: 2020-03-06 | Stop reason: HOSPADM

## 2020-03-02 RX ORDER — POLYETHYLENE GLYCOL 3350 17 G/17G
17 POWDER, FOR SOLUTION ORAL DAILY PRN
Status: DISCONTINUED | OUTPATIENT
Start: 2020-03-02 | End: 2020-03-03

## 2020-03-02 RX ORDER — ACETAMINOPHEN 650 MG/1
650 SUPPOSITORY RECTAL EVERY 4 HOURS PRN
Status: DISCONTINUED | OUTPATIENT
Start: 2020-03-02 | End: 2020-03-06 | Stop reason: HOSPADM

## 2020-03-02 RX ORDER — CALCIUM GLUCONATE 20 MG/ML
2 INJECTION, SOLUTION INTRAVENOUS AS NEEDED
Status: DISCONTINUED | OUTPATIENT
Start: 2020-03-02 | End: 2020-03-06 | Stop reason: HOSPADM

## 2020-03-02 RX ORDER — ACETAMINOPHEN 325 MG/1
650 TABLET ORAL EVERY 4 HOURS PRN
Status: DISCONTINUED | OUTPATIENT
Start: 2020-03-02 | End: 2020-03-06 | Stop reason: HOSPADM

## 2020-03-02 RX ORDER — CHOLECALCIFEROL (VITAMIN D3) 125 MCG
5 CAPSULE ORAL NIGHTLY PRN
Status: DISCONTINUED | OUTPATIENT
Start: 2020-03-02 | End: 2020-03-06 | Stop reason: HOSPADM

## 2020-03-02 RX ORDER — BISACODYL 10 MG
10 SUPPOSITORY, RECTAL RECTAL DAILY PRN
Status: DISCONTINUED | OUTPATIENT
Start: 2020-03-02 | End: 2020-03-06 | Stop reason: HOSPADM

## 2020-03-02 RX ORDER — SODIUM CHLORIDE 0.9 % (FLUSH) 0.9 %
10 SYRINGE (ML) INJECTION AS NEEDED
Status: DISCONTINUED | OUTPATIENT
Start: 2020-03-02 | End: 2020-03-06 | Stop reason: HOSPADM

## 2020-03-02 RX ORDER — SODIUM CHLORIDE 9 MG/ML
75 INJECTION, SOLUTION INTRAVENOUS CONTINUOUS
Status: DISCONTINUED | OUTPATIENT
Start: 2020-03-02 | End: 2020-03-02

## 2020-03-02 RX ORDER — RAMELTEON 8 MG/1
8 TABLET ORAL NIGHTLY
COMMUNITY

## 2020-03-02 RX ORDER — MAGNESIUM CARB/ALUMINUM HYDROX 105-160MG
150 TABLET,CHEWABLE ORAL ONCE
Status: COMPLETED | OUTPATIENT
Start: 2020-03-02 | End: 2020-03-02

## 2020-03-02 RX ORDER — POTASSIUM CHLORIDE 1.5 G/1.77G
40 POWDER, FOR SOLUTION ORAL AS NEEDED
Status: DISCONTINUED | OUTPATIENT
Start: 2020-03-02 | End: 2020-03-03

## 2020-03-02 RX ORDER — POTASSIUM CHLORIDE 20 MEQ/1
40 TABLET, EXTENDED RELEASE ORAL AS NEEDED
Status: DISCONTINUED | OUTPATIENT
Start: 2020-03-02 | End: 2020-03-03

## 2020-03-02 RX ORDER — DOCUSATE SODIUM 100 MG/1
100 CAPSULE, LIQUID FILLED ORAL 2 TIMES DAILY
Status: DISCONTINUED | OUTPATIENT
Start: 2020-03-03 | End: 2020-03-06 | Stop reason: HOSPADM

## 2020-03-02 RX ORDER — ZOLPIDEM TARTRATE 5 MG/1
5 TABLET ORAL NIGHTLY PRN
Status: DISCONTINUED | OUTPATIENT
Start: 2020-03-02 | End: 2020-03-06 | Stop reason: HOSPADM

## 2020-03-02 RX ORDER — POTASSIUM CHLORIDE 7.45 MG/ML
10 INJECTION INTRAVENOUS
Status: DISCONTINUED | OUTPATIENT
Start: 2020-03-02 | End: 2020-03-03

## 2020-03-02 RX ORDER — SODIUM CHLORIDE AND POTASSIUM CHLORIDE 150; 900 MG/100ML; MG/100ML
75 INJECTION, SOLUTION INTRAVENOUS CONTINUOUS
Status: DISCONTINUED | OUTPATIENT
Start: 2020-03-02 | End: 2020-03-03

## 2020-03-02 RX ORDER — ACETAMINOPHEN 160 MG/5ML
650 SOLUTION ORAL EVERY 4 HOURS PRN
Status: DISCONTINUED | OUTPATIENT
Start: 2020-03-02 | End: 2020-03-06 | Stop reason: HOSPADM

## 2020-03-02 RX ORDER — CALCIUM GLUCONATE 20 MG/ML
1 INJECTION, SOLUTION INTRAVENOUS AS NEEDED
Status: DISCONTINUED | OUTPATIENT
Start: 2020-03-02 | End: 2020-03-06 | Stop reason: HOSPADM

## 2020-03-02 RX ORDER — BISACODYL 5 MG/1
5 TABLET, DELAYED RELEASE ORAL DAILY PRN
Status: DISCONTINUED | OUTPATIENT
Start: 2020-03-02 | End: 2020-03-06 | Stop reason: HOSPADM

## 2020-03-02 RX ORDER — VENLAFAXINE HYDROCHLORIDE 75 MG/1
150 CAPSULE, EXTENDED RELEASE ORAL DAILY
Status: DISCONTINUED | OUTPATIENT
Start: 2020-03-03 | End: 2020-03-06 | Stop reason: HOSPADM

## 2020-03-02 RX ORDER — BUPRENORPHINE HYDROCHLORIDE AND NALOXONE HYDROCHLORIDE DIHYDRATE 8; 2 MG/1; MG/1
0.25 TABLET SUBLINGUAL 4 TIMES DAILY
Status: DISCONTINUED | OUTPATIENT
Start: 2020-03-02 | End: 2020-03-03

## 2020-03-02 RX ORDER — MAGNESIUM SULFATE HEPTAHYDRATE 40 MG/ML
2 INJECTION, SOLUTION INTRAVENOUS AS NEEDED
Status: DISCONTINUED | OUTPATIENT
Start: 2020-03-02 | End: 2020-03-06 | Stop reason: HOSPADM

## 2020-03-02 RX ORDER — BUSPIRONE HYDROCHLORIDE 30 MG/1
30 TABLET ORAL 2 TIMES DAILY
COMMUNITY

## 2020-03-02 RX ORDER — MAGNESIUM SULFATE 1 G/100ML
1 INJECTION INTRAVENOUS AS NEEDED
Status: DISCONTINUED | OUTPATIENT
Start: 2020-03-02 | End: 2020-03-06 | Stop reason: HOSPADM

## 2020-03-02 RX ORDER — OLANZAPINE 10 MG/1
20 TABLET ORAL NIGHTLY
Status: DISCONTINUED | OUTPATIENT
Start: 2020-03-03 | End: 2020-03-06 | Stop reason: HOSPADM

## 2020-03-02 RX ORDER — ONDANSETRON 4 MG/1
4 TABLET, FILM COATED ORAL EVERY 6 HOURS PRN
Status: DISCONTINUED | OUTPATIENT
Start: 2020-03-02 | End: 2020-03-06 | Stop reason: HOSPADM

## 2020-03-02 RX ADMIN — Medication 150 ML: at 16:41

## 2020-03-02 RX ADMIN — SODIUM CHLORIDE 75 ML/HR: 900 INJECTION, SOLUTION INTRAVENOUS at 16:55

## 2020-03-02 RX ADMIN — SODIUM CHLORIDE 500 ML: 900 INJECTION, SOLUTION INTRAVENOUS at 14:35

## 2020-03-02 RX ADMIN — POTASSIUM CHLORIDE 40 MEQ: 1500 TABLET, EXTENDED RELEASE ORAL at 15:49

## 2020-03-02 RX ADMIN — ONDANSETRON 4 MG: 2 INJECTION INTRAMUSCULAR; INTRAVENOUS at 18:29

## 2020-03-02 RX ADMIN — Medication 10 ML: at 21:30

## 2020-03-02 NOTE — ED NOTES
Pt c/o abdominal pain, vomiting, no appetite, not able to pass gas, and no bowel movements x3 weeks.  Pt states she was seen last week and told her potassium was low and she should be admitted but she refused.       Kia Dai, RN  03/02/20 4367

## 2020-03-02 NOTE — ED PROVIDER NOTES
"Subjective   Chief complaint: Abdominal pain chest pain      Context: Patient is a 44-year-old female who comes in with her mother with complaints of abdominal pain and chest pain.  She states that she has not had a bowel movement in approximately 3 to 4 weeks.  Had 2 episodes of vomiting yesterday none today.  She denies any diarrhea.  She states she has a pending appointment March 10 with [facility], she had a colonoscopy several years ago that was normal.  She denies any urinary complaints.  She states she had some chest discomfort walking in today but none currently.  Denies any swelling to her legs or feet.    Duration:weeks    Timing: waxes and wanes    Severity: moderate    Associated symptoms: No relief with stool softeners or MiraLAX, She had a fleets enema last week          PCP: alan      LNMP: Postmenopausal            Review of Systems   Constitutional: Negative.    HENT: Positive for congestion.    Eyes: Negative.    Respiratory: Negative.    Cardiovascular: Positive for chest pain.   Gastrointestinal: Positive for abdominal distention, abdominal pain, constipation, nausea and vomiting.   Genitourinary: Negative.    Musculoskeletal: Negative.    Skin: Negative.    Neurological: Negative.    Hematological: Does not bruise/bleed easily.       Past Medical History:   Diagnosis Date   • ADHD    • Anxiety    • Bipolar 1 disorder (CMS/HCC)    • Depression    • Hep C w/o coma, chronic (CMS/HCC)    • PTSD (post-traumatic stress disorder)    • Substance abuse (CMS/HCC)    • Vitamin D deficiency        Allergies   Allergen Reactions   • Erythromycin Base Hives   • Lithium GI Intolerance     States lithium caused \"all of her organs to shut down\"       Past Surgical History:   Procedure Laterality Date   •  SECTION     • DILATATION AND CURETTAGE      x2   • TONSILLECTOMY         History reviewed. No pertinent family history.    Social History     Socioeconomic History   • Marital status: Single     Spouse name: " Not on file   • Number of children: Not on file   • Years of education: Not on file   • Highest education level: Not on file   Tobacco Use   • Smoking status: Current Every Day Smoker     Packs/day: 1.00     Types: Cigarettes   Substance and Sexual Activity   • Alcohol use: No     Frequency: Never   • Drug use: No   • Sexual activity: Defer           Objective   Physical Exam     Vital signs and triage nurse note reviewed.   Constitutional: Awake, alert; thin appears older than stated age  HEENT: Normocephalic, atraumatic; pupils are PERRL with intact EOM; oropharynx is pink and dry without exudate or erythema.   Neck: Supple, full range of motion without pain; no cervical lymphadenopathy.   Cardiovascular: Regular rate and rhythm, normal S1-S2.   Pulmonary: Respiratory effort regular nonlabored, breath sounds clear to auscultation all fields.   Abdomen: Soft, fused tenderness without peritoneal rigidity somewhat distended with normoactive bowel sounds; no rebound or guarding.   Musculoskeletal: Independent range of motion of all extremities with no palpable tenderness or edema.   Neuro: Alert oriented x3, speech is clear and appropriate, GCS 15   Skin:  Fleshtone warm, dry, intact; no erythematous or petechial rash or lesion       ECG 12 Lead    Date/Time: 3/2/2020 1:38 PM  Performed by: Jackie Olguin APRN  Authorized by: Jackie Olguin APRN   Interpreted by physician (riri)  Comparison: compared with previous ECG from 2/24/2020  Comparison to previous ECG: Sinus rhythm  Rhythm: sinus rhythm  BPM: 94                   ED Course      Labs Reviewed   COMPREHENSIVE METABOLIC PANEL - Abnormal; Notable for the following components:       Result Value    Glucose 141 (*)     Creatinine 1.14 (*)     Potassium 2.4 (*)     Chloride 114 (*)     CO2 14.0 (*)     eGFR Non  Amer 52 (*)     All other components within normal limits    Narrative:     GFR Normal >60  Chronic Kidney Disease <60  Kidney Failure  <15     URINALYSIS W/ MICROSCOPIC IF INDICATED (NO CULTURE) - Abnormal; Notable for the following components:    Protein,  mg/dL (2+) (*)     Leuk Esterase, UA Trace (*)     All other components within normal limits   CBC WITH AUTO DIFFERENTIAL - Abnormal; Notable for the following components:    RBC 3.73 (*)     Hemoglobin 10.4 (*)     Hematocrit 31.4 (*)     RDW 15.9 (*)     Lymphocytes, Absolute 3.30 (*)     All other components within normal limits   URINALYSIS, MICROSCOPIC ONLY - Abnormal; Notable for the following components:    RBC, UA 0-2 (*)     WBC, UA 6-12 (*)     Squamous Epithelial Cells, UA 3-6 (*)     All other components within normal limits   MAGNESIUM - Normal   TROPONIN (IN-HOUSE) - Normal    Narrative:     Troponin T Reference Range:  <= 0.03 ng/mL-   Negative for AMI  >0.03 ng/mL-     Abnormal for myocardial necrosis.  Clinicians would have to utilize clinical acumen, EKG, Troponin and serial changes to determine if it is an Acute Myocardial Infarction or myocardial injury due to an underlying chronic condition.       Results may be falsely decreased if patient taking Biotin.     URINE DRUG SCREEN - Normal    Narrative:     Negative Thresholds For Drugs Screened:     Amphetamines               500 ng/ml   Barbiturates               200 ng/ml   Benzodiazepines            100 ng/ml   Cocaine                    300 ng/ml   Methadone                  300 ng/ml   Opiates                    300 ng/ml   Oxycodone                  100 ng/ml   THC                        50 ng/ml    The Normal Value for all drugs tested is negative. This report includes final unconfirmed screening results to be used for medical treatment purposes only. Unconfirmed results must not be used for non-medical purposes such as employment or legal testing. Clinical consideration should be applied to any drug of abuse test, particulary when unconfirmed results are used.  All urine drugs of abuse requests without chain of  custody are for medical screening purposes only.  False positives are possible.     CBC AND DIFFERENTIAL    Narrative:     The following orders were created for panel order CBC & Differential.  Procedure                               Abnormality         Status                     ---------                               -----------         ------                     CBC Auto Differential[[id number]]        Abnormal            Final result                 Please view results for these tests on the individual orders.     Medications   sodium chloride 0.9 % flush 10 mL (has no administration in time range)   potassium chloride (K-DUR,KLOR-CON) CR tablet 40 mEq (has no administration in time range)     Or   potassium chloride (KLOR-CON) packet 40 mEq (has no administration in time range)     Or   potassium chloride 10 mEq in 100 mL IVPB (has no administration in time range)   sodium chloride 0.9 % bolus 500 mL (0 mL Intravenous Stopped 3/2/20 1505)     Ct Abdomen Pelvis Without Contrast    Result Date: 3/2/2020   1.  Large amount of high density stool throughout the colon which is distended but improved from prior study as detailed above.  Findings would be compatible with patient's history of constipation.  No evidence of bowel obstruction. 2.  Small areas of groundglass airspace disease in both lung bases which is nonspecific and may be infectious or inflammatory in etiology.  Electronically Signed By-Tony Leon On:3/2/2020 3:14 PM This report was finalized on [id number] by  Tony Leon, .    Xr Chest 1 View    Result Date: 3/2/2020  No acute chest findings.  Electronically Signed By-Dr. Heather Romo MD On:3/2/2020 2:33 PM This report was finalized on [id number] by Dr. Heather Romo MD.                                         MDM  Number of Diagnoses or Management Options  Abdominal pain, unspecified abdominal location:   Chest pain, unspecified type:   Constipation, unspecified constipation type:    Hypokalemia:   Diagnosis management comments: Chart review: seen in ER 2/24 Chest pain, unspecified type, Weakness, Generalized abdominal pain, Hypokalemia    Medical decision  Comorbidities:  has a past medical history of ADHD, Anxiety, Bipolar 1 disorder (CMS/HCC), Depression, Hep C w/o coma, chronic (CMS/[facility]), PTSD (post-traumatic stress disorder), Substance abuse (CMS/[facility]), and Vitamin D deficiency.  Differentials: CAD angina electrolyte abnormalities dehydration   not all inclusive of differentials considered  Discussion with provider: Hospitalist  Radiology interpretation:  X-rays reviewed by me and interpreted by radiologist,   Ct Abdomen Pelvis Without Contrast    Result Date: 3/2/2020   1.  Large amount of high density stool throughout the colon which is distended but improved from prior study as detailed above.  Findings would be compatible with patient's history of constipation.  No evidence of bowel obstruction. 2.  Small areas of groundglass airspace disease in both lung bases which is nonspecific and may be infectious or inflammatory in etiology.  Electronically Signed By-Tony Leon On:3/2/2020 3:14 PM This report was finalized on [id number] by  Tony Leon, .    Xr Chest 1 View    Result Date: 3/2/2020  No acute chest findings.  Electronically Signed By-Dr. Heather oRmo MD On:3/2/2020 2:33 PM This report was finalized on [id number] by Dr. Heather Romo MD.    Lab interpretation:  Labs viewed by me significant for, potassium 2.4    i discussed findings with patient who voices understanding of admission  k replacement was ordered         Final diagnoses:   Hypokalemia   Abdominal pain, unspecified abdominal location   Constipation, unspecified constipation type   Chest pain, unspecified type            Jackie Olguin, APRN  03/02/20 1536

## 2020-03-02 NOTE — ED NOTES
Provided pt with urine specimen cup. Pt states she is unable to provide sample at this time.      Sole Bautista  03/02/20 5879

## 2020-03-02 NOTE — H&P
[facility] Medicine Services      Patient Name: Elizabeth Gómez  : 1975  MRN: [id number]  Primary Care Physician: Blanca Welch APRN  Date of admission: 3/2/2020    Patient Care Team:  Blanca Welch APRN as PCP - General (Nurse Practitioner)          Subjective   History Present Illness     Chief Complaint:   Chief Complaint   Patient presents with   • Weakness - Generalized       Ms. Gómez is a 44 y.o. cachectic female who has recently lost 40lbs in the last 3 months presented to the ER 3/2/20 with complaints of chest pressure upon exertion and exertional dyspnea for the last week. She has had upper/mid abdominal pain that is an achy type of pain. She has had nausea and vomiting and been unable to eat. She has not had a bowel movement in 3 weeks. She has been taking laxatives and enemas without improvement in her constipation. She has had decreased urination. She denied forcing herself to vomiting. She does have an upcoming GI appointment. She was seen in the ER  for abdominal pain, constipation, and hypokalemia 2.5. She was sent home with laxatives and has not improved.   Pt is on suboxone and has been for the last two years. She stated she has never had a problem with constipation in the past. She denied any cardiac history or previous cardiac workup. She has a family history of heart disease on her mother's side.     In the ER today the patient had K 2.4, creatinine 1.14, hgb 10.4. UDS negative. CT abdomen showed large amount high density stool throughout the colon which is distended but improved from prior study. Compatible with constipation. Small areas of groundglass airspace disease in both lung bases, which is nonspecific and may be infectious or inflammatory in etiology. WBC normal. No recent cough. EKG showed SR, troponin was normal. She was started on IVFs, potassium replacement. She was admitted for further treatment.          Review of Systems      Constitution: Positive for weight loss.   HENT: Negative.    Eyes: Negative.    Cardiovascular: Positive for chest pain and dyspnea on exertion.   Respiratory: Positive for shortness of breath.    Endocrine: Negative.    Hematologic/Lymphatic: Negative.    Skin: Negative.    Musculoskeletal: Negative.    Gastrointestinal: Positive for abdominal pain, constipation, nausea and vomiting.   Genitourinary: Negative.    Neurological: Positive for weakness.   Psychiatric/Behavioral: Negative.    Allergic/Immunologic: Negative.    All other systems reviewed and are negative.          Personal History     Past Medical History:   Past Medical History:   Diagnosis Date   • ADHD    • Anxiety    • Bipolar 1 disorder (CMS/[facility])    • COPD (chronic obstructive pulmonary disease) (CMS/[facility])    • Depression    • Hep C w/o coma, chronic (CMS/[facility])    • PTSD (post-traumatic stress disorder)    • Substance abuse (CMS/[facility])    • Vitamin D deficiency        Surgical History:      Past Surgical History:   Procedure Laterality Date   •  SECTION     • CHOLECYSTECTOMY     • DILATATION AND CURETTAGE      x2   • TONSILLECTOMY         Family History: family history includes Heart disease in her maternal grandfather, maternal grandmother, and mother.     Social History:  reports that she has been smoking cigarettes. She has been smoking about 1.00 pack per day. She does not have any smokeless tobacco history on file. She reports that she does not drink alcohol or use drugs.      Medications:  Prior to Admission medications    Medication Sig Start Date End Date Taking? Authorizing Provider   buprenorphine-naloxone (SUBOXONE) 8-2 MG film film Place 2 films under the tongue Daily. 19   Gemma Hyatt MD   docusate sodium (COLACE) 100 MG capsule Take 100 mg by mouth 2 (Two) Times a Day.    Provider, MD Laura   fluticasone (FLONASE) 50 MCG/ACT nasal spray 2 sprays into the nostril(s) as directed by provider Daily.    Provider,  "MD Laura   lamoTRIgine (LAMICTAL) 100 MG tablet Take 200 mg by mouth every night at bedtime. 2/8/18   Laura Garcia MD   OLANZapine (zyPREXA) 10 MG tablet Take 2 tablets by mouth Every Night. 8/27/19   Gemma Hyatt MD   polyethylene glycol (MIRALAX) packet Take 17 g by mouth Daily As Needed (constipation).    Laura Garcia MD   potassium chloride (K-DUR,KLOR-CON) 20 MEQ CR tablet Take 1 tablet by mouth 2 (Two) Times a Day. 2/24/20   Elieser Patterson MD   sorbitol 70 % solution Take 60 mL by mouth Daily As Needed (Constipation). 2/24/20   Elieser Patterson MD   umeclidinium-vilanterol (ANORO ELLIPTA) 62.5-25 MCG/INH aerosol powder  inhaler Inhale 1 puff Daily.    Laura Garcia MD   venlafaxine XR (EFFEXOR-XR) 150 MG 24 hr capsule Take 225 mg by mouth Daily.    Laura Garcia MD   albuterol sulfate  (90 Base) MCG/ACT inhaler Inhale 2 puffs Every 6 (Six) Hours As Needed for Wheezing or Shortness of Air.  3/2/20  Laura Garcia MD   gabapentin (NEURONTIN) 100 MG capsule Take 1 capsule by mouth Every 8 (Eight) Hours. 8/27/19 3/2/20  Gemma Hyatt MD   lithium carbonate 300 MG capsule Take 300 mg by mouth Daily.  3/2/20  Laura Garcia MD       Allergies:    Allergies   Allergen Reactions   • Erythromycin Base Hives   • Lithium GI Intolerance     States lithium caused \"all of her organs to shut down\"       Objective   Objective     Vital Signs  Temp:  [97.3 °F (36.3 °C)-98 °F (36.7 °C)] 98 °F (36.7 °C)  Heart Rate:  [] 80  Resp:  [14-16] 16  BP: ()/(58-78) 91/62  SpO2:  [100 %] 100 %  on   ;   Device (Oxygen Therapy): room air  Body mass index is 16.42 kg/m².    Physical Exam   Constitutional: She is oriented to person, place, and time. She appears well-developed and well-nourished. No distress.   HENT:   Head: Normocephalic and atraumatic.   Nose: Nose normal.   Eyes: Pupils are equal, round, and reactive to light. Conjunctivae and EOM are normal.   "   Neck: Normal range of motion.   Cardiovascular: Normal rate, regular rhythm, normal heart sounds and intact distal pulses.   Pulmonary/Chest: Effort normal and breath sounds normal. No stridor. No respiratory distress.   Abdominal: Soft. Bowel sounds are normal. She exhibits distension. There is tenderness. There is no guarding.   Musculoskeletal: Normal range of motion. She exhibits no edema, tenderness or deformity.   Neurological: She is alert and oriented to person, place, and time. No cranial nerve deficit.   Skin: Skin is warm and dry. No rash noted. She is not diaphoretic. No erythema.   Psychiatric: She has a normal mood and affect. Her behavior is normal.   Nursing note and vitals reviewed.      Results Review:  I have personally reviewed most recent cardiac tracings, lab results and radiology images and interpretations and agree with findings.    Results from last 7 days   Lab Units 03/02/20  1424   WBC 10*3/mm3 9.20   HEMOGLOBIN g/dL 10.4*   HEMATOCRIT % 31.4*   PLATELETS 10*3/mm3 301     Results from last 7 days   Lab Units 03/02/20 2014 03/02/20  1424   SODIUM mmol/L  --  139   POTASSIUM mmol/L 2.6* 2.4*   CHLORIDE mmol/L  --  114*   CO2 mmol/L  --  14.0*   BUN mg/dL  --  16   CREATININE mg/dL  --  1.14*   GLUCOSE mg/dL  --  141*   CALCIUM mg/dL  --  10.1   ALT (SGPT) U/L  --  29   AST (SGOT) U/L  --  19   TROPONIN T ng/mL <0.010 <0.010     Estimated Creatinine Clearance: 44 mL/min (A) (by C-G formula based on SCr of 1.14 mg/dL (H)).  Brief Urine Lab Results  (Last result in the past 365 days)      Color   Clarity   Blood   Leuk Est   Nitrite   Protein   CREAT   Urine HCG        03/02/20 1438 Yellow Clear Negative Trace Negative 100 mg/dL (2+)               Microbiology Results (last 10 days)     Procedure Component Value - Date/Time    Urine Culture - Urine, Urine, Clean Catch [[id number]]  (Normal) Collected:  02/24/20 2087    Lab Status:  Final result Specimen:  Urine, Clean Catch Updated:   02/26/20 0347     Urine Culture No growth          ECG/EMG Results (most recent)     Procedure Component Value Units Date/Time    ECG 12 Lead [[id number]] Resulted:  03/02/20 1536     Updated:  03/02/20 1536    ECG 12 Lead [[id number]] Collected:  03/02/20 1317     Updated:  03/02/20 1536    Narrative:       HEART RATE= 94  bpm  RR Interval= 636  ms  UT Interval= 150  ms  P Horizontal Axis= 12  deg  P Front Axis= 79  deg  QRSD Interval= 99  ms  QT Interval= 405  ms  QRS Axis= 43  deg  T Wave Axis= 239  deg  - ABNORMAL ECG -  Sinus rhythm  Repol abnrm suggests ischemia, diffuse leads  When compared with ECG of 24-Feb-2020 13:13:51,  Significant repolarization change  Electronically Signed By: Reno Cortez (Huber) 02-Mar-2020 15:36:39  Date and Time of Study: 2020-03-02 13:17:19          Results for orders placed during the hospital encounter of 08/23/19   Duplex Carotid Ultrasound CAR    Narrative · Proximal right internal carotid artery mild stenosis.  · Proximal left internal carotid artery mild stenosis.          Results for orders placed during the hospital encounter of 08/23/19   Adult Transthoracic Echo Complete W/ Cont if Necessary Per Protocol    Narrative · Estimated EF = 60%.  · Left ventricular systolic function is normal.       Indications  Syncope    Technically satisfactory study.  Mitral valve is structurally normal.  Tricuspid valve is structurally normal.  Aortic valve is structurally normal.  Pulmonic valve could not be well visualized.  No evidence for mitral tricuspid or aortic regurgitation is seen by   Doppler study.  Left atrium is normal in size.  Right atrium is normal in size.  Left ventricle is normal in size and contractility with ejection fraction   of 60%.  Right ventricle is normal in size.  Atrial septum is intact.  Aorta is normal.  No pericardial effusion or intracardiac thrombus is seen.    Impression  Structurally and functionally normal cardiac valves.  Normal echo Doppler  study.  Left ventricular ejection fraction is 60%.           Ct Abdomen Pelvis Without Contrast    Result Date: 3/2/2020   1.  Large amount of high density stool throughout the colon which is distended but improved from prior study as detailed above.  Findings would be compatible with patient's history of constipation.  No evidence of bowel obstruction. 2.  Small areas of groundglass airspace disease in both lung bases which is nonspecific and may be infectious or inflammatory in etiology.  Electronically Signed By-Tony Leon On:3/2/2020 3:14 PM This report was finalized on [id number] by  Tony Leon, .    Ct Abdomen Pelvis With Contrast    Result Date: 2/24/2020   1. No evidence of appendicitis. 2. Massive colonic fecal retention, constipation. 3. Intra and extrahepatic biliary ductal dilatation, may be due to postcholecystectomy change. This was noted on recent ultrasound exam from January 2020 as well. 4. Additional findings as given above.    Electronically Signed By-Patrice Foreman On:2/24/2020 3:50 PM This report was finalized on [id number] by  Patrice Foreman, .    Xr Chest 1 View    Result Date: 3/2/2020  No acute chest findings.  Electronically Signed By-Dr. Heather Romo MD On:3/2/2020 2:33 PM This report was finalized on [id number] by Dr. Heather Romo MD.    Xr Chest 1 View    Result Date: 2/24/2020  No radiographic findings of acute cardiopulmonary abnormality.  Electronically Signed By-DR. Vicente Mendoza MD On:2/24/2020 1:35 PM This report was finalized on [id number] by DR. Vicente Mendoza MD.        Estimated Creatinine Clearance: 44 mL/min (A) (by C-G formula based on SCr of 1.14 mg/dL (H)).    Assessment/Plan   Assessment/Plan       Active Hospital Problems    Diagnosis  POA   • **Chest pain [R07.9]  Yes   • Hypokalemia [E87.6]  Yes   • Abdominal pain [R10.9]  Yes   • PTSD (post-traumatic stress disorder) [F43.10]  Yes   • Hep C w/o coma, chronic (CMS/HCC) [B18.2]  Yes   • Severe  malnutrition (CMS/HCC) [E43]  Yes   • Constipation [K59.00]  Yes   • Vitamin B12 deficiency [E53.8]  Yes   • Bipolar I disorder, most recent episode depressed (CMS/HCC) [F31.30]  Yes   • Generalized anxiety disorder [F41.1]  Yes      Resolved Hospital Problems   No resolved problems to display.       Chest pain, OLIVA  -EKG SR, first troponin normal  -second ER visit for chest pain and dyspnea. Patient is malnourished, which could lead to cardiac abnormalities.  -check serial troponins r/o ACS, Treadmill stress test in am    Abdominal pain secondary to Constipation  -failed outpatient treatment  -CT abdomen: large amount high density stool throughout the colon which is distended but improved from prior study. Compatible with constipation. Small areas of groundglass airspace disease in both lung bases, which is nonspecific and may be infectious or inflammatory in etiology  -mag citrate X1 now and continue laxatives     Hypokalemia  -replacement protocol    Severe protein malnutrition/Weight loss 40lbs in 3 months per verbal report BMI 16.31    Bipolar disorder, depression, PTSD  -cont home lamictal, zyprexa, effexor     H/o Hepatitis C      VTE Prophylaxis - SCDs      CODE STATUS:    Code Status and Medical Interventions:   Ordered at: 03/02/20 1640     Level Of Support Discussed With:    Patient     Code Status:    CPR     Medical Interventions (Level of Support Prior to Arrest):    Full       Admission Status:  I believe this patient meets observation  criteria.      I discussed the patient's findings and my recommendations with patient and family.        Electronically signed by MAXIMUS Olson, 03/02/20, 4:19 PM.  [facility] Hospitalist Team    I have reviewed the notes, assessments, and/or procedures performed by NP, I concur with her/his documentation of Elizabeth Gómez.    Patient was independently seen and examined in the ER.  Patient is a 44-year-old female who came in with shortness of  breath/chest pain as well as constipation and weight loss of 30 to 40 pounds.  Patient does not feel well.  Patient is a chronic smoker and at this time we think patient will need further work-up including replacing her electrolytes which was showing low potassium also she most likely will need upper and lower endoscopy as well as CT scan chest with contrast and mammogram.  Patient will be stabilized and will refer her to GI for scopes.  Patient has high ionized calcium and I will be wondering if patient has some sort of malignancy going on.

## 2020-03-03 ENCOUNTER — APPOINTMENT (OUTPATIENT)
Dept: CARDIOLOGY | Facility: HOSPITAL | Age: 45
End: 2020-03-03

## 2020-03-03 ENCOUNTER — INPATIENT HOSPITAL (AMBULATORY)
Dept: URBAN - METROPOLITAN AREA HOSPITAL 84 | Facility: HOSPITAL | Age: 45
End: 2020-03-03
Payer: COMMERCIAL

## 2020-03-03 ENCOUNTER — APPOINTMENT (OUTPATIENT)
Dept: CT IMAGING | Facility: HOSPITAL | Age: 45
End: 2020-03-03

## 2020-03-03 DIAGNOSIS — K59.00 CONSTIPATION, UNSPECIFIED: ICD-10-CM

## 2020-03-03 DIAGNOSIS — J44.9 CHRONIC OBSTRUCTIVE PULMONARY DISEASE, UNSPECIFIED: ICD-10-CM

## 2020-03-03 DIAGNOSIS — R63.4 ABNORMAL WEIGHT LOSS: ICD-10-CM

## 2020-03-03 DIAGNOSIS — R07.9 CHEST PAIN, UNSPECIFIED: ICD-10-CM

## 2020-03-03 DIAGNOSIS — E87.6 HYPOKALEMIA: ICD-10-CM

## 2020-03-03 DIAGNOSIS — R11.2 NAUSEA WITH VOMITING, UNSPECIFIED: ICD-10-CM

## 2020-03-03 DIAGNOSIS — D64.89 OTHER SPECIFIED ANEMIAS: ICD-10-CM

## 2020-03-03 PROBLEM — D64.9 ANEMIA: Status: ACTIVE | Noted: 2020-03-02

## 2020-03-03 LAB
ALBUMIN SERPL-MCNC: 3.3 G/DL (ref 3.5–5.2)
ALBUMIN/GLOB SERPL: 1.5 G/DL
ALP SERPL-CCNC: 94 U/L (ref 39–117)
ALT SERPL W P-5'-P-CCNC: 25 U/L (ref 1–33)
ANION GAP SERPL CALCULATED.3IONS-SCNC: 7 MMOL/L (ref 5–15)
AST SERPL-CCNC: 21 U/L (ref 1–32)
BASOPHILS # BLD AUTO: 0 10*3/MM3 (ref 0–0.2)
BASOPHILS NFR BLD AUTO: 0.5 % (ref 0–1.5)
BH CV STRESS DURATION MIN STAGE 1: 2
BH CV STRESS DURATION SEC STAGE 1: 6
BH CV STRESS GRADE STAGE 1: 10
BH CV STRESS HR STAGE 1: 126
BH CV STRESS METS STAGE 1: 5
BH CV STRESS PROTOCOL 1: NORMAL
BH CV STRESS RECOVERY HR: 98 BPM
BH CV STRESS SPEED STAGE 1: 1.7
BH CV STRESS STAGE 1: 1
BILIRUB SERPL-MCNC: 0.2 MG/DL (ref 0.2–1.2)
BUN BLD-MCNC: 12 MG/DL (ref 6–20)
BUN/CREAT SERPL: 14.1 (ref 7–25)
CALCIUM SPEC-SCNC: 7.7 MG/DL (ref 8.6–10.5)
CHLORIDE SERPL-SCNC: 120 MMOL/L (ref 98–107)
CHOLEST SERPL-MCNC: 82 MG/DL (ref 0–200)
CK SERPL-CCNC: 39 U/L (ref 20–180)
CO2 SERPL-SCNC: 14 MMOL/L (ref 22–29)
CREAT BLD-MCNC: 0.85 MG/DL (ref 0.57–1)
CRP SERPL-MCNC: 0.05 MG/DL (ref 0–0.5)
DEPRECATED RDW RBC AUTO: 48.6 FL (ref 37–54)
EOSINOPHIL # BLD AUTO: 0.1 10*3/MM3 (ref 0–0.4)
EOSINOPHIL NFR BLD AUTO: 0.7 % (ref 0.3–6.2)
ERYTHROCYTE [DISTWIDTH] IN BLOOD BY AUTOMATED COUNT: 16.3 % (ref 12.3–15.4)
ERYTHROCYTE [SEDIMENTATION RATE] IN BLOOD: 12 MM/HR (ref 0–20)
FERRITIN SERPL-MCNC: 219 NG/ML (ref 13–150)
GFR SERPL CREATININE-BSD FRML MDRD: 73 ML/MIN/1.73
GLOBULIN UR ELPH-MCNC: 2.2 GM/DL
GLUCOSE BLD-MCNC: 107 MG/DL (ref 65–99)
HBA1C MFR BLD: 4.9 % (ref 3.5–5.6)
HCT VFR BLD AUTO: 27 % (ref 34–46.6)
HDLC SERPL-MCNC: 30 MG/DL (ref 40–60)
HGB BLD-MCNC: 8.9 G/DL (ref 12–15.9)
LDLC SERPL CALC-MCNC: 39 MG/DL (ref 0–100)
LDLC/HDLC SERPL: 1.31 {RATIO}
LYMPHOCYTES # BLD AUTO: 4.6 10*3/MM3 (ref 0.7–3.1)
LYMPHOCYTES NFR BLD AUTO: 47.4 % (ref 19.6–45.3)
MAGNESIUM SERPL-MCNC: 2.2 MG/DL (ref 1.6–2.6)
MAXIMAL PREDICTED HEART RATE: 178 BPM
MCH RBC QN AUTO: 27.8 PG (ref 26.6–33)
MCHC RBC AUTO-ENTMCNC: 32.7 G/DL (ref 31.5–35.7)
MCV RBC AUTO: 84.7 FL (ref 79–97)
MONOCYTES # BLD AUTO: 0.5 10*3/MM3 (ref 0.1–0.9)
MONOCYTES NFR BLD AUTO: 5.3 % (ref 5–12)
NEUTROPHILS # BLD AUTO: 4.4 10*3/MM3 (ref 1.7–7)
NEUTROPHILS NFR BLD AUTO: 46.1 % (ref 42.7–76)
NRBC BLD AUTO-RTO: 0.2 /100 WBC (ref 0–0.2)
PERCENT MAX PREDICTED HR: 70.79 %
PHOSPHATE SERPL-MCNC: 1.5 MG/DL (ref 2.5–4.5)
PLATELET # BLD AUTO: 243 10*3/MM3 (ref 140–450)
PMV BLD AUTO: 8.8 FL (ref 6–12)
POTASSIUM BLD-SCNC: 2.8 MMOL/L (ref 3.5–5.2)
PROT SERPL-MCNC: 5.5 G/DL (ref 6–8.5)
RBC # BLD AUTO: 3.19 10*6/MM3 (ref 3.77–5.28)
SODIUM BLD-SCNC: 141 MMOL/L (ref 136–145)
STRESS BASELINE BP: NORMAL MMHG
STRESS BASELINE HR: 92 BPM
STRESS PERCENT HR: 83 %
STRESS POST ESTIMATED WORKLOAD: 4.6 METS
STRESS POST EXERCISE DUR MIN: 2 MIN
STRESS POST EXERCISE DUR SEC: 9 SEC
STRESS POST PEAK HR: 126 BPM
STRESS TARGET HR: 151 BPM
TRIGL SERPL-MCNC: 63 MG/DL (ref 0–150)
TROPONIN T SERPL-MCNC: <0.01 NG/ML (ref 0–0.03)
TSH SERPL DL<=0.05 MIU/L-ACNC: 6.56 UIU/ML (ref 0.27–4.2)
URATE SERPL-MCNC: 2 MG/DL (ref 2.4–5.7)
VIT B12 BLD-MCNC: 513 PG/ML (ref 211–946)
VLDLC SERPL-MCNC: 12.6 MG/DL
WBC NRBC COR # BLD: 9.6 10*3/MM3 (ref 3.4–10.8)

## 2020-03-03 PROCEDURE — 82728 ASSAY OF FERRITIN: CPT | Performed by: INTERNAL MEDICINE

## 2020-03-03 PROCEDURE — 82607 VITAMIN B-12: CPT | Performed by: INTERNAL MEDICINE

## 2020-03-03 PROCEDURE — 82550 ASSAY OF CK (CPK): CPT | Performed by: INTERNAL MEDICINE

## 2020-03-03 PROCEDURE — 84443 ASSAY THYROID STIM HORMONE: CPT | Performed by: INTERNAL MEDICINE

## 2020-03-03 PROCEDURE — 99223 1ST HOSP IP/OBS HIGH 75: CPT | Performed by: NURSE PRACTITIONER

## 2020-03-03 PROCEDURE — 84100 ASSAY OF PHOSPHORUS: CPT | Performed by: INTERNAL MEDICINE

## 2020-03-03 PROCEDURE — 85025 COMPLETE CBC W/AUTO DIFF WBC: CPT | Performed by: INTERNAL MEDICINE

## 2020-03-03 PROCEDURE — 99233 SBSQ HOSP IP/OBS HIGH 50: CPT | Performed by: INTERNAL MEDICINE

## 2020-03-03 PROCEDURE — 85652 RBC SED RATE AUTOMATED: CPT | Performed by: INTERNAL MEDICINE

## 2020-03-03 PROCEDURE — 93018 CV STRESS TEST I&R ONLY: CPT | Performed by: INTERNAL MEDICINE

## 2020-03-03 PROCEDURE — 93016 CV STRESS TEST SUPVJ ONLY: CPT | Performed by: INTERNAL MEDICINE

## 2020-03-03 PROCEDURE — 94799 UNLISTED PULMONARY SVC/PX: CPT

## 2020-03-03 PROCEDURE — 71260 CT THORAX DX C+: CPT

## 2020-03-03 PROCEDURE — 93017 CV STRESS TEST TRACING ONLY: CPT

## 2020-03-03 PROCEDURE — 63710000001 ONDANSETRON PER 8 MG: Performed by: NURSE PRACTITIONER

## 2020-03-03 PROCEDURE — 84550 ASSAY OF BLOOD/URIC ACID: CPT | Performed by: INTERNAL MEDICINE

## 2020-03-03 PROCEDURE — 84484 ASSAY OF TROPONIN QUANT: CPT | Performed by: NURSE PRACTITIONER

## 2020-03-03 PROCEDURE — 80053 COMPREHEN METABOLIC PANEL: CPT | Performed by: INTERNAL MEDICINE

## 2020-03-03 PROCEDURE — 0 IOPAMIDOL PER 1 ML: Performed by: INTERNAL MEDICINE

## 2020-03-03 PROCEDURE — 86140 C-REACTIVE PROTEIN: CPT | Performed by: INTERNAL MEDICINE

## 2020-03-03 PROCEDURE — 83735 ASSAY OF MAGNESIUM: CPT | Performed by: NURSE PRACTITIONER

## 2020-03-03 PROCEDURE — 83036 HEMOGLOBIN GLYCOSYLATED A1C: CPT | Performed by: INTERNAL MEDICINE

## 2020-03-03 PROCEDURE — 80061 LIPID PANEL: CPT | Performed by: INTERNAL MEDICINE

## 2020-03-03 PROCEDURE — 25810000003 SODIUM CHLORIDE 0.9 % WITH KCL 20 MEQ 20-0.9 MEQ/L-% SOLUTION: Performed by: NURSE PRACTITIONER

## 2020-03-03 PROCEDURE — 25010000002 ONDANSETRON PER 1 MG: Performed by: NURSE PRACTITIONER

## 2020-03-03 RX ORDER — BUPRENORPHINE HYDROCHLORIDE AND NALOXONE HYDROCHLORIDE DIHYDRATE 8; 2 MG/1; MG/1
0.5 TABLET SUBLINGUAL 4 TIMES DAILY
Status: DISCONTINUED | OUTPATIENT
Start: 2020-03-03 | End: 2020-03-06 | Stop reason: HOSPADM

## 2020-03-03 RX ORDER — PANTOPRAZOLE SODIUM 40 MG/10ML
40 INJECTION, POWDER, LYOPHILIZED, FOR SOLUTION INTRAVENOUS
Status: DISCONTINUED | OUTPATIENT
Start: 2020-03-03 | End: 2020-03-04

## 2020-03-03 RX ORDER — MAGNESIUM CARB/ALUMINUM HYDROX 105-160MG
150 TABLET,CHEWABLE ORAL ONCE
Status: COMPLETED | OUTPATIENT
Start: 2020-03-03 | End: 2020-03-03

## 2020-03-03 RX ORDER — SODIUM CHLORIDE 0.9 % (FLUSH) 0.9 %
3 SYRINGE (ML) INJECTION EVERY 12 HOURS SCHEDULED
Status: CANCELLED | OUTPATIENT
Start: 2020-03-03

## 2020-03-03 RX ORDER — POTASSIUM CHLORIDE 20 MEQ/1
40 TABLET, EXTENDED RELEASE ORAL EVERY 4 HOURS
Status: DISPENSED | OUTPATIENT
Start: 2020-03-03 | End: 2020-03-03

## 2020-03-03 RX ORDER — SODIUM CHLORIDE 0.9 % (FLUSH) 0.9 %
10 SYRINGE (ML) INJECTION AS NEEDED
Status: CANCELLED | OUTPATIENT
Start: 2020-03-03

## 2020-03-03 RX ORDER — PEG-3350, SODIUM SULFATE, SODIUM CHLORIDE, POTASSIUM CHLORIDE, SODIUM ASCORBATE AND ASCORBIC ACID 7.5-2.691G
1000 KIT ORAL EVERY 12 HOURS
Status: COMPLETED | OUTPATIENT
Start: 2020-03-03 | End: 2020-03-04

## 2020-03-03 RX ORDER — POTASSIUM CHLORIDE 20 MEQ/1
40 TABLET, EXTENDED RELEASE ORAL AS NEEDED
Status: DISCONTINUED | OUTPATIENT
Start: 2020-03-03 | End: 2020-03-06 | Stop reason: HOSPADM

## 2020-03-03 RX ORDER — POTASSIUM CHLORIDE 7.45 MG/ML
10 INJECTION INTRAVENOUS
Status: DISCONTINUED | OUTPATIENT
Start: 2020-03-03 | End: 2020-03-06 | Stop reason: HOSPADM

## 2020-03-03 RX ORDER — BISACODYL 5 MG/1
10 TABLET, DELAYED RELEASE ORAL ONCE
Status: COMPLETED | OUTPATIENT
Start: 2020-03-03 | End: 2020-03-03

## 2020-03-03 RX ORDER — POTASSIUM CHLORIDE 1.5 G/1.77G
40 POWDER, FOR SOLUTION ORAL AS NEEDED
Status: DISCONTINUED | OUTPATIENT
Start: 2020-03-03 | End: 2020-03-06 | Stop reason: HOSPADM

## 2020-03-03 RX ORDER — METOCLOPRAMIDE 10 MG/1
10 TABLET ORAL ONCE
Status: COMPLETED | OUTPATIENT
Start: 2020-03-03 | End: 2020-03-03

## 2020-03-03 RX ADMIN — IPRATROPIUM BROMIDE AND ALBUTEROL SULFATE 3 ML: .5; 3 SOLUTION RESPIRATORY (INHALATION) at 22:10

## 2020-03-03 RX ADMIN — Medication 10 ML: at 20:31

## 2020-03-03 RX ADMIN — SODIUM CHLORIDE AND POTASSIUM CHLORIDE 75 ML/HR: 9; 1.49 INJECTION, SOLUTION INTRAVENOUS at 00:15

## 2020-03-03 RX ADMIN — Medication 10 ML: at 08:26

## 2020-03-03 RX ADMIN — METHYLNALTREXONE BROMIDE 450 MG: 150 TABLET ORAL at 08:13

## 2020-03-03 RX ADMIN — IPRATROPIUM BROMIDE AND ALBUTEROL SULFATE 3 ML: .5; 3 SOLUTION RESPIRATORY (INHALATION) at 01:53

## 2020-03-03 RX ADMIN — POTASSIUM CHLORIDE 40 MEQ: 1500 TABLET, EXTENDED RELEASE ORAL at 13:24

## 2020-03-03 RX ADMIN — BUPRENORPHINE AND NALOXONE 0.5 TABLET: 8; 2 TABLET SUBLINGUAL at 10:32

## 2020-03-03 RX ADMIN — BISACODYL 10 MG: 5 TABLET, COATED ORAL at 23:41

## 2020-03-03 RX ADMIN — ONDANSETRON HYDROCHLORIDE 4 MG: 4 TABLET, FILM COATED ORAL at 15:54

## 2020-03-03 RX ADMIN — BUSPIRONE HYDROCHLORIDE 30 MG: 15 TABLET ORAL at 20:25

## 2020-03-03 RX ADMIN — BISACODYL 5 MG: 5 TABLET, COATED ORAL at 11:18

## 2020-03-03 RX ADMIN — POTASSIUM CHLORIDE 40 MEQ: 1500 TABLET, EXTENDED RELEASE ORAL at 08:14

## 2020-03-03 RX ADMIN — DOCUSATE SODIUM 100 MG: 100 CAPSULE, LIQUID FILLED ORAL at 08:24

## 2020-03-03 RX ADMIN — ZOLPIDEM TARTRATE 5 MG: 5 TABLET, COATED ORAL at 00:14

## 2020-03-03 RX ADMIN — POLYETHYLENE GLYCOL 3350 17 G: 17 POWDER, FOR SOLUTION ORAL at 10:33

## 2020-03-03 RX ADMIN — DOCUSATE SODIUM 100 MG: 100 CAPSULE, LIQUID FILLED ORAL at 20:25

## 2020-03-03 RX ADMIN — VENLAFAXINE HYDROCHLORIDE 150 MG: 75 CAPSULE, EXTENDED RELEASE ORAL at 08:14

## 2020-03-03 RX ADMIN — POTASSIUM & SODIUM PHOSPHATES POWDER PACK 280-160-250 MG 1 PACKET: 280-160-250 PACK at 20:31

## 2020-03-03 RX ADMIN — BUPRENORPHINE AND NALOXONE 0.5 TABLET: 8; 2 TABLET SUBLINGUAL at 20:26

## 2020-03-03 RX ADMIN — OLANZAPINE 20 MG: 10 TABLET, FILM COATED ORAL at 20:31

## 2020-03-03 RX ADMIN — ONDANSETRON 4 MG: 2 INJECTION INTRAMUSCULAR; INTRAVENOUS at 08:14

## 2020-03-03 RX ADMIN — LAMOTRIGINE 200 MG: 100 TABLET ORAL at 20:31

## 2020-03-03 RX ADMIN — IOPAMIDOL 100 ML: 755 INJECTION, SOLUTION INTRAVENOUS at 10:45

## 2020-03-03 RX ADMIN — PANTOPRAZOLE SODIUM 40 MG: 40 INJECTION, POWDER, FOR SOLUTION INTRAVENOUS at 13:24

## 2020-03-03 RX ADMIN — LAMOTRIGINE 200 MG: 100 TABLET ORAL at 00:13

## 2020-03-03 RX ADMIN — POLYETHYLENE GLYCOL 3350, SODIUM SULFATE, SODIUM CHLORIDE, POTASSIUM CHLORIDE, ASCORBIC ACID, SODIUM ASCORBATE 1000 ML: KIT at 13:26

## 2020-03-03 RX ADMIN — METOCLOPRAMIDE 10 MG: 10 TABLET ORAL at 08:14

## 2020-03-03 RX ADMIN — DOCUSATE SODIUM 100 MG: 100 CAPSULE, LIQUID FILLED ORAL at 00:13

## 2020-03-03 RX ADMIN — ZOLPIDEM TARTRATE 5 MG: 5 TABLET, COATED ORAL at 20:25

## 2020-03-03 RX ADMIN — BUSPIRONE HYDROCHLORIDE 30 MG: 15 TABLET ORAL at 00:13

## 2020-03-03 RX ADMIN — IPRATROPIUM BROMIDE AND ALBUTEROL SULFATE 3 ML: .5; 3 SOLUTION RESPIRATORY (INHALATION) at 14:45

## 2020-03-03 RX ADMIN — OLANZAPINE 20 MG: 10 TABLET, FILM COATED ORAL at 00:13

## 2020-03-03 RX ADMIN — Medication 150 ML: at 08:13

## 2020-03-03 RX ADMIN — POTASSIUM CHLORIDE 40 MEQ: 1500 TABLET, EXTENDED RELEASE ORAL at 17:39

## 2020-03-03 RX ADMIN — FLUTICASONE PROPIONATE 2 SPRAY: 50 SPRAY, METERED NASAL at 08:13

## 2020-03-03 RX ADMIN — BUSPIRONE HYDROCHLORIDE 30 MG: 15 TABLET ORAL at 08:24

## 2020-03-03 RX ADMIN — BUPRENORPHINE AND NALOXONE 0.5 TABLET: 8; 2 TABLET SUBLINGUAL at 17:39

## 2020-03-03 NOTE — PLAN OF CARE
Problem: Patient Care Overview  Goal: Plan of Care Review  Outcome: Ongoing (interventions implemented as appropriate)  Flowsheets (Taken 3/3/2020 0246)  Progress: no change  Plan of Care Reviewed With: patient  Outcome Summary: Pt resting comfortably in bed. Breathing even and unlabored. No c/o pain or discomfort at this  time. Will continue to monitor.

## 2020-03-03 NOTE — PAYOR COMM NOTE
"  INPATIENT AUTH REQUESTED.    DX: CHEST PAIN R07.9 /  HYPOKALEMIA E87.6  /  ABDOMINAL PAIN R10.9 / FAILURE TO THRIVE R62.7        Kayleigh Barnes RN  Utilization Review          [place] [id number] [facility] [place]  [address] [phone] Office  [phone] Fax  [email or web address]       Elizabeth Glaser (44 y.o. Female)     Date of Birth Social Security Number Address Home Phone MRN    1975  [address] [facility] [place] [address] [phone] [id number]    [employer] Mar[employer] Status          None Single       Admission Date Admission Type Admitting Provider Attending Provider Department, Room/Bed    3/2/20 Emergency Caroline Tidwell MD Seo, Mi La, MD [facility] 2B MEDICAL INPATIENT,     Discharge Date Discharge Disposition Discharge Destination                       Attending Provider:  Caroline Tidwell MD    Allergies:  Erythromycin Base, [place]    Isolation:  None   Infection:  None   Code Status:  CPR    Ht:  162.6 cm (64\")   Wt:  44.6 kg (98 lb 5.2 oz)    Admission Cmt:  None   Principal Problem:  Chest pain [R07.9]                 Active Insurance as of 3/2/2020     Primary Coverage     Payor Plan Insurance Group Employer/Plan Group    [facility] MEDICAID [facility]      Payor Plan Address Payor Plan Phone Number Payor Plan Fax Number Effective Dates    [address] [phone]  2019 - None Entered    [place] [id number]       Subscriber Name Subscriber Birth Date Member ID       ELIZABETH GLASER 1975 [id number]                 Emergency Contacts      (Rel.) Home Phone Work Phone Mobile Phone    CONSUELO DAVILA (Mother) [phone] -- [phone]               History & Physical      Adebayo Pino MD at 20 1619                [facility]      Patient Name: Elizabeth Glaser  : 1975  MRN: [id number]  Primary Care Physician: Blanca Welch, APRN  Date " of admission: 3/2/2020    Patient Care Team:  Blanca Welch APRN as PCP - General (Nurse Practitioner)          Subjective   History Present Illness     Chief Complaint:   Chief Complaint   Patient presents with   • Weakness - Generalized       Ms. Gómez is a 44 y.o. cachectic female who has recently lost 40lbs in the last 3 months presented to the ER 3/2/20 with complaints of chest pressure upon exertion and exertional dyspnea for the last week. She has had upper/mid abdominal pain that is an achy type of pain. She has had nausea and vomiting and been unable to eat. She has not had a bowel movement in 3 weeks. She has been taking laxatives and enemas without improvement in her constipation. She has had decreased urination. She denied forcing herself to vomiting. She does have an upcoming GI appointment. She was seen in the ER 2/24 for abdominal pain, constipation, and hypokalemia 2.5. She was sent home with laxatives and has not improved.   Pt is on suboxone and has been for the last two years. She stated she has never had a problem with constipation in the past. She denied any cardiac history or previous cardiac workup. She has a family history of heart disease on her mother's side.     In the ER today the patient had K 2.4, creatinine 1.14, hgb 10.4. UDS negative. CT abdomen showed large amount high density stool throughout the colon which is distended but improved from prior study. Compatible with constipation. Small areas of groundglass airspace disease in both lung bases, which is nonspecific and may be infectious or inflammatory in etiology. WBC normal. No recent cough. EKG showed SR, troponin was normal. She was started on IVFs, potassium replacement. She was admitted for further treatment.          Review of Systems   Constitution: Positive for weight loss.   HENT: Negative.    Eyes: Negative.    Cardiovascular: Positive for chest pain and dyspnea on exertion.   Respiratory: Positive for shortness  of breath.    Endocrine: Negative.    Hematologic/Lymphatic: Negative.    Skin: Negative.    Musculoskeletal: Negative.    Gastrointestinal: Positive for abdominal pain, constipation, nausea and vomiting.   Genitourinary: Negative.    Neurological: Positive for weakness.   Psychiatric/Behavioral: Negative.    Allergic/Immunologic: Negative.    All other systems reviewed and are negative.          Personal History     Past Medical History:   Past Medical History:   Diagnosis Date   • ADHD    • Anxiety    • Bipolar 1 disorder (CMS/[facility])    • COPD (chronic obstructive pulmonary disease) (CMS/[facility])    • Depression    • Hep C w/o coma, chronic (CMS/[facility])    • PTSD (post-traumatic stress disorder)    • Substance abuse (CMS/[facility])    • Vitamin D deficiency        Surgical History:      Past Surgical History:   Procedure Laterality Date   •  SECTION     • CHOLECYSTECTOMY     • DILATATION AND CURETTAGE      x2   • TONSILLECTOMY         Family History: family history includes Heart disease in her maternal grandfather, maternal grandmother, and mother.     Social History:  reports that she has been smoking cigarettes. She has been smoking about 1.00 pack per day. She does not have any smokeless tobacco history on file. She reports that she does not drink alcohol or use drugs.      Medications:  Prior to Admission medications    Medication Sig Start Date End Date Taking? Authorizing Provider   buprenorphine-naloxone (SUBOXONE) 8-2 MG film film Place 2 films under the tongue Daily. 19   Gemma Hyatt MD   docusate sodium (COLACE) 100 MG capsule Take 100 mg by mouth 2 (Two) Times a Day.    Laura Garcia MD   fluticasone (FLONASE) 50 MCG/ACT nasal spray 2 sprays into the nostril(s) as directed by provider Daily.    Laura Garcia MD   lamoTRIgine (LAMICTAL) 100 MG tablet Take 200 mg by mouth every night at bedtime. 18   Laura Garcia MD   OLANZapine (zyPREXA) 10 MG tablet Take 2 tablets by mouth  "Every Night. 8/27/19   Gemma Hyatt MD   polyethylene glycol (MIRALAX) packet Take 17 g by mouth Daily As Needed (constipation).    ProviderLaura MD   potassium chloride (K-DUR,KLOR-CON) 20 MEQ CR tablet Take 1 tablet by mouth 2 (Two) Times a Day. 2/24/20   Elieser Patterson MD   sorbitol 70 % solution Take 60 mL by mouth Daily As Needed (Constipation). 2/24/20   Elieser Patterson MD   umeclidinium-vilanterol (ANORO ELLIPTA) 62.5-25 MCG/INH aerosol powder  inhaler Inhale 1 puff Daily.    Laura Garcia MD   venlafaxine XR (EFFEXOR-XR) 150 MG 24 hr capsule Take 225 mg by mouth Daily.    Laura Garcia MD   albuterol sulfate  (90 Base) MCG/ACT inhaler Inhale 2 puffs Every 6 (Six) Hours As Needed for Wheezing or Shortness of Air.  3/2/20  Laura Garcia MD   gabapentin (NEURONTIN) 100 MG capsule Take 1 capsule by mouth Every 8 (Eight) Hours. 8/27/19 3/2/20  Gemma Hyatt MD   lithium carbonate 300 MG capsule Take 300 mg by mouth Daily.  3/2/20  Laura Garcia MD       Allergies:    Allergies   Allergen Reactions   • Erythromycin Base Hives   • Lithium GI Intolerance     States lithium caused \"all of her organs to shut down\"       Objective   Objective     Vital Signs  Temp:  [97.3 °F (36.3 °C)-98 °F (36.7 °C)] 98 °F (36.7 °C)  Heart Rate:  [] 80  Resp:  [14-16] 16  BP: ()/(58-78) 91/62  SpO2:  [100 %] 100 %  on   ;   Device (Oxygen Therapy): room air  Body mass index is 16.42 kg/m².    Physical Exam   Constitutional: She is oriented to person, place, and time. She appears well-developed and well-nourished. No distress.   HENT:   Head: Normocephalic and atraumatic.   Nose: Nose normal.   Eyes: Pupils are equal, round, and reactive to light. Conjunctivae and EOM are normal.   Neck: Normal range of motion.   Cardiovascular: Normal rate, regular rhythm, normal heart sounds and intact distal pulses.   Pulmonary/Chest: Effort normal and breath sounds normal. No stridor. " No respiratory distress.   Abdominal: Soft. Bowel sounds are normal. She exhibits distension. There is tenderness. There is no guarding.   Musculoskeletal: Normal range of motion. She exhibits no edema, tenderness or deformity.   Neurological: She is alert and oriented to person, place, and time. No cranial nerve deficit.   Skin: Skin is warm and dry. No rash noted. She is not diaphoretic. No erythema.   Psychiatric: She has a normal mood and affect. Her behavior is normal.   Nursing note and vitals reviewed.      Results Review:  I have personally reviewed most recent cardiac tracings, lab results and radiology images and interpretations and agree with findings.    Results from last 7 days   Lab Units 03/02/20  1424   WBC 10*3/mm3 9.20   HEMOGLOBIN g/dL 10.4*   HEMATOCRIT % 31.4*   PLATELETS 10*3/mm3 301     Results from last 7 days   Lab Units 03/02/20 2014 03/02/20  1424   SODIUM mmol/L  --  139   POTASSIUM mmol/L 2.6* 2.4*   CHLORIDE mmol/L  --  114*   CO2 mmol/L  --  14.0*   BUN mg/dL  --  16   CREATININE mg/dL  --  1.14*   GLUCOSE mg/dL  --  141*   CALCIUM mg/dL  --  10.1   ALT (SGPT) U/L  --  29   AST (SGOT) U/L  --  19   TROPONIN T ng/mL <0.010 <0.010     Estimated Creatinine Clearance: 44 mL/min (A) (by C-G formula based on SCr of 1.14 mg/dL (H)).  Brief Urine Lab Results  (Last result in the past 365 days)      Color   Clarity   Blood   Leuk Est   Nitrite   Protein   CREAT   Urine HCG        03/02/20 1438 Yellow Clear Negative Trace Negative 100 mg/dL (2+)               Microbiology Results (last 10 days)     Procedure Component Value - Date/Time    Urine Culture - Urine, Urine, Clean Catch [[id number]]  (Normal) Collected:  02/24/20 1517    Lab Status:  Final result Specimen:  Urine, Clean Catch Updated:  02/26/20 0347     Urine Culture No growth          ECG/EMG Results (most recent)     Procedure Component Value Units Date/Time    ECG 12 Lead [[id number]] Resulted:  03/02/20 1536     Updated:  03/02/20  1536    ECG 12 Lead [[id number]] Collected:  03/02/20 1317     Updated:  03/02/20 1536    Narrative:       HEART RATE= 94  bpm  RR Interval= 636  ms  PA Interval= 150  ms  P Horizontal Axis= 12  deg  P Front Axis= 79  deg  QRSD Interval= 99  ms  QT Interval= 405  ms  QRS Axis= 43  deg  T Wave Axis= 239  deg  - ABNORMAL ECG -  Sinus rhythm  Repol abnrm suggests ischemia, diffuse leads  When compared with ECG of 24-Feb-2020 13:13:51,  Significant repolarization change  Electronically Signed By: Reno Cortez (Huber) 02-Mar-2020 15:36:39  Date and Time of Study: 2020-03-02 13:17:19          Results for orders placed during the hospital encounter of 08/23/19   Duplex Carotid Ultrasound CAR    Narrative · Proximal right internal carotid artery mild stenosis.  · Proximal left internal carotid artery mild stenosis.          Results for orders placed during the hospital encounter of 08/23/19   Adult Transthoracic Echo Complete W/ Cont if Necessary Per Protocol    Narrative · Estimated EF = 60%.  · Left ventricular systolic function is normal.       Indications  Syncope    Technically satisfactory study.  Mitral valve is structurally normal.  Tricuspid valve is structurally normal.  Aortic valve is structurally normal.  Pulmonic valve could not be well visualized.  No evidence for mitral tricuspid or aortic regurgitation is seen by   Doppler study.  Left atrium is normal in size.  Right atrium is normal in size.  Left ventricle is normal in size and contractility with ejection fraction   of 60%.  Right ventricle is normal in size.  Atrial septum is intact.  Aorta is normal.  No pericardial effusion or intracardiac thrombus is seen.    Impression  Structurally and functionally normal cardiac valves.  Normal echo Doppler study.  Left ventricular ejection fraction is 60%.           Ct Abdomen Pelvis Without Contrast    Result Date: 3/2/2020   1.  Large amount of high density stool throughout the colon which is distended but  improved from prior study as detailed above.  Findings would be compatible with patient's history of constipation.  No evidence of bowel obstruction. 2.  Small areas of groundglass airspace disease in both lung bases which is nonspecific and may be infectious or inflammatory in etiology.  Electronically Signed By-Tony Leon On:3/2/2020 3:14 PM This report was finalized on [id number] by  Tony Leon, .    Ct Abdomen Pelvis With Contrast    Result Date: 2/24/2020   1. No evidence of appendicitis. 2. Massive colonic fecal retention, constipation. 3. Intra and extrahepatic biliary ductal dilatation, may be due to postcholecystectomy change. This was noted on recent ultrasound exam from January 2020 as well. 4. Additional findings as given above.    Electronically Signed By-Patrice Foreman On:2/24/2020 3:50 PM This report was finalized on [id number] by  Patrice Foreman, .    Xr Chest 1 View    Result Date: 3/2/2020  No acute chest findings.  Electronically Signed By-Dr. Heather Romo MD On:3/2/2020 2:33 PM This report was finalized on [id number] by Dr. Heather Romo MD.    Xr Chest 1 View    Result Date: 2/24/2020  No radiographic findings of acute cardiopulmonary abnormality.  Electronically Signed By-DR. Vicente Mendoza MD On:2/24/2020 1:35 PM This report was finalized on [id number] by DR. Vicente Mendoza MD.        Estimated Creatinine Clearance: 44 mL/min (A) (by C-G formula based on SCr of 1.14 mg/dL (H)).    Assessment/Plan   Assessment/Plan       Active Hospital Problems    Diagnosis  POA   • **Chest pain [R07.9]  Yes   • Hypokalemia [E87.6]  Yes   • Abdominal pain [R10.9]  Yes   • PTSD (post-traumatic stress disorder) [F43.10]  Yes   • Hep C w/o coma, chronic (CMS/HCC) [B18.2]  Yes   • Severe malnutrition (CMS/HCC) [E43]  Yes   • Constipation [K59.00]  Yes   • Vitamin B12 deficiency [E53.8]  Yes   • Bipolar I disorder, most recent episode depressed (CMS/HCC) [F31.30]  Yes   • Generalized anxiety  disorder [F41.1]  Yes      Resolved Hospital Problems   No resolved problems to display.       Chest pain, OLIVA  -EKG SR, first troponin normal  -second ER visit for chest pain and dyspnea. Patient is malnourished, which could lead to cardiac abnormalities.  -check serial troponins r/o ACS, Treadmill stress test in am    Abdominal pain secondary to Constipation  -failed outpatient treatment  -CT abdomen: large amount high density stool throughout the colon which is distended but improved from prior study. Compatible with constipation. Small areas of groundglass airspace disease in both lung bases, which is nonspecific and may be infectious or inflammatory in etiology  -mag citrate X1 now and continue laxatives     Hypokalemia  -replacement protocol    Severe protein malnutrition/Weight loss 40lbs in 3 months per verbal report BMI 16.31    Bipolar disorder, depression, PTSD  -cont home lamictal, zyprexa, effexor     H/o Hepatitis C      VTE Prophylaxis - SCDs      CODE STATUS:    Code Status and Medical Interventions:   Ordered at: 03/02/20 1640     Level Of Support Discussed With:    Patient     Code Status:    CPR     Medical Interventions (Level of Support Prior to Arrest):    Full       Admission Status:  I believe this patient meets observation  criteria.      I discussed the patient's findings and my recommendations with patient and family.        Electronically signed by MAXIMUS Olson, 03/02/20, 4:19 PM.  [facility] Hospitalist Team    I have reviewed the notes, assessments, and/or procedures performed by NP, I concur with her/his documentation of Elizabeth Gómez.    Patient was independently seen and examined in the ER.  Patient is a 44-year-old female who came in with shortness of breath/chest pain as well as constipation and weight loss of 30 to 40 pounds.  Patient does not feel well.  Patient is a chronic smoker and at this time we think patient will need further work-up including replacing  her electrolytes which was showing low potassium also she most likely will need upper and lower endoscopy as well as CT scan chest with contrast and mammogram.  Patient will be stabilized and will refer her to GI for scopes.  Patient has high ionized calcium and I will be wondering if patient has some sort of malignancy going on.    Electronically signed by Adebayo Pino MD at 03/02/20 2350       Physician Progress Notes (last 24 hours) (Notes from 03/02/20 1420 through 03/03/20 1420)    No notes of this type exist for this encounter.            Consult Notes (last 24 hours) (Notes from 03/02/20 1420 through 03/03/20 1420)      Amrita Santos APRN at 03/03/20 1110      Consult Orders    1. Inpatient Gastroenterology Consult [[id number]] ordered by Adebayo Pino MD at 03/02/20 2351                GI CONSULT  NOTE:    Referring Provider:  Dr. Tidwell    Chief complaint: Chest pain, weight loss, constipation     Subjective .     History of present illness: Patient is a 44-year-old female with history of ADHD, anxiety/depression, bipolar, COPD, PTSD, hepatitis C (treatment naïve ), substance abuse on Suboxone, and cholecystectomy who presents with complaints of weakness, weight loss, and chest pain.  The patient reports that she has been having chest pain for approximately the past week.  She is unable to describe the pain, but states that it is intermittent.  Unable to identify any exacerbating or alleviating factors.  She does report nausea that worsens with eating.  Also reports occasional vomiting.  Denies any hematemesis or coffee-ground emesis.  States that she does seem to have early satiety and always feels full.  She states that she has been struggling with constipation and reports that her last bowel movement was 3 weeks ago.  She denies any recent change in her Suboxone dose.  No bright red blood per rectum or melena.  She does report a weight loss of approximately 40 pounds in the past 3 months.  Of note,  the patient does have a history of hepatitis C which is treatment naïve.  She reports that her last drug use was 4 years ago.      Endo History:  2015 EGD/colonoscopy (Dr. Valentino) -gastritis, empiric dilation to 52 [place], normal colonoscopy, random colon biopsies negative    Past Medical History:  Past Medical History:   Diagnosis Date   • ADHD    • Anxiety    • Bipolar 1 disorder (CMS/HCC)    • COPD (chronic obstructive pulmonary disease) (CMS/HCC)    • Depression    • Hep C w/o coma, chronic (CMS/HCC)    • PTSD (post-traumatic stress disorder)    • Substance abuse (CMS/HCC)    • Vitamin D deficiency        Past Surgical History:  Past Surgical History:   Procedure Laterality Date   •  SECTION     • CHOLECYSTECTOMY     • DILATATION AND CURETTAGE      x2   • TONSILLECTOMY         Social History:  Social History     Tobacco Use   • Smoking status: Current Every Day Smoker     Packs/day: 1.00     Types: Cigarettes   Substance Use Topics   • Alcohol use: No     Frequency: Never   • Drug use: No       Family History:  Family History   Problem Relation Age of Onset   • Heart disease Mother    • Heart disease Maternal Grandmother    • Heart disease Maternal Grandfather        Medications:  Medications Prior to Admission   Medication Sig Dispense Refill Last Dose   • buprenorphine-naloxone (SUBOXONE) 8-2 MG per SL tablet Place 0.25 tablets under the tongue 4 (Four) Times a Day. (0.5 tab morning, 0.5 tab noon, 0.5 tab afternoon, 0.5 tab bed)   3/2/2020 at 12:00   • busPIRone (BUSPAR) 30 MG tablet Take 30 mg by mouth 2 (Two) Times a Day.   3/2/2020 at Unknown time   • docusate sodium (COLACE) 100 MG capsule Take 100 mg by mouth 2 (Two) Times a Day.   3/2/2020 at Unknown time   • fluticasone (FLONASE) 50 MCG/ACT nasal spray 2 sprays into the nostril(s) as directed by provider Daily.   3/2/2020 at Unknown time   • lamoTRIgine (LAMICTAL) 100 MG tablet Take 200 mg by mouth every night at bedtime.   3/1/2020 at  Unknown time   • OLANZapine (zyPREXA) 10 MG tablet Take 20 mg by mouth Every Night.   3/1/2020 at Unknown time   • polyethylene glycol (MIRALAX) packet Take 17 g by mouth Daily As Needed (constipation).   Past Week at Unknown time   • ramelteon (ROZEREM) 8 MG tablet Take 8 mg by mouth Every Night.   3/1/2020 at Unknown time   • umeclidinium-vilanterol (ANORO ELLIPTA) 62.5-25 MCG/INH aerosol powder  inhaler Inhale 1 puff Daily.   3/2/2020 at Unknown time   • venlafaxine XR (EFFEXOR-XR) 150 MG 24 hr capsule Take 150 mg by mouth Daily.   3/2/2020 at Unknown time       Scheduled Meds:  buprenorphine-naloxone 0.5 tablet Sublingual 4x Daily   busPIRone 30 mg Oral Q12H   docusate sodium 100 mg Oral BID   fluticasone 2 spray Nasal Daily   ipratropium-albuterol 3 mL Nebulization Q8H - RT   lamoTRIgine 200 mg Oral Nightly   OLANZapine 20 mg Oral Nightly   polyethylene glycol 17 g Oral Daily   potassium chloride 40 mEq Oral Q4H   sodium chloride 10 mL Intravenous Q12H   venlafaxine  mg Oral Daily     Continuous Infusions:   PRN Meds:.•  acetaminophen **OR** acetaminophen **OR** acetaminophen  •  aluminum-magnesium hydroxide-simethicone  •  bisacodyl  •  bisacodyl  •  Calcium Gluconate-NaCl **AND** calcium gluconate **AND** Calcium, Ionized  •  magnesium hydroxide  •  magnesium sulfate **OR** magnesium sulfate in D5W 1g/100mL (PREMIX)  •  melatonin  •  ondansetron **OR** ondansetron  •  [COMPLETED] Insert peripheral IV **AND** sodium chloride  •  sodium chloride  •  zolpidem    ALLERGIES:  Erythromycin base and Lithium    ROS:  Review of Systems   Constitutional: Positive for unexpected weight change. Negative for chills and fever.   HENT: Negative for sore throat and trouble swallowing.    Respiratory: Negative for cough and shortness of breath.    Cardiovascular: Positive for chest pain. Negative for leg swelling.   Gastrointestinal: Positive for abdominal pain, constipation, nausea and vomiting. Negative for abdominal  distention, anal bleeding, blood in stool, diarrhea and rectal pain.   Genitourinary: Negative for dysuria and hematuria.   Musculoskeletal: Negative for back pain and joint swelling.   Skin: Negative for color change and rash.   Neurological: Negative for dizziness and light-headedness.   Psychiatric/Behavioral: Negative for confusion. The patient is not nervous/anxious.        Objective     Vital Signs:   Temp:  [97.3 °F (36.3 °C)-98.3 °F (36.8 °C)] 98.3 °F (36.8 °C)  Heart Rate:  [] 89  Resp:  [14-17] 17  BP: ()/(58-78) 107/69    Physical Exam:      General Appearance:    Awake and alert, in no acute distress   Head:    Normocephalic, without obvious abnormality, atraumatic   Eyes:            Conjunctivae normal, anicteric sclera   Ears:    Ears appear intact with no abnormalities noted   Throat:   No oral lesions, no thrush, oral mucosa moist   Neck:   No adenopathy, supple, no thyromegaly, no JVD   Lungs:     Clear to auscultation bilaterally, respirations regular, even and unlabored    Heart:    Regular rhythm and normal rate, normal S1 and S2   Chest Wall:    No abnormalities observed   Abdomen:     Hypoactive bowel sounds, soft, non-tender, no rebound or guarding, non-distended    Rectal:     Deferred   Extremities:   Moves all extremities well, no edema, no cyanosis, no             redness   Pulses:   Pulses palpable and equal bilaterally   Skin:   No bleeding, bruising or rash, no jaundice   Lymph nodes:   No palpable adenopathy   Neurologic:   Cranial nerves 2 - 12 grossly intact, no asterixis, sensation intact       Results Review:   I reviewed the patient's labs and imaging.  Lab Results (last 24 hours)     Procedure Component Value Units Date/Time    CK [[id number]]  (Normal) Collected:  03/03/20 0344    Specimen:  Blood Updated:  03/03/20 0508     Creatine Kinase 39 U/L      Comment: Result checked        Sedimentation Rate [[id number]]  (Normal) Collected:  03/03/20 0344    Specimen:  Blood  Updated:  03/03/20 0501     Sed Rate 12 mm/hr     Phosphorus [[id number]]  (Abnormal) Collected:  03/03/20 0344    Specimen:  Blood Updated:  03/03/20 0457     Phosphorus 1.5 mg/dL     Troponin [[id number]]  (Normal) Collected:  03/03/20 0344    Specimen:  Blood Updated:  03/03/20 0456     Troponin T <0.010 ng/mL     Narrative:       Troponin T Reference Range:  <= 0.03 ng/mL-   Negative for AMI  >0.03 ng/mL-     Abnormal for myocardial necrosis.  Clinicians would have to utilize clinical acumen, EKG, Troponin and serial changes to determine if it is an Acute Myocardial Infarction or myocardial injury due to an underlying chronic condition.       Results may be falsely decreased if patient taking Biotin.      Ferritin [[id number]]  (Abnormal) Collected:  03/03/20 0344    Specimen:  Blood Updated:  03/03/20 0456     Ferritin 219.00 ng/mL     Narrative:       Results may be falsely decreased if patient taking Biotin.      TSH [[id number]]  (Abnormal) Collected:  03/03/20 0344    Specimen:  Blood Updated:  03/03/20 0456     TSH 6.560 uIU/mL     Comprehensive Metabolic Panel [[id number]]  (Abnormal) Collected:  03/03/20 0344    Specimen:  Blood Updated:  03/03/20 0453     Glucose 107 mg/dL      BUN 12 mg/dL      Creatinine 0.85 mg/dL      Sodium 141 mmol/L      Potassium 2.8 mmol/L      Chloride 120 mmol/L      CO2 14.0 mmol/L      Calcium 7.7 mg/dL      Total Protein 5.5 g/dL      Albumin 3.30 g/dL      ALT (SGPT) 25 U/L      AST (SGOT) 21 U/L      Alkaline Phosphatase 94 U/L      Total Bilirubin 0.2 mg/dL      eGFR Non African Amer 73 mL/min/1.73      Globulin 2.2 gm/dL      A/G Ratio 1.5 g/dL      BUN/Creatinine Ratio 14.1     Anion Gap 7.0 mmol/L     Narrative:       GFR Normal >60  Chronic Kidney Disease <60  Kidney Failure <15      Uric Acid [[id number]]  (Abnormal) Collected:  03/03/20 0344    Specimen:  Blood Updated:  03/03/20 0453     Uric Acid 2.0 mg/dL     Lipid Panel [[id number]]  (Abnormal) Collected:  03/03/20  0344    Specimen:  Blood Updated:  03/03/20 0453     Total Cholesterol 82 mg/dL      Triglycerides 63 mg/dL      HDL Cholesterol 30 mg/dL      LDL Cholesterol  39 mg/dL      VLDL Cholesterol 12.6 mg/dL      LDL/HDL Ratio 1.31    Narrative:       Cholesterol Reference Ranges  ([place] Department of Health and Human Services ATP III Classifications)    Desirable          <200 mg/dL  Borderline High    200-239 mg/dL  High Risk          >240 mg/dL      Triglyceride Reference Ranges  ([place].S. Department of Health and Human Services ATP III Classifications)    Normal           <150 mg/dL  Borderline High  150-199 mg/dL  High             200-499 mg/dL  Very High        >500 mg/dL    HDL Reference Ranges  (U.S. Department of Health and Human Services ATP III Classifcations)    Low     <40 mg/dl (major risk factor for CHD)  High    >60 mg/dl ('negative' risk factor for CHD)        LDL Reference Ranges  (U.S. Department of Health and Human Services ATP III Classifcations)    Optimal          <100 mg/dL  Near Optimal     100-129 mg/dL  Borderline High  130-159 mg/dL  High             160-189 mg/dL  Very High        >189 mg/dL    C-reactive Protein [[id number]]  (Normal) Collected:  03/03/20 0344    Specimen:  Blood Updated:  03/03/20 0453     C-Reactive Protein 0.05 mg/dL     Magnesium [[id number]]  (Normal) Collected:  03/03/20 0344    Specimen:  Blood Updated:  03/03/20 0452     Magnesium 2.2 mg/dL     CBC & Differential [[id number]] Collected:  03/03/20 0344    Specimen:  Blood Updated:  03/03/20 0417    Narrative:       The following orders were created for panel order CBC & Differential.  Procedure                               Abnormality         Status                     ---------                               -----------         ------                     CBC Auto Differential[[id number]]        Abnormal            Final result                 Please view results for these tests on the individual orders.    CBC Auto  Differential [[id number]]  (Abnormal) Collected:  03/03/20 0344    Specimen:  Blood Updated:  03/03/20 0417     WBC 9.60 10*3/mm3      RBC 3.19 10*6/mm3      Hemoglobin 8.9 g/dL      Hematocrit 27.0 %      MCV 84.7 fL      MCH 27.8 pg      MCHC 32.7 g/dL      RDW 16.3 %      RDW-SD 48.6 fl      MPV 8.8 fL      Platelets 243 10*3/mm3      Neutrophil % 46.1 %      Lymphocyte % 47.4 %      Monocyte % 5.3 %      Eosinophil % 0.7 %      Basophil % 0.5 %      Neutrophils, Absolute 4.40 10*3/mm3      Lymphocytes, Absolute 4.60 10*3/mm3      Monocytes, Absolute 0.50 10*3/mm3      Eosinophils, Absolute 0.10 10*3/mm3      Basophils, Absolute 0.00 10*3/mm3      nRBC 0.2 /100 WBC     Vitamin B12 [[id number]] Collected:  03/03/20 0344    Specimen:  Blood Updated:  03/03/20 0411    Hemoglobin A1c [[id number]] Collected:  03/03/20 0344    Specimen:  Blood Updated:  03/03/20 0407    Calcium, Ionized [[id number]]  (Abnormal) Collected:  03/02/20 2014    Specimen:  Blood from Arm, Right Updated:  03/02/20 2106     Ionized Calcium 1.34 mmol/L     Troponin [[id number]]  (Normal) Collected:  03/02/20 2014    Specimen:  Blood from Arm, Right Updated:  03/02/20 2105     Troponin T <0.010 ng/mL     Narrative:       Troponin T Reference Range:  <= 0.03 ng/mL-   Negative for AMI  >0.03 ng/mL-     Abnormal for myocardial necrosis.  Clinicians would have to utilize clinical acumen, EKG, Troponin and serial changes to determine if it is an Acute Myocardial Infarction or myocardial injury due to an underlying chronic condition.       Results may be falsely decreased if patient taking Biotin.      Potassium [[id number]]  (Abnormal) Collected:  03/02/20 2014    Specimen:  Blood from Arm, Right Updated:  03/02/20 2102     Potassium 2.6 mmol/L     Magnesium [[id number]]  (Normal) Collected:  03/02/20 2014    Specimen:  Blood from Arm, Right Updated:  03/02/20 2101     Magnesium 2.1 mg/dL     Urine Drug Screen - Urine, Clean Catch [[id number]]  (Normal)  Collected:  03/02/20 1438    Specimen:  Urine, Clean Catch Updated:  03/02/20 1516     Amphet/Methamphet, Screen Negative     Barbiturates Screen, Urine Negative     Benzodiazepine Screen, Urine Negative     Cocaine Screen, Urine Negative     Opiate Screen Negative     THC, Screen, Urine Negative     Methadone Screen, Urine Negative     Oxycodone Screen, Urine Negative    Narrative:       Negative Thresholds For Drugs Screened:     Amphetamines               500 ng/ml   Barbiturates               200 ng/ml   Benzodiazepines            100 ng/ml   Cocaine                    300 ng/ml   Methadone                  300 ng/ml   Opiates                    300 ng/ml   Oxycodone                  100 ng/ml   THC                        50 ng/ml    The Normal Value for all drugs tested is negative. This report includes final unconfirmed screening results to be used for medical treatment purposes only. Unconfirmed results must not be used for non-medical purposes such as employment or legal testing. Clinical consideration should be applied to any drug of abuse test, particulary when unconfirmed results are used.  All urine drugs of abuse requests without chain of custody are for medical screening purposes only.  False positives are possible.      Comprehensive Metabolic Panel [[id number]]  (Abnormal) Collected:  03/02/20 1424    Specimen:  Blood Updated:  03/02/20 1501     Glucose 141 mg/dL      BUN 16 mg/dL      Creatinine 1.14 mg/dL      Sodium 139 mmol/L      Potassium 2.4 mmol/L      Chloride 114 mmol/L      CO2 14.0 mmol/L      Calcium 10.1 mg/dL      Total Protein 6.6 g/dL      Albumin 4.10 g/dL      ALT (SGPT) 29 U/L      AST (SGOT) 19 U/L      Alkaline Phosphatase 114 U/L      Total Bilirubin 0.2 mg/dL      eGFR Non African Amer 52 mL/min/1.73      Globulin 2.5 gm/dL      A/G Ratio 1.6 g/dL      BUN/Creatinine Ratio 14.0     Anion Gap 11.0 mmol/L     Narrative:       GFR Normal >60  Chronic Kidney Disease <60  Kidney  Failure <15      Urinalysis With Microscopic If Indicated (No Culture) - Urine, Clean Catch [[id number]]  (Abnormal) Collected:  03/02/20 1438    Specimen:  Urine, Clean Catch Updated:  03/02/20 1501     Color, UA Yellow     Appearance, UA Clear     pH, UA 6.5     Specific Gravity, UA 1.016     Glucose, UA Negative     Ketones, UA Negative     Bilirubin, UA Negative     Blood, UA Negative     Protein,  mg/dL (2+)     Leuk Esterase, UA Trace     Nitrite, UA Negative     Urobilinogen, UA 1.0 E.U./dL    Urinalysis, Microscopic Only - Urine, Clean Catch [[id number]]  (Abnormal) Collected:  03/02/20 1438    Specimen:  Urine, Clean Catch Updated:  03/02/20 1501     RBC, UA 0-2 /HPF      WBC, UA 6-12 /HPF      Bacteria, UA None Seen /HPF      Squamous Epithelial Cells, UA 3-6 /HPF      Hyaline Casts, UA 3-6 /LPF      Methodology Automated Microscopy    Magnesium [[id number]]  (Normal) Collected:  03/02/20 1424    Specimen:  Blood Updated:  03/02/20 1458     Magnesium 2.2 mg/dL     Troponin [[id number]]  (Normal) Collected:  03/02/20 1424    Specimen:  Blood Updated:  03/02/20 1458     Troponin T <0.010 ng/mL     Narrative:       Troponin T Reference Range:  <= 0.03 ng/mL-   Negative for AMI  >0.03 ng/mL-     Abnormal for myocardial necrosis.  Clinicians would have to utilize clinical acumen, EKG, Troponin and serial changes to determine if it is an Acute Myocardial Infarction or myocardial injury due to an underlying chronic condition.       Results may be falsely decreased if patient taking Biotin.      CBC & Differential [[id number]] Collected:  03/02/20 1424    Specimen:  Blood Updated:  03/02/20 1437    Narrative:       The following orders were created for panel order CBC & Differential.  Procedure                               Abnormality         Status                     ---------                               -----------         ------                     CBC Auto Differential[[id number]]        Abnormal             Final result                 Please view results for these tests on the individual orders.    CBC Auto Differential [[id number]]  (Abnormal) Collected:  03/02/20 1424    Specimen:  Blood Updated:  03/02/20 1437     WBC 9.20 10*3/mm3      RBC 3.73 10*6/mm3      Hemoglobin 10.4 g/dL      Hematocrit 31.4 %      MCV 84.1 fL      MCH 27.9 pg      MCHC 33.2 g/dL      RDW 15.9 %      RDW-SD 46.4 fl      MPV 8.6 fL      Platelets 301 10*3/mm3      Neutrophil % 56.7 %      Lymphocyte % 36.3 %      Monocyte % 6.0 %      Eosinophil % 0.5 %      Basophil % 0.5 %      Neutrophils, Absolute 5.20 10*3/mm3      Lymphocytes, Absolute 3.30 10*3/mm3      Monocytes, Absolute 0.50 10*3/mm3      Eosinophils, Absolute 0.00 10*3/mm3      Basophils, Absolute 0.00 10*3/mm3      nRBC 0.1 /100 WBC           Imaging Results (Last 24 Hours)     Procedure Component Value Units Date/Time    CT Chest With Contrast [[id number]] Resulted:  03/03/20 1105     Updated:  03/03/20 1054    CT Abdomen Pelvis Without Contrast [[id number]] Collected:  03/02/20 1509     Updated:  03/02/20 1517    Narrative:       CT ABDOMEN PELVIS WO CONTRAST-     Date of Exam: 3/2/2020 2:57 PM     Indication: constipation.  ,  Umbilical pain, nausea vomiting      Comparison Exams: 2/24/2020     Technique: Multiple axial images were obtained from the lung bases  through the kidneys without IV contrast. Coronal and sagittal  reconstructions were performed.  Automated exposure control and iterative reconstruction methods were  used.     FINDINGS:  There are small areas of groundglass opacity in both lung bases which  may be infectious or inflammatory in etiology.  No pleural effusion  identified.  No definite free intraperitoneal air.  Liver, pancreas, and  spleen appear within normal limits.  Bilateral adrenal glands are  normal.  No definite renal or ureteral stone or hydronephrosis.  There  is a prominent extrarenal pelvis on the right.  Patient is status post  cholecystectomy.   There is enlargement of the common bile duct most  likely related to prior cholecystectomy.  No obstructing stone or mass  identified.  The unopacified upper GI tract is within normal limits.   Colon is diffusely distended and contains high density stool which  appears slightly Improved from the prior exam.  For example the cecum  now measures 6.3 cm, previously 9.4 cm.     Pelvis: The urinary bladder appears within normal limits.  The uterus  and ovaries are within normal limits.  No pelvic or inguinal adenopathy  identified.  There is no free intraperitoneal fluid.  No lytic or  sclerotic bony lesions are seen.         Impression:          1.  Large amount of high density stool throughout the colon which is  distended but improved from prior study as detailed above.  Findings  would be compatible with patient's history of constipation.  No evidence  of bowel obstruction.  2.  Small areas of groundglass airspace disease in both lung bases which  is nonspecific and may be infectious or inflammatory in etiology.     Electronically Signed By-Tony Leon On:3/2/2020 3:14 PM  This report was finalized on [id number] by  Tony Leon, .    XR Chest 1 View [[id number]] Collected:  03/02/20 1431     Updated:  03/02/20 1435    Narrative:       DATE OF EXAM:  3/2/2020 2:15 PM     PROCEDURE:  XR CHEST 1 VW-     INDICATIONS:  40 pound weight loss in 3 months. COPD. Smoker. Shortness of breath  today. Weakness.       COMPARISON:  AP portable chest 02/24/2020.     TECHNIQUE:   Single radiographic view of the chest was obtained.     FINDINGS:  No Acute airspace disease. Normal heart size. Benign calcified left  hilar lymph node. No acute osseous abnormality. No pleural effusion or  pneumothorax.       Impression:       No acute chest findings.     Electronically Signed By-Dr. Heather Romo MD On:3/2/2020 2:33 PM  This report was finalized on [id number] by Dr. Heather Romo MD.             ASSESSMENT:  -Chest  pain  -Constipation -likely secondary to chronic narcotic use  -Unintentional weight loss  -Nausea/vomiting -likely due to constipation  -Chronic hepatitis C -treatment naïve  -Hypokalemia  -Normocytic anemia  -ADHD/anxiety/depression/bipolar/PTSD  -COPD  -History of substance abuse on Suboxone -reports last drug use 4 years ago  -History of cholecystectomy      PLAN:  Patient presents with complaints of chest pain, weakness, and unintentional weight loss of 40 pounds in the past 3 months.  CT abdomen/pelvis without contrast on admission shows a large amount of stool throughout the colon.  We will plan bowel purge with magnesium citrate, Relistor, and Reglan.  Also plan EGD/colonoscopy tomorrow to further evaluate the patient's complaints.  Clear liquid diet.  N.p.o. following bowel prep.  Check B12 level as patient has a reported history of B12 deficiency.  Patient does report a history of hepatitis C.  We will check HCV RNA and genotype.  Could consider outpatient treatment following discharge.  Start PPI.  Antiemetics/analgesics as needed.  Supportive care.    I discussed the patients findings and my recommendations with the patient.  I will discuss the case with Dr. Lovell and change the plan accordingly.  MAXIMUS Osorio  03/03/20  11:10 AM                Electronically signed by Amrita Santos APRN at 03/03/20 1593

## 2020-03-03 NOTE — PROGRESS NOTES
Discharge Planning Assessment   Andrzej     Patient Name: Elizabeth Gómez  MRN: [id number]  Today's Date: 3/3/2020    Admit Date: 3/2/2020    Discharge Needs Assessment     Row Name 03/03/20 1537       Living Environment    Lives With  alone;child(juan), dependent    Name(s) of Who Lives With Patient  has two dependent children, 12 and 10 years old. Patients mother is keeping her children while in the hospital     Current Living Arrangements  home/apartment/condo    Primary Care Provided by  self    Provides Primary Care For  child(juan)    Family Caregiver if Needed  parent(s)    Quality of Family Relationships  helpful;involved    Able to Return to Prior Arrangements  yes       Resource/Environmental Concerns    Resource/Environmental Concerns  none    Transportation Concerns  car, none       Transition Planning    Patient/Family Anticipates Transition to  home with family    Patient/Family Anticipated Services at Transition  none    Transportation Anticipated  family or friend will provide       Discharge Needs Assessment    Readmission Within the Last 30 Days  no previous admission in last 30 days    Concerns to be Addressed  denies needs/concerns at this time;discharge planning    Equipment Currently Used at Home  nebulizer    Anticipated Changes Related to Illness  none    Equipment Needed After Discharge  none    Provided Post Acute Provider List?  Refused        Discharge Plan     Row Name 03/03/20 1538       Plan    Plan  Anticipate routine home     Patient/Family in Agreement with Plan  yes    Plan Comments  DC barriers: EGD and colonoscopy tomorrow           Expected Discharge Date and Time     Expected Discharge Date Expected Discharge Time    Mar 5, 2020         Demographic Summary     Row Name 03/03/20 1537       General Information    Admission Type  inpatient    Arrived From  emergency department    Referral Source  admission list    Reason for Consult  discharge planning    Preferred Language  English      Used During This Interaction  no        Functional Status     Row Name 03/03/20 1537       Functional Status    Usual Activity Tolerance  good    Current Activity Tolerance  good       Functional Status, IADL    Medications  independent    Meal Preparation  independent    Housekeeping  independent    Laundry  independent    Shopping  independent       Mental Status    General Appearance WDL  WDL       Mental Status Summary    Recent Changes in Mental Status/Cognitive Functioning  no changes            Lelo Escobedo, RN

## 2020-03-03 NOTE — PLAN OF CARE
Patient alert and oriented. No complaints of chest pain, however, she does complain of some nausea and feeling like food and pills are getting stuck in her throat. Patient has had a bowel movement. Plan for an EGD and Colonoscopy tomorrow. Patient drinking her moviprep. No other complaints or concerns were noted. Will continue to monitor.

## 2020-03-03 NOTE — PROGRESS NOTES
"      [facility] Medicine Services Daily Progress Note      Hospitalist Team  LOS 1 days      Patient Care Team:  Blanca Welch APRN as PCP - General (Nurse Practitioner)    Patient Location: 232/1      Subjective   Subjective     Chief Complaint / Subjective  Chief Complaint   Patient presents with   • Weakness - Generalized         Brief Synopsis of Hospital Course/HPI   44-year-old female PMH of remote history of substance abuse, currently clean and on Suboxone for 2 years, constipation tendency, hepatitis C, gradual weight loss  , Presenting to ED with a complaint of severe constipation for 3 weeks associated with abdominal pain and discomfort.  Was tried on several laxatives at home and one-time enema without effect.  Never had this worst  constipation in the past.  On admission potassium was 2.4.  He attributes gradual weight loss to poor oral intake due to early satiety and gastric fullness A/W nausea and vomiting.          Date::    3/3; seen by GI . Planned for EGD and colonoscopy. Bowel preparation today.   CT chest, abdomen and pelvis were unremarkable    ROS  Positive for constipation, abdominal pain and discomfort, chronic nausea and vomiting, easy gastric fullness the rest of the 14 systems were reviewed unremarkable.    Objective   Objective      Vital Signs  Temp:  [97.5 °F (36.4 °C)-98.3 °F (36.8 °C)] 97.5 °F (36.4 °C)  Heart Rate:  [80-89] 88  Resp:  [16-18] 17  BP: ()/(58-78) 99/67  Oxygen Therapy  SpO2: 100 %  Pulse Oximetry Type: Intermittent  Device (Oxygen Therapy): room air  Device (Oxygen Therapy): room air  Flowsheet Rows      First Filed Value   Admission Height  162.6 cm (64\") Documented at 03/02/2020 1312   Admission Weight  43.1 kg (95 lb 0.3 oz) Documented at 03/02/2020 1312        Intake & Output (last 3 days)       02/29 0701 - 03/01 0700 03/01 0701 - 03/02 0700 03/02 0701 - 03/03 0700 03/03 0701 - 03/04 0700    P.O.    240    IV Piggyback   500     " Total Intake(mL/kg)   500 (11.2) 240 (5.4)    Net   +500 +240            Urine Unmeasured Occurrence    3 x    Stool Unmeasured Occurrence    2 x        Lines, Drains & Airways    Active LDAs     Name:   Placement date:   Placement time:   Site:   Days:    Peripheral IV 03/02/20 1425 Left Arm   03/02/20 1425    Arm   1                  Physical Exam:    Physical Exam    Constitutional: She is oriented to person, place, and time. She appears well-developed and well-nourished. No distress.   HENT:   Head: Normocephalic and atraumatic.   Nose: Nose normal.   Eyes: Pupils are equal, round, and reactive to light. Conjunctivae and EOM are normal.   Neck: Normal range of motion.   Cardiovascular: Normal rate, regular rhythm, normal heart sounds and intact distal pulses.   Pulmonary/Chest: Effort normal and breath sounds normal. No stridor. No respiratory distress.   Abdominal: Soft. Bowel sounds are normal. She exhibits distension. There is vague R side tenderness. There is no guarding.   Musculoskeletal: Normal range of motion. She exhibits no edema, tenderness or deformity.   Neurological: She is alert and oriented to person, place, and time. No cranial nerve deficit.   Skin: Skin is warm and dry. No rash noted. She is not diaphoretic. No erythema.   Psychiatric: She has a normal mood and affect. Her behavior is normal.   Nursing note and vitals reviewed.      Procedures:    Procedure(s):  COLONOSCOPY  ESOPHAGOGASTRODUODENOSCOPY          Results Review:     I reviewed the patient's new clinical results.      Lab Results (last 24 hours)     Procedure Component Value Units Date/Time    Hemoglobin A1c [[id number]]  (Normal) Collected:  03/03/20 0344    Specimen:  Blood Updated:  03/03/20 1223     Hemoglobin A1C 4.9 %     Narrative:       Hemoglobin A1C Reference Range:    <5.7 %        Normal  5.7-6.4 %     Increased risk for diabetes  > 6.4 %        Diabetes       These guidelines have been recommended by the American  Diabetic Association for Hgb A1c.      The following 2010 guidelines have been recommended by the American Diabetes Association for Hemoglobin A1c.    HBA1c 5.7-6.4% Increased risk for future diabetes (pre-diabetes)  HBA1c     >6.4% Diabetes      Vitamin B12 [[id number]]  (Normal) Collected:  03/03/20 0344    Specimen:  Blood Updated:  03/03/20 1125     Vitamin B-12 513 pg/mL     Narrative:       Results may be falsely increased if patient taking Biotin.      CK [[id number]]  (Normal) Collected:  03/03/20 0344    Specimen:  Blood Updated:  03/03/20 0508     Creatine Kinase 39 U/L      Comment: Result checked        Sedimentation Rate [[id number]]  (Normal) Collected:  03/03/20 0344    Specimen:  Blood Updated:  03/03/20 0501     Sed Rate 12 mm/hr     Phosphorus [[id number]]  (Abnormal) Collected:  03/03/20 0344    Specimen:  Blood Updated:  03/03/20 0457     Phosphorus 1.5 mg/dL     Troponin [[id number]]  (Normal) Collected:  03/03/20 0344    Specimen:  Blood Updated:  03/03/20 0456     Troponin T <0.010 ng/mL     Narrative:       Troponin T Reference Range:  <= 0.03 ng/mL-   Negative for AMI  >0.03 ng/mL-     Abnormal for myocardial necrosis.  Clinicians would have to utilize clinical acumen, EKG, Troponin and serial changes to determine if it is an Acute Myocardial Infarction or myocardial injury due to an underlying chronic condition.       Results may be falsely decreased if patient taking Biotin.      Ferritin [[id number]]  (Abnormal) Collected:  03/03/20 0344    Specimen:  Blood Updated:  03/03/20 0456     Ferritin 219.00 ng/mL     Narrative:       Results may be falsely decreased if patient taking Biotin.      TSH [[id number]]  (Abnormal) Collected:  03/03/20 0344    Specimen:  Blood Updated:  03/03/20 0456     TSH 6.560 uIU/mL     Comprehensive Metabolic Panel [[id number]]  (Abnormal) Collected:  03/03/20 0344    Specimen:  Blood Updated:  03/03/20 0453     Glucose 107 mg/dL      BUN 12 mg/dL      Creatinine 0.85  mg/dL      Sodium 141 mmol/L      Potassium 2.8 mmol/L      Chloride 120 mmol/L      CO2 14.0 mmol/L      Calcium 7.7 mg/dL      Total Protein 5.5 g/dL      Albumin 3.30 g/dL      ALT (SGPT) 25 U/L      AST (SGOT) 21 U/L      Alkaline Phosphatase 94 U/L      Total Bilirubin 0.2 mg/dL      eGFR Non African Amer 73 mL/min/1.73      Globulin 2.2 gm/dL      A/G Ratio 1.5 g/dL      BUN/Creatinine Ratio 14.1     Anion Gap 7.0 mmol/L     Narrative:       GFR Normal >60  Chronic Kidney Disease <60  Kidney Failure <15      Uric Acid [[id number]]  (Abnormal) Collected:  03/03/20 0344    Specimen:  Blood Updated:  03/03/20 0453     Uric Acid 2.0 mg/dL     Lipid Panel [[id number]]  (Abnormal) Collected:  03/03/20 0344    Specimen:  Blood Updated:  03/03/20 0453     Total Cholesterol 82 mg/dL      Triglycerides 63 mg/dL      HDL Cholesterol 30 mg/dL      LDL Cholesterol  39 mg/dL      VLDL Cholesterol 12.6 mg/dL      LDL/HDL Ratio 1.31    Narrative:       Cholesterol Reference Ranges  (U.S. Department of Health and Human Services ATP III Classifications)    Desirable          <200 mg/dL  Borderline High    200-239 mg/dL  High Risk          >240 mg/dL      Triglyceride Reference Ranges  (U.S. Department of Health and Human Services ATP III Classifications)    Normal           <150 mg/dL  Borderline High  150-199 mg/dL  High             200-499 mg/dL  Very High        >500 mg/dL    HDL Reference Ranges  (U.S. Department of Health and Human Services ATP III Classifcations)    Low     <40 mg/dl (major risk factor for CHD)  High    >60 mg/dl ('negative' risk factor for CHD)        LDL Reference Ranges  (U.S. Department of Health and Human Services ATP III Classifcations)    Optimal          <100 mg/dL  Near Optimal     100-129 mg/dL  Borderline High  130-159 mg/dL  High             160-189 mg/dL  Very High        >189 mg/dL    C-reactive Protein [[id number]]  (Normal) Collected:  03/03/20 0344    Specimen:  Blood Updated:  03/03/20 0453      C-Reactive Protein 0.05 mg/dL     Magnesium [[id number]]  (Normal) Collected:  03/03/20 0344    Specimen:  Blood Updated:  03/03/20 0452     Magnesium 2.2 mg/dL     CBC & Differential [[id number]] Collected:  03/03/20 0344    Specimen:  Blood Updated:  03/03/20 0417    Narrative:       The following orders were created for panel order CBC & Differential.  Procedure                               Abnormality         Status                     ---------                               -----------         ------                     CBC Auto Differential[[id number]]        Abnormal            Final result                 Please view results for these tests on the individual orders.    CBC Auto Differential [[id number]]  (Abnormal) Collected:  03/03/20 0344    Specimen:  Blood Updated:  03/03/20 0417     WBC 9.60 10*3/mm3      RBC 3.19 10*6/mm3      Hemoglobin 8.9 g/dL      Hematocrit 27.0 %      MCV 84.7 fL      MCH 27.8 pg      MCHC 32.7 g/dL      RDW 16.3 %      RDW-SD 48.6 fl      MPV 8.8 fL      Platelets 243 10*3/mm3      Neutrophil % 46.1 %      Lymphocyte % 47.4 %      Monocyte % 5.3 %      Eosinophil % 0.7 %      Basophil % 0.5 %      Neutrophils, Absolute 4.40 10*3/mm3      Lymphocytes, Absolute 4.60 10*3/mm3      Monocytes, Absolute 0.50 10*3/mm3      Eosinophils, Absolute 0.10 10*3/mm3      Basophils, Absolute 0.00 10*3/mm3      nRBC 0.2 /100 WBC     Calcium, Ionized [[id number]]  (Abnormal) Collected:  03/02/20 2014    Specimen:  Blood from Arm, Right Updated:  03/02/20 2106     Ionized Calcium 1.34 mmol/L     Troponin [[id number]]  (Normal) Collected:  03/02/20 2014    Specimen:  Blood from Arm, Right Updated:  03/02/20 2105     Troponin T <0.010 ng/mL     Narrative:       Troponin T Reference Range:  <= 0.03 ng/mL-   Negative for AMI  >0.03 ng/mL-     Abnormal for myocardial necrosis.  Clinicians would have to utilize clinical acumen, EKG, Troponin and serial changes to determine if it is an Acute Myocardial  Infarction or myocardial injury due to an underlying chronic condition.       Results may be falsely decreased if patient taking Biotin.      Potassium [[id number]]  (Abnormal) Collected:  03/02/20 2014    Specimen:  Blood from Arm, Right Updated:  03/02/20 2102     Potassium 2.6 mmol/L     Magnesium [[id number]]  (Normal) Collected:  03/02/20 2014    Specimen:  Blood from Arm, Right Updated:  03/02/20 2101     Magnesium 2.1 mg/dL         Hemoglobin A1C   Date Value Ref Range Status   03/03/2020 4.9 3.5 - 5.6 % Final               Lab Results   Component Value Date    LIPASE 190 (H) 02/24/2020     Lab Results   Component Value Date    CHOL 82 03/03/2020    TRIG 63 03/03/2020    HDL 30 (L) 03/03/2020    LDL 39 03/03/2020       No results found for: INTRAOP, PREDX, FINALDX, COMDX    Microbiology Results (last 10 days)     Procedure Component Value - Date/Time    Urine Culture - Urine, Urine, Clean Catch [[id number]]  (Normal) Collected:  02/24/20 1517    Lab Status:  Final result Specimen:  Urine, Clean Catch Updated:  02/26/20 0347     Urine Culture No growth          ECG/EMG Results (most recent)     Procedure Component Value Units Date/Time    ECG 12 Lead [[id number]] Resulted:  03/02/20 1536     Updated:  03/02/20 1536    ECG 12 Lead [[id number]] Collected:  03/02/20 1317     Updated:  03/02/20 1536    Narrative:       HEART RATE= 94  bpm  RR Interval= 636  ms  TN Interval= 150  ms  P Horizontal Axis= 12  deg  P Front Axis= 79  deg  QRSD Interval= 99  ms  QT Interval= 405  ms  QRS Axis= 43  deg  T Wave Axis= 239  deg  - ABNORMAL ECG -  Sinus rhythm  Repol abnrm suggests ischemia, diffuse leads  When compared with ECG of 24-Feb-2020 13:13:51,  Significant repolarization change  Electronically Signed By: Reno Cortez (Huber) 02-Mar-2020 15:36:39  Date and Time of Study: 2020-03-02 13:17:19          Results for orders placed during the hospital encounter of 08/23/19   Duplex Carotid Ultrasound CAR    Narrative · Proximal  right internal carotid artery mild stenosis.  · Proximal left internal carotid artery mild stenosis.          Results for orders placed during the hospital encounter of 08/23/19   Adult Transthoracic Echo Complete W/ Cont if Necessary Per Protocol    Narrative · Estimated EF = 60%.  · Left ventricular systolic function is normal.       Indications  Syncope    Technically satisfactory study.  Mitral valve is structurally normal.  Tricuspid valve is structurally normal.  Aortic valve is structurally normal.  Pulmonic valve could not be well visualized.  No evidence for mitral tricuspid or aortic regurgitation is seen by   Doppler study.  Left atrium is normal in size.  Right atrium is normal in size.  Left ventricle is normal in size and contractility with ejection fraction   of 60%.  Right ventricle is normal in size.  Atrial septum is intact.  Aorta is normal.  No pericardial effusion or intracardiac thrombus is seen.    Impression  Structurally and functionally normal cardiac valves.  Normal echo Doppler study.  Left ventricular ejection fraction is 60%.           Ct Abdomen Pelvis Without Contrast    Result Date: 3/2/2020   1.  Large amount of high density stool throughout the colon which is distended but improved from prior study as detailed above.  Findings would be compatible with patient's history of constipation.  No evidence of bowel obstruction. 2.  Small areas of groundglass airspace disease in both lung bases which is nonspecific and may be infectious or inflammatory in etiology.  Electronically Signed By-Tony Leon On:3/2/2020 3:14 PM This report was finalized on [id number] by  Tony Leon, .    Ct Chest With Contrast    Result Date: 3/3/2020   1.  Borderline enlarged right hilar nodes measuring 1.8 x 1.2 cm.  No other enlarged mediastinal, hilar, or axillary lymph nodes. 2.  No focal airspace consolidation or pleural effusion. 3.  Stable small area of nodular pleural thickening along the  undersurface of the minor fissure within the right middle lobe.  Electronically Signed By-Tony Leon On:3/3/2020 11:18 AM This report was finalized on [id number] by  Tony Leon, .    Xr Chest 1 View    Result Date: 3/2/2020  No acute chest findings.  Electronically Signed By-Dr. Heather Romo MD On:3/2/2020 2:33 PM This report was finalized on [id number] by Dr. Heather Romo MD.          Xrays, labs reviewed personally by physician.    Medication Review:   I have reviewed the patient's current medication list      Scheduled Meds    bisacodyl 10 mg Oral Once   buprenorphine-naloxone 0.5 tablet Sublingual 4x Daily   busPIRone 30 mg Oral Q12H   docusate sodium 100 mg Oral BID   fluticasone 2 spray Nasal Daily   ipratropium-albuterol 3 mL Nebulization Q8H - RT   lamoTRIgine 200 mg Oral Nightly   OLANZapine 20 mg Oral Nightly   pantoprazole 40 mg Intravenous QAM AC   PEG-KCl-NaCl-NaSulf-Na Asc-C 1,000 mL Oral Q12H   polyethylene glycol 17 g Oral Daily   potassium chloride 40 mEq Oral Q4H   sodium chloride 10 mL Intravenous Q12H   venlafaxine  mg Oral Daily       Meds Infusions       Meds PRN  •  acetaminophen **OR** acetaminophen **OR** acetaminophen  •  aluminum-magnesium hydroxide-simethicone  •  bisacodyl  •  bisacodyl  •  Calcium Gluconate-NaCl **AND** calcium gluconate **AND** Calcium, Ionized  •  magnesium hydroxide  •  magnesium sulfate **OR** magnesium sulfate in D5W 1g/100mL (PREMIX)  •  melatonin  •  ondansetron **OR** ondansetron  •  potassium chloride **OR** potassium chloride **OR** potassium chloride  •  [COMPLETED] Insert peripheral IV **AND** sodium chloride  •  sodium chloride  •  zolpidem    I personally reviewed patient's x-ray films and my findings are same    I personally reviewed patient's EKG strips and my findings are same    Assessment/Plan   Assessment/Plan     Active Hospital Problems    Diagnosis  POA   • **Chest pain [R07.9]  Yes   • Hypokalemia [E87.6]  Yes   •  Abdominal pain [R10.9]  Yes   • PTSD (post-traumatic stress disorder) [F43.10]  Yes   • Hep C w/o coma, chronic (CMS/HCC) [B18.2]  Yes   • Severe malnutrition (CMS/HCC) [E43]  Yes   • Constipation [K59.00]  Yes   • Anemia [D64.9]  Unknown   • Vitamin B12 deficiency [E53.8]  Yes   • Bipolar I disorder, most recent episode depressed (CMS/HCC) [F31.30]  Yes   • Generalized anxiety disorder [F41.1]  Yes      Resolved Hospital Problems   No resolved problems to display.       MEDICAL DECISION MAKING COMPLEXITY BY PROBLEM:   Severe constipation  ; Likely multifactorial, due to opiate and poor oral intake and possible dehydration  ; On bowel preparation for colonoscopy    Multiple electrolyte imbalance  ; Replace potassium and prosperous  ; Check 25 hydroxy level to rule out vitamin D deficiency      Chronic nausea and vomiting, easy satiety  ; Scheduled for EGD  ; On PPI      Gradual weight loss and cachexia due to poor oral intake  ; There is no mass detected in CT of the chest, abdomen and pelvis  ; EGD planned    Chronic Suboxone use due to substance abuse in the past    Hepatitis C    : CT shows normal liver    Severe cachexia with a moderate protein  calorie malnutrition    Bipolar disorder/a PTSD  ; On Zyprexa, Lamictal, Effexor, BuSpar      VTE Prophylaxis   Mechanical Order History:      Ordered        03/02/20 1640  Place Sequential Compression Device  Once         03/02/20 1640  Maintain Sequential Compression Device  Continuous                 Pharmalogical Order History:     None            Code Status -   Code Status and Medical Interventions:   Ordered at: 03/02/20 1640     Level Of Support Discussed With:    Patient     Code Status:    CPR     Medical Interventions (Level of Support Prior to Arrest):    Full       Discharge Planning  After the EGD and colonoscopy and constipation resolved        Destination      Coordination has not been started for this encounter.      Durable Medical Equipment       Coordination has not been started for this encounter.      Dialysis/Infusion      Coordination has not been started for this encounter.      Home Medical Care      Coordination has not been started for this encounter.      Therapy      Coordination has not been started for this encounter.      Community Resources      Coordination has not been started for this encounter.            Electronically signed by Caroline Crowley MD, 03/03/20, 5:49 PM.  [facility]

## 2020-03-03 NOTE — CONSULTS
GI CONSULT  NOTE:    Referring Provider:  Dr. Tidwell    Chief complaint: Chest pain, weight loss, constipation     Subjective .     History of present illness: Patient is a 44-year-old female with history of ADHD, anxiety/depression, bipolar, COPD, PTSD, hepatitis C (treatment naïve), substance abuse on Suboxone, and cholecystectomy who presents with complaints of weakness, weight loss, and chest pain.  The patient reports that she has been having chest pain for approximately the past week.  She is unable to describe the pain, but states that it is intermittent.  Unable to identify any exacerbating or alleviating factors.  She does report nausea that worsens with eating.  Also reports occasional vomiting.  Denies any hematemesis or coffee-ground emesis.  States that she does seem to have early satiety and always feels full.  She states that she has been struggling with constipation and reports that her last bowel movement was 3 weeks ago.  She denies any recent change in her Suboxone dose.  No bright red blood per rectum or melena.  She does report a weight loss of approximately 40 pounds in the past 3 months.  Of note, the patient does have a history of hepatitis C which is treatment naïve.  She reports that her last drug use was 4 years ago.      Endo History:  2015 EGD/colonoscopy (Dr. Valentino) -gastritis, empiric dilation to 52 [place], normal colonoscopy, random colon biopsies negative    Past Medical History:  Past Medical History:   Diagnosis Date   • ADHD    • Anxiety    • Bipolar 1 disorder (CMS/HCC)    • COPD (chronic obstructive pulmonary disease) (CMS/HCC)    • Depression    • Hep C w/o coma, chronic (CMS/HCC)    • PTSD (post-traumatic stress disorder)    • Substance abuse (CMS/HCC)    • Vitamin D deficiency        Past Surgical History:  Past Surgical History:   Procedure Laterality Date   •  SECTION     • CHOLECYSTECTOMY     • DILATATION AND CURETTAGE      x2   • TONSILLECTOMY         Social  History:  Social History     Tobacco Use   • Smoking status: Current Every Day Smoker     Packs/day: 1.00     Types: Cigarettes   Substance Use Topics   • Alcohol use: No     Frequency: Never   • Drug use: No       Family History:  Family History   Problem Relation Age of Onset   • Heart disease Mother    • Heart disease Maternal Grandmother    • Heart disease Maternal Grandfather        Medications:  Medications Prior to Admission   Medication Sig Dispense Refill Last Dose   • buprenorphine-naloxone (SUBOXONE) 8-2 MG per SL tablet Place 0.25 tablets under the tongue 4 (Four) Times a Day. (0.5 tab morning, 0.5 tab noon, 0.5 tab afternoon, 0.5 tab bed)   3/2/2020 at 12:00   • busPIRone (BUSPAR) 30 MG tablet Take 30 mg by mouth 2 (Two) Times a Day.   3/2/2020 at Unknown time   • docusate sodium (COLACE) 100 MG capsule Take 100 mg by mouth 2 (Two) Times a Day.   3/2/2020 at Unknown time   • fluticasone (FLONASE) 50 MCG/ACT nasal spray 2 sprays into the nostril(s) as directed by provider Daily.   3/2/2020 at Unknown time   • lamoTRIgine (LAMICTAL) 100 MG tablet Take 200 mg by mouth every night at bedtime.   3/1/2020 at Unknown time   • OLANZapine (zyPREXA) 10 MG tablet Take 20 mg by mouth Every Night.   3/1/2020 at Unknown time   • polyethylene glycol (MIRALAX) packet Take 17 g by mouth Daily As Needed (constipation).   Past Week at Unknown time   • ramelteon (ROZEREM) 8 MG tablet Take 8 mg by mouth Every Night.   3/1/2020 at Unknown time   • umeclidinium-vilanterol (ANORO ELLIPTA) 62.5-25 MCG/INH aerosol powder  inhaler Inhale 1 puff Daily.   3/2/2020 at Unknown time   • venlafaxine XR (EFFEXOR-XR) 150 MG 24 hr capsule Take 150 mg by mouth Daily.   3/2/2020 at Unknown time       Scheduled Meds:  buprenorphine-naloxone 0.5 tablet Sublingual 4x Daily   busPIRone 30 mg Oral Q12H   docusate sodium 100 mg Oral BID   fluticasone 2 spray Nasal Daily   ipratropium-albuterol 3 mL Nebulization Q8H - RT   lamoTRIgine 200 mg Oral  Nightly   OLANZapine 20 mg Oral Nightly   polyethylene glycol 17 g Oral Daily   potassium chloride 40 mEq Oral Q4H   sodium chloride 10 mL Intravenous Q12H   venlafaxine  mg Oral Daily     Continuous Infusions:   PRN Meds:.•  acetaminophen **OR** acetaminophen **OR** acetaminophen  •  aluminum-magnesium hydroxide-simethicone  •  bisacodyl  •  bisacodyl  •  Calcium Gluconate-NaCl **AND** calcium gluconate **AND** Calcium, Ionized  •  magnesium hydroxide  •  magnesium sulfate **OR** magnesium sulfate in D5W 1g/100mL (PREMIX)  •  melatonin  •  ondansetron **OR** ondansetron  •  [COMPLETED] Insert peripheral IV **AND** sodium chloride  •  sodium chloride  •  zolpidem    ALLERGIES:  Erythromycin base and Lithium    ROS:  Review of Systems   Constitutional: Positive for unexpected weight change. Negative for chills and fever.   HENT: Negative for sore throat and trouble swallowing.    Respiratory: Negative for cough and shortness of breath.    Cardiovascular: Positive for chest pain. Negative for leg swelling.   Gastrointestinal: Positive for abdominal pain, constipation, nausea and vomiting. Negative for abdominal distention, anal bleeding, blood in stool, diarrhea and rectal pain.   Genitourinary: Negative for dysuria and hematuria.   Musculoskeletal: Negative for back pain and joint swelling.   Skin: Negative for color change and rash.   Neurological: Negative for dizziness and light-headedness.   Psychiatric/Behavioral: Negative for confusion. The patient is not nervous/anxious.        Objective     Vital Signs:   Temp:  [97.3 °F (36.3 °C)-98.3 °F (36.8 °C)] 98.3 °F (36.8 °C)  Heart Rate:  [] 89  Resp:  [14-17] 17  BP: ()/(58-78) 107/69    Physical Exam:      General Appearance:    Awake and alert, in no acute distress   Head:    Normocephalic, without obvious abnormality, atraumatic   Eyes:            Conjunctivae normal, anicteric sclera   Ears:    Ears appear intact with no abnormalities noted      Throat:   No oral lesions, no thrush, oral mucosa moist   Neck:   No adenopathy, supple, no thyromegaly, no JVD   Lungs:     Clear to auscultation bilaterally, respirations regular, even and unlabored    Heart:    Regular rhythm and normal rate, normal S1 and S2   Chest Wall:    No abnormalities observed   Abdomen:     Hypoactive bowel sounds, soft, non-tender, no rebound or guarding, non-distended    Rectal:     Deferred   Extremities:   Moves all extremities well, no edema, no cyanosis, no             redness   Pulses:   Pulses palpable and equal bilaterally   Skin:   No bleeding, bruising or rash, no jaundice   Lymph nodes:   No palpable adenopathy   Neurologic:   Cranial nerves 2 - 12 grossly intact, no asterixis, sensation intact       Results Review:   I reviewed the patient's labs and imaging.  Lab Results (last 24 hours)     Procedure Component Value Units Date/Time    CK [[id number]]  (Normal) Collected:  03/03/20 0344    Specimen:  Blood Updated:  03/03/20 0508     Creatine Kinase 39 U/L      Comment: Result checked        Sedimentation Rate [[id number]]  (Normal) Collected:  03/03/20 0344    Specimen:  Blood Updated:  03/03/20 0501     Sed Rate 12 mm/hr     Phosphorus [[id number]]  (Abnormal) Collected:  03/03/20 0344    Specimen:  Blood Updated:  03/03/20 0457     Phosphorus 1.5 mg/dL     Troponin [[id number]]  (Normal) Collected:  03/03/20 0344    Specimen:  Blood Updated:  03/03/20 0456     Troponin T <0.010 ng/mL     Narrative:       Troponin T Reference Range:  <= 0.03 ng/mL-   Negative for AMI  >0.03 ng/mL-     Abnormal for myocardial necrosis.  Clinicians would have to utilize clinical acumen, EKG, Troponin and serial changes to determine if it is an Acute Myocardial Infarction or myocardial injury due to an underlying chronic condition.       Results may be falsely decreased if patient taking Biotin.      Ferritin [[id number]]  (Abnormal) Collected:  03/03/20 0344    Specimen:  Blood Updated:   03/03/20 0456     Ferritin 219.00 ng/mL     Narrative:       Results may be falsely decreased if patient taking Biotin.      TSH [[id number]]  (Abnormal) Collected:  03/03/20 0344    Specimen:  Blood Updated:  03/03/20 0456     TSH 6.560 uIU/mL     Comprehensive Metabolic Panel [[id number]]  (Abnormal) Collected:  03/03/20 0344    Specimen:  Blood Updated:  03/03/20 0453     Glucose 107 mg/dL      BUN 12 mg/dL      Creatinine 0.85 mg/dL      Sodium 141 mmol/L      Potassium 2.8 mmol/L      Chloride 120 mmol/L      CO2 14.0 mmol/L      Calcium 7.7 mg/dL      Total Protein 5.5 g/dL      Albumin 3.30 g/dL      ALT (SGPT) 25 U/L      AST (SGOT) 21 U/L      Alkaline Phosphatase 94 U/L      Total Bilirubin 0.2 mg/dL      eGFR Non African Amer 73 mL/min/1.73      Globulin 2.2 gm/dL      A/G Ratio 1.5 g/dL      BUN/Creatinine Ratio 14.1     Anion Gap 7.0 mmol/L     Narrative:       GFR Normal >60  Chronic Kidney Disease <60  Kidney Failure <15      Uric Acid [[id number]]  (Abnormal) Collected:  03/03/20 0344    Specimen:  Blood Updated:  03/03/20 0453     Uric Acid 2.0 mg/dL     Lipid Panel [[id number]]  (Abnormal) Collected:  03/03/20 0344    Specimen:  Blood Updated:  03/03/20 0453     Total Cholesterol 82 mg/dL      Triglycerides 63 mg/dL      HDL Cholesterol 30 mg/dL      LDL Cholesterol  39 mg/dL      VLDL Cholesterol 12.6 mg/dL      LDL/HDL Ratio 1.31    Narrative:       Cholesterol Reference Ranges  (U.S. Department of Health and Human Services ATP III Classifications)    Desirable          <200 mg/dL  Borderline High    200-239 mg/dL  High Risk          >240 mg/dL      Triglyceride Reference Ranges  (U.S. Department of Health and Human Services ATP III Classifications)    Normal           <150 mg/dL  Borderline High  150-199 mg/dL  High             200-499 mg/dL  Very High        >500 mg/dL    HDL Reference Ranges  (U.S. Department of Health and Human Services ATP III Classifcations)    Low     <40 mg/dl (major risk  factor for CHD)  High    >60 mg/dl ('negative' risk factor for CHD)        LDL Reference Ranges  (U.S. Department of Health and Human Services ATP III Classifcations)    Optimal          <100 mg/dL  Near Optimal     100-129 mg/dL  Borderline High  130-159 mg/dL  High             160-189 mg/dL  Very High        >189 mg/dL    C-reactive Protein [[id number]]  (Normal) Collected:  03/03/20 0344    Specimen:  Blood Updated:  03/03/20 0453     C-Reactive Protein 0.05 mg/dL     Magnesium [[id number]]  (Normal) Collected:  03/03/20 0344    Specimen:  Blood Updated:  03/03/20 0452     Magnesium 2.2 mg/dL     CBC & Differential [[id number]] Collected:  03/03/20 0344    Specimen:  Blood Updated:  03/03/20 0417    Narrative:       The following orders were created for panel order CBC & Differential.  Procedure                               Abnormality         Status                     ---------                               -----------         ------                     CBC Auto Differential[[id number]]        Abnormal            Final result                 Please view results for these tests on the individual orders.    CBC Auto Differential [[id number]]  (Abnormal) Collected:  03/03/20 0344    Specimen:  Blood Updated:  03/03/20 0417     WBC 9.60 10*3/mm3      RBC 3.19 10*6/mm3      Hemoglobin 8.9 g/dL      Hematocrit 27.0 %      MCV 84.7 fL      MCH 27.8 pg      MCHC 32.7 g/dL      RDW 16.3 %      RDW-SD 48.6 fl      MPV 8.8 fL      Platelets 243 10*3/mm3      Neutrophil % 46.1 %      Lymphocyte % 47.4 %      Monocyte % 5.3 %      Eosinophil % 0.7 %      Basophil % 0.5 %      Neutrophils, Absolute 4.40 10*3/mm3      Lymphocytes, Absolute 4.60 10*3/mm3      Monocytes, Absolute 0.50 10*3/mm3      Eosinophils, Absolute 0.10 10*3/mm3      Basophils, Absolute 0.00 10*3/mm3      nRBC 0.2 /100 WBC     Vitamin B12 [[id number]] Collected:  03/03/20 0344    Specimen:  Blood Updated:  03/03/20 0411    Hemoglobin A1c [[id number]] Collected:   03/03/20 0344    Specimen:  Blood Updated:  03/03/20 0407    Calcium, Ionized [[id number]]  (Abnormal) Collected:  03/02/20 2014    Specimen:  Blood from Arm, Right Updated:  03/02/20 2106     Ionized Calcium 1.34 mmol/L     Troponin [[id number]]  (Normal) Collected:  03/02/20 2014    Specimen:  Blood from Arm, Right Updated:  03/02/20 2105     Troponin T <0.010 ng/mL     Narrative:       Troponin T Reference Range:  <= 0.03 ng/mL-   Negative for AMI  >0.03 ng/mL-     Abnormal for myocardial necrosis.  Clinicians would have to utilize clinical acumen, EKG, Troponin and serial changes to determine if it is an Acute Myocardial Infarction or myocardial injury due to an underlying chronic condition.       Results may be falsely decreased if patient taking Biotin.      Potassium [[id number]]  (Abnormal) Collected:  03/02/20 2014    Specimen:  Blood from Arm, Right Updated:  03/02/20 2102     Potassium 2.6 mmol/L     Magnesium [[id number]]  (Normal) Collected:  03/02/20 2014    Specimen:  Blood from Arm, Right Updated:  03/02/20 2101     Magnesium 2.1 mg/dL     Urine Drug Screen - Urine, Clean Catch [[id number]]  (Normal) Collected:  03/02/20 1438    Specimen:  Urine, Clean Catch Updated:  03/02/20 1516     Amphet/Methamphet, Screen Negative     Barbiturates Screen, Urine Negative     Benzodiazepine Screen, Urine Negative     Cocaine Screen, Urine Negative     Opiate Screen Negative     THC, Screen, Urine Negative     Methadone Screen, Urine Negative     Oxycodone Screen, Urine Negative    Narrative:       Negative Thresholds For Drugs Screened:     Amphetamines               500 ng/ml   Barbiturates               200 ng/ml   Benzodiazepines            100 ng/ml   Cocaine                    300 ng/ml   Methadone                  300 ng/ml   Opiates                    300 ng/ml   Oxycodone                  100 ng/ml   THC                        50 ng/ml    The Normal Value for all drugs tested is negative. This report includes  final unconfirmed screening results to be used for medical treatment purposes only. Unconfirmed results must not be used for non-medical purposes such as employment or legal testing. Clinical consideration should be applied to any drug of abuse test, particulary when unconfirmed results are used.  All urine drugs of abuse requests without chain of custody are for medical screening purposes only.  False positives are possible.      Comprehensive Metabolic Panel [[id number]]  (Abnormal) Collected:  03/02/20 1424    Specimen:  Blood Updated:  03/02/20 1501     Glucose 141 mg/dL      BUN 16 mg/dL      Creatinine 1.14 mg/dL      Sodium 139 mmol/L      Potassium 2.4 mmol/L      Chloride 114 mmol/L      CO2 14.0 mmol/L      Calcium 10.1 mg/dL      Total Protein 6.6 g/dL      Albumin 4.10 g/dL      ALT (SGPT) 29 U/L      AST (SGOT) 19 U/L      Alkaline Phosphatase 114 U/L      Total Bilirubin 0.2 mg/dL      eGFR Non African Amer 52 mL/min/1.73      Globulin 2.5 gm/dL      A/G Ratio 1.6 g/dL      BUN/Creatinine Ratio 14.0     Anion Gap 11.0 mmol/L     Narrative:       GFR Normal >60  Chronic Kidney Disease <60  Kidney Failure <15      Urinalysis With Microscopic If Indicated (No Culture) - Urine, Clean Catch [[id number]]  (Abnormal) Collected:  03/02/20 1438    Specimen:  Urine, Clean Catch Updated:  03/02/20 1501     Color, UA Yellow     Appearance, UA Clear     pH, UA 6.5     Specific Gravity, UA 1.016     Glucose, UA Negative     Ketones, UA Negative     Bilirubin, UA Negative     Blood, UA Negative     Protein,  mg/dL (2+)     Leuk Esterase, UA Trace     Nitrite, UA Negative     Urobilinogen, UA 1.0 E.U./dL    Urinalysis, Microscopic Only - Urine, Clean Catch [[id number]]  (Abnormal) Collected:  03/02/20 1438    Specimen:  Urine, Clean Catch Updated:  03/02/20 1501     RBC, UA 0-2 /HPF      WBC, UA 6-12 /HPF      Bacteria, UA None Seen /HPF      Squamous Epithelial Cells, UA 3-6 /HPF      Hyaline Casts, UA 3-6 /LPF       Methodology Automated Microscopy    Magnesium [[id number]]  (Normal) Collected:  03/02/20 1424    Specimen:  Blood Updated:  03/02/20 1458     Magnesium 2.2 mg/dL     Troponin [[id number]]  (Normal) Collected:  03/02/20 1424    Specimen:  Blood Updated:  03/02/20 1458     Troponin T <0.010 ng/mL     Narrative:       Troponin T Reference Range:  <= 0.03 ng/mL-   Negative for AMI  >0.03 ng/mL-     Abnormal for myocardial necrosis.  Clinicians would have to utilize clinical acumen, EKG, Troponin and serial changes to determine if it is an Acute Myocardial Infarction or myocardial injury due to an underlying chronic condition.       Results may be falsely decreased if patient taking Biotin.      CBC & Differential [[id number]] Collected:  03/02/20 1424    Specimen:  Blood Updated:  03/02/20 1437    Narrative:       The following orders were created for panel order CBC & Differential.  Procedure                               Abnormality         Status                     ---------                               -----------         ------                     CBC Auto Differential[[id number]]        Abnormal            Final result                 Please view results for these tests on the individual orders.    CBC Auto Differential [[id number]]  (Abnormal) Collected:  03/02/20 1424    Specimen:  Blood Updated:  03/02/20 1437     WBC 9.20 10*3/mm3      RBC 3.73 10*6/mm3      Hemoglobin 10.4 g/dL      Hematocrit 31.4 %      MCV 84.1 fL      MCH 27.9 pg      MCHC 33.2 g/dL      RDW 15.9 %      RDW-SD 46.4 fl      MPV 8.6 fL      Platelets 301 10*3/mm3      Neutrophil % 56.7 %      Lymphocyte % 36.3 %      Monocyte % 6.0 %      Eosinophil % 0.5 %      Basophil % 0.5 %      Neutrophils, Absolute 5.20 10*3/mm3      Lymphocytes, Absolute 3.30 10*3/mm3      Monocytes, Absolute 0.50 10*3/mm3      Eosinophils, Absolute 0.00 10*3/mm3      Basophils, Absolute 0.00 10*3/mm3      nRBC 0.1 /100 WBC           Imaging Results (Last 24  Hours)     Procedure Component Value Units Date/Time    CT Chest With Contrast [[id number]] Resulted:  03/03/20 1105     Updated:  03/03/20 1054    CT Abdomen Pelvis Without Contrast [[id number]] Collected:  03/02/20 1509     Updated:  03/02/20 1517    Narrative:       CT ABDOMEN PELVIS WO CONTRAST-     Date of Exam: 3/2/2020 2:57 PM     Indication: constipation.  ,  Umbilical pain, nausea vomiting      Comparison Exams: 2/24/2020     Technique: Multiple axial images were obtained from the lung bases  through the kidneys without IV contrast. Coronal and sagittal  reconstructions were performed.  Automated exposure control and iterative reconstruction methods were  used.     FINDINGS:  There are small areas of groundglass opacity in both lung bases which  may be infectious or inflammatory in etiology.  No pleural effusion  identified.  No definite free intraperitoneal air.  Liver, pancreas, and  spleen appear within normal limits.  Bilateral adrenal glands are  normal.  No definite renal or ureteral stone or hydronephrosis.  There  is a prominent extrarenal pelvis on the right.  Patient is status post  cholecystectomy.  There is enlargement of the common bile duct most  likely related to prior cholecystectomy.  No obstructing stone or mass  identified.  The unopacified upper GI tract is within normal limits.   Colon is diffusely distended and contains high density stool which  appears slightly Improved from the prior exam.  For example the cecum  now measures 6.3 cm, previously 9.4 cm.     Pelvis: The urinary bladder appears within normal limits.  The uterus  and ovaries are within normal limits.  No pelvic or inguinal adenopathy  identified.  There is no free intraperitoneal fluid.  No lytic or  sclerotic bony lesions are seen.         Impression:          1.  Large amount of high density stool throughout the colon which is  distended but improved from prior study as detailed above.  Findings  would be compatible with  patient's history of constipation.  No evidence  of bowel obstruction.  2.  Small areas of groundglass airspace disease in both lung bases which  is nonspecific and may be infectious or inflammatory in etiology.     Electronically Signed By-Tony Leon On:3/2/2020 3:14 PM  This report was finalized on [id number] by  Tony Leon, .    XR Chest 1 View [[id number]] Collected:  03/02/20 1431     Updated:  03/02/20 1435    Narrative:       DATE OF EXAM:  3/2/2020 2:15 PM     PROCEDURE:  XR CHEST 1 VW-     INDICATIONS:  40 pound weight loss in 3 months. COPD. Smoker. Shortness of breath  today. Weakness.       COMPARISON:  AP portable chest 02/24/2020.     TECHNIQUE:   Single radiographic view of the chest was obtained.     FINDINGS:  No Acute airspace disease. Normal heart size. Benign calcified left  hilar lymph node. No acute osseous abnormality. No pleural effusion or  pneumothorax.       Impression:       No acute chest findings.     Electronically Signed By-Dr. Heather Romo MD On:3/2/2020 2:33 PM  This report was finalized on [id number] by Dr. Heather Romo MD.             ASSESSMENT:  -Chest pain  -Constipation -likely secondary to chronic narcotic use  -Unintentional weight loss  -Nausea/vomiting -likely due to constipation  -Chronic hepatitis C -treatment naïve  -Hypokalemia  -Normocytic anemia  -ADHD/anxiety/depression/bipolar/PTSD  -COPD  -History of substance abuse on Suboxone -reports last drug use 4 years ago  -History of cholecystectomy      PLAN:  Patient presents with complaints of chest pain, weakness, and unintentional weight loss of 40 pounds in the past 3 months.  CT abdomen/pelvis without contrast on admission shows a large amount of stool throughout the colon.  We will plan bowel purge with magnesium citrate, Relistor, and Reglan.  Also plan EGD/colonoscopy tomorrow to further evaluate the patient's complaints.  Clear liquid diet.  N.p.o. following bowel prep.  Check B12 level as  patient has a reported history of B12 deficiency.  Patient does report a history of hepatitis C.  We will check HCV RNA and genotype.  Could consider outpatient treatment following discharge.  Start PPI.  Antiemetics/analgesics as needed.  Supportive care.    I discussed the patients findings and my recommendations with the patient.  I will discuss the case with Dr. Lovell and change the plan accordingly.  MAXIMUS Osorio  03/03/20  11:10 AM

## 2020-03-04 ENCOUNTER — INPATIENT HOSPITAL (AMBULATORY)
Dept: URBAN - METROPOLITAN AREA HOSPITAL 84 | Facility: HOSPITAL | Age: 45
End: 2020-03-04
Payer: COMMERCIAL

## 2020-03-04 ENCOUNTER — ANESTHESIA (OUTPATIENT)
Dept: GASTROENTEROLOGY | Facility: HOSPITAL | Age: 45
End: 2020-03-04

## 2020-03-04 ENCOUNTER — ANESTHESIA EVENT (OUTPATIENT)
Dept: GASTROENTEROLOGY | Facility: HOSPITAL | Age: 45
End: 2020-03-04

## 2020-03-04 DIAGNOSIS — R11.2 NAUSEA WITH VOMITING, UNSPECIFIED: ICD-10-CM

## 2020-03-04 DIAGNOSIS — D64.9 ANEMIA, UNSPECIFIED: ICD-10-CM

## 2020-03-04 DIAGNOSIS — K59.00 CONSTIPATION, UNSPECIFIED: ICD-10-CM

## 2020-03-04 DIAGNOSIS — Z53.8 PROCEDURE AND TREATMENT NOT CARRIED OUT FOR OTHER REASONS: ICD-10-CM

## 2020-03-04 DIAGNOSIS — K29.70 GASTRITIS, UNSPECIFIED, WITHOUT BLEEDING: ICD-10-CM

## 2020-03-04 DIAGNOSIS — K20.9 ESOPHAGITIS, UNSPECIFIED: ICD-10-CM

## 2020-03-04 DIAGNOSIS — R10.9 UNSPECIFIED ABDOMINAL PAIN: ICD-10-CM

## 2020-03-04 LAB
25(OH)D3 SERPL-MCNC: 46.8 NG/ML (ref 30–100)
ANION GAP SERPL CALCULATED.3IONS-SCNC: 8 MMOL/L (ref 5–15)
BUN BLD-MCNC: 7 MG/DL (ref 6–20)
BUN/CREAT SERPL: 8.8 (ref 7–25)
CALCIUM SPEC-SCNC: 8.1 MG/DL (ref 8.6–10.5)
CHLORIDE SERPL-SCNC: 126 MMOL/L (ref 98–107)
CO2 SERPL-SCNC: 10 MMOL/L (ref 22–29)
CREAT BLD-MCNC: 0.8 MG/DL (ref 0.57–1)
GFR SERPL CREATININE-BSD FRML MDRD: 79 ML/MIN/1.73
GLUCOSE BLD-MCNC: 110 MG/DL (ref 65–99)
IRON 24H UR-MRATE: 57 MCG/DL (ref 37–145)
IRON SATN MFR SERPL: 35 % (ref 20–50)
PHOSPHATE SERPL-MCNC: 1.2 MG/DL (ref 2.5–4.5)
POTASSIUM BLD-SCNC: 3.9 MMOL/L (ref 3.5–5.2)
SODIUM BLD-SCNC: 144 MMOL/L (ref 136–145)
TIBC SERPL-MCNC: 164 MCG/DL (ref 298–536)
TRANSFERRIN SERPL-MCNC: 110 MG/DL (ref 200–360)

## 2020-03-04 PROCEDURE — 84100 ASSAY OF PHOSPHORUS: CPT | Performed by: INTERNAL MEDICINE

## 2020-03-04 PROCEDURE — 82306 VITAMIN D 25 HYDROXY: CPT | Performed by: INTERNAL MEDICINE

## 2020-03-04 PROCEDURE — 0DJD8ZZ INSPECTION OF LOWER INTESTINAL TRACT, VIA NATURAL OR ARTIFICIAL OPENING ENDOSCOPIC: ICD-10-PCS | Performed by: INTERNAL MEDICINE

## 2020-03-04 PROCEDURE — 0DB68ZX EXCISION OF STOMACH, VIA NATURAL OR ARTIFICIAL OPENING ENDOSCOPIC, DIAGNOSTIC: ICD-10-PCS | Performed by: INTERNAL MEDICINE

## 2020-03-04 PROCEDURE — 94799 UNLISTED PULMONARY SVC/PX: CPT

## 2020-03-04 PROCEDURE — 83540 ASSAY OF IRON: CPT | Performed by: INTERNAL MEDICINE

## 2020-03-04 PROCEDURE — 25010000002 PROPOFOL 200 MG/20ML EMULSION: Performed by: ANESTHESIOLOGY

## 2020-03-04 PROCEDURE — 88305 TISSUE EXAM BY PATHOLOGIST: CPT | Performed by: INTERNAL MEDICINE

## 2020-03-04 PROCEDURE — 99232 SBSQ HOSP IP/OBS MODERATE 35: CPT | Performed by: INTERNAL MEDICINE

## 2020-03-04 PROCEDURE — 43239 EGD BIOPSY SINGLE/MULTIPLE: CPT | Performed by: INTERNAL MEDICINE

## 2020-03-04 PROCEDURE — 80048 BASIC METABOLIC PNL TOTAL CA: CPT | Performed by: INTERNAL MEDICINE

## 2020-03-04 PROCEDURE — 45330 DIAGNOSTIC SIGMOIDOSCOPY: CPT | Performed by: INTERNAL MEDICINE

## 2020-03-04 PROCEDURE — 84466 ASSAY OF TRANSFERRIN: CPT | Performed by: INTERNAL MEDICINE

## 2020-03-04 PROCEDURE — 88342 IMHCHEM/IMCYTCHM 1ST ANTB: CPT | Performed by: INTERNAL MEDICINE

## 2020-03-04 RX ORDER — PHENYLEPHRINE HCL IN 0.9% NACL 0.5 MG/5ML
SYRINGE (ML) INTRAVENOUS AS NEEDED
Status: DISCONTINUED | OUTPATIENT
Start: 2020-03-04 | End: 2020-03-04 | Stop reason: SURG

## 2020-03-04 RX ORDER — MAGNESIUM CARB/ALUMINUM HYDROX 105-160MG
296 TABLET,CHEWABLE ORAL ONCE
Status: COMPLETED | OUTPATIENT
Start: 2020-03-04 | End: 2020-03-04

## 2020-03-04 RX ORDER — LIDOCAINE HYDROCHLORIDE 20 MG/ML
INJECTION, SOLUTION EPIDURAL; INFILTRATION; INTRACAUDAL; PERINEURAL AS NEEDED
Status: DISCONTINUED | OUTPATIENT
Start: 2020-03-04 | End: 2020-03-04 | Stop reason: SURG

## 2020-03-04 RX ORDER — SODIUM CHLORIDE 0.9 % (FLUSH) 0.9 %
10 SYRINGE (ML) INJECTION EVERY 12 HOURS SCHEDULED
Status: DISCONTINUED | OUTPATIENT
Start: 2020-03-04 | End: 2020-03-04

## 2020-03-04 RX ORDER — SODIUM CHLORIDE 0.9 % (FLUSH) 0.9 %
10 SYRINGE (ML) INJECTION AS NEEDED
Status: DISCONTINUED | OUTPATIENT
Start: 2020-03-04 | End: 2020-03-04

## 2020-03-04 RX ORDER — PANTOPRAZOLE SODIUM 40 MG/1
40 TABLET, DELAYED RELEASE ORAL
Status: DISCONTINUED | OUTPATIENT
Start: 2020-03-05 | End: 2020-03-06 | Stop reason: HOSPADM

## 2020-03-04 RX ORDER — SODIUM CHLORIDE 9 MG/ML
30 INJECTION, SOLUTION INTRAVENOUS CONTINUOUS PRN
Status: DISCONTINUED | OUTPATIENT
Start: 2020-03-04 | End: 2020-03-04 | Stop reason: SDUPTHER

## 2020-03-04 RX ORDER — SODIUM CHLORIDE 0.9 % (FLUSH) 0.9 %
10 SYRINGE (ML) INJECTION AS NEEDED
Status: DISCONTINUED | OUTPATIENT
Start: 2020-03-04 | End: 2020-03-05 | Stop reason: HOSPADM

## 2020-03-04 RX ORDER — PROPOFOL 10 MG/ML
INJECTION, EMULSION INTRAVENOUS AS NEEDED
Status: DISCONTINUED | OUTPATIENT
Start: 2020-03-04 | End: 2020-03-04 | Stop reason: SURG

## 2020-03-04 RX ORDER — SORBITOL SOLUTION 70 %
50 SOLUTION, ORAL MISCELLANEOUS 2 TIMES DAILY
Status: COMPLETED | OUTPATIENT
Start: 2020-03-05 | End: 2020-03-05

## 2020-03-04 RX ORDER — SODIUM CHLORIDE 9 MG/ML
9 INJECTION, SOLUTION INTRAVENOUS CONTINUOUS PRN
Status: DISCONTINUED | OUTPATIENT
Start: 2020-03-04 | End: 2020-03-06 | Stop reason: HOSPADM

## 2020-03-04 RX ORDER — SODIUM CHLORIDE 0.9 % (FLUSH) 0.9 %
3 SYRINGE (ML) INJECTION EVERY 12 HOURS SCHEDULED
Status: DISCONTINUED | OUTPATIENT
Start: 2020-03-04 | End: 2020-03-05 | Stop reason: HOSPADM

## 2020-03-04 RX ORDER — BISACODYL 5 MG/1
10 TABLET, DELAYED RELEASE ORAL ONCE
Status: COMPLETED | OUTPATIENT
Start: 2020-03-04 | End: 2020-03-04

## 2020-03-04 RX ADMIN — Medication 3 ML: at 14:45

## 2020-03-04 RX ADMIN — PHENYLEPHRINE HYDROCHLORIDE 100 MCG: 10 INJECTION INTRAVENOUS at 12:58

## 2020-03-04 RX ADMIN — Medication 296 ML: at 14:46

## 2020-03-04 RX ADMIN — Medication 10 ML: at 07:41

## 2020-03-04 RX ADMIN — PROPOFOL 25 MG: 10 INJECTION, EMULSION INTRAVENOUS at 12:56

## 2020-03-04 RX ADMIN — OLANZAPINE 20 MG: 10 TABLET, FILM COATED ORAL at 20:52

## 2020-03-04 RX ADMIN — LIDOCAINE HYDROCHLORIDE 30 MG: 20 INJECTION, SOLUTION EPIDURAL; INFILTRATION; INTRACAUDAL; PERINEURAL at 12:54

## 2020-03-04 RX ADMIN — PROPOFOL 25 MG: 10 INJECTION, EMULSION INTRAVENOUS at 13:08

## 2020-03-04 RX ADMIN — PROPOFOL 100 MG: 10 INJECTION, EMULSION INTRAVENOUS at 12:54

## 2020-03-04 RX ADMIN — DOCUSATE SODIUM 100 MG: 100 CAPSULE, LIQUID FILLED ORAL at 07:40

## 2020-03-04 RX ADMIN — VENLAFAXINE HYDROCHLORIDE 150 MG: 75 CAPSULE, EXTENDED RELEASE ORAL at 07:40

## 2020-03-04 RX ADMIN — Medication 3 ML: at 20:59

## 2020-03-04 RX ADMIN — BISACODYL 10 MG: 5 TABLET, COATED ORAL at 14:45

## 2020-03-04 RX ADMIN — PANTOPRAZOLE SODIUM 40 MG: 40 INJECTION, POWDER, FOR SOLUTION INTRAVENOUS at 07:39

## 2020-03-04 RX ADMIN — POLYETHYLENE GLYCOL 3350, SODIUM SULFATE, SODIUM CHLORIDE, POTASSIUM CHLORIDE, ASCORBIC ACID, SODIUM ASCORBATE 1000 ML: KIT at 03:16

## 2020-03-04 RX ADMIN — BUPRENORPHINE AND NALOXONE 0.5 TABLET: 8; 2 TABLET SUBLINGUAL at 11:35

## 2020-03-04 RX ADMIN — POTASSIUM PHOSPHATE, MONOBASIC AND POTASSIUM PHOSPHATE, DIBASIC 20 MMOL: 224; 236 INJECTION, SOLUTION, CONCENTRATE INTRAVENOUS at 17:14

## 2020-03-04 RX ADMIN — SODIUM CHLORIDE: 0.9 INJECTION, SOLUTION INTRAVENOUS at 12:55

## 2020-03-04 RX ADMIN — Medication 10 ML: at 20:58

## 2020-03-04 RX ADMIN — BUSPIRONE HYDROCHLORIDE 30 MG: 15 TABLET ORAL at 07:40

## 2020-03-04 RX ADMIN — PROPOFOL 25 MG: 10 INJECTION, EMULSION INTRAVENOUS at 12:59

## 2020-03-04 RX ADMIN — IPRATROPIUM BROMIDE AND ALBUTEROL SULFATE 3 ML: .5; 3 SOLUTION RESPIRATORY (INHALATION) at 08:29

## 2020-03-04 RX ADMIN — ACETAMINOPHEN 650 MG: 325 TABLET, FILM COATED ORAL at 23:38

## 2020-03-04 RX ADMIN — DOCUSATE SODIUM 100 MG: 100 CAPSULE, LIQUID FILLED ORAL at 20:51

## 2020-03-04 RX ADMIN — PROPOFOL 25 MG: 10 INJECTION, EMULSION INTRAVENOUS at 13:02

## 2020-03-04 RX ADMIN — FLUTICASONE PROPIONATE 2 SPRAY: 50 SPRAY, METERED NASAL at 08:14

## 2020-03-04 RX ADMIN — BUPRENORPHINE AND NALOXONE 0.5 TABLET: 8; 2 TABLET SUBLINGUAL at 07:40

## 2020-03-04 RX ADMIN — BUPRENORPHINE AND NALOXONE 0.5 TABLET: 8; 2 TABLET SUBLINGUAL at 17:14

## 2020-03-04 RX ADMIN — BUSPIRONE HYDROCHLORIDE 30 MG: 15 TABLET ORAL at 20:52

## 2020-03-04 RX ADMIN — PROPOFOL 25 MG: 10 INJECTION, EMULSION INTRAVENOUS at 12:55

## 2020-03-04 RX ADMIN — PROPOFOL 25 MG: 10 INJECTION, EMULSION INTRAVENOUS at 13:05

## 2020-03-04 RX ADMIN — ZOLPIDEM TARTRATE 5 MG: 5 TABLET, COATED ORAL at 20:56

## 2020-03-04 RX ADMIN — LAMOTRIGINE 200 MG: 100 TABLET ORAL at 20:51

## 2020-03-04 RX ADMIN — IPRATROPIUM BROMIDE AND ALBUTEROL SULFATE 3 ML: .5; 3 SOLUTION RESPIRATORY (INHALATION) at 15:03

## 2020-03-04 RX ADMIN — Medication 296 ML: at 07:39

## 2020-03-04 RX ADMIN — BUPRENORPHINE AND NALOXONE 0.5 TABLET: 8; 2 TABLET SUBLINGUAL at 20:52

## 2020-03-04 NOTE — CONSULTS
Nutrition Services    Patient Name:  Elizabeth Gómez  YOB: 1975  MRN: [id number]  Admit Date:  3/2/2020    Comments:   Severe chronic illness malnutrition related to inadequate po intake with noted PMH as evidenced by weight loss of 25% in 6.5 months with report of po intake providing less than 50% of nutrition needs for greater than 1 month and s/s of muscle and fat wasting in nutrition focused physical exam.    Nutrition interventions: Await diet upgrade pending colonoscopy results. Goal diet, if no findings, is Regular. Will revisit patient once diet advanced in order to trial nutrition supplements.      Reason for Assessment                Reason for Assessment    Reason For Assessment 3/4/20: Low BMI, MST 5, Consult poor po       Diagnosis H&P:   44 y.o. cachectic female who has recently lost 40lbs in the last 3 months presented to the ER 3/2/20 with complaints of chest pressure upon exertion and exertional dyspnea for the last week. She has had upper/mid abdominal pain that is an achy type of pain. She has had nausea and vomiting and been unable to eat. She has not had a bowel movement in 3 weeks. She has been taking laxatives and enemas without improvement in her constipation.     PMH ADHD, Anxiety, Bipolar depression, COPD, Hep C, PTSD, substance abuse (opioids, 4 years sober), Vit D deficiency.    Current Problems:   Chest pain  -cardiac workup    Abdominal pain  -2/2 constipation, bowel regimen started  -s/p EGD today, colonoscopy tomorrow as poor prep today    Hypokalemia  -replacement protocol    Malnutrition    Mental illnesses  -home meds         Nutrition/Diet History                Nutrition/Diet History    Narrative     Pt in bed at visit, eating a clear liquid tray. Pt states a poor appetite for the past 2-3 months. States some days she may not eat anything and other days may just have a bowl of soup or some rotisserie chicken. Does not take nutrition supplements- does not like. Took  pt a sample of Boost Breeze, did not like. Will reassess with other supplements once diet adv. Reports no BM for 3 weeks PTA, now with +BM with bowel prep.    Pt does have help at home when needed- mother has come to help and her children are 10 and 12.       Functional Status Independent, though needs extra help due to acute issues.       Food Allergies  NKFA       Factors Affecting Nutritional Intake Constipation, poor appetite         Anthropometrics             Anthropometrics    Height 162.6cm, 64in    Weight 43kg, 95lb       Admit Weight    Admit Weight     3/2/2020   Weight 43.4 kg (95 lb 10.9 oz)          Ideal Body Weight (IBW)    Ideal Body Weight (IBW) 120lb    % Ideal Body Weight 79%       Usual Body Weight (UBW)    Usual Body Weight 137-138lb    % Usual Body Weight 69%    Weight Hx        2/24/2020   Weight 43.1 kg (95 lb)       8/26/2019   Weight 57.5 kg (126 lb 12.8 oz)      8/21/2019   Weight 54.9 kg (121 lb 0.5 oz)      7/27/2018   Weight 62.1 kg (137 lb)          Body Mass Index (BMI)    BMI (kg/m2) 16.27 kg/m²           Labs/Medications                Labs    Pertinent Lab Results Comments Ca 8.1 low, Phos 1.2 low, Hgb 8.9 low        Medications    Pertinent Medications Comments Colace, Protonix, Miralax, Phos-Nak, Dulcolax, Zofran, Ambien         Physical Findings                Physical Findings    Overall Physical Appearance NFPE completed this date. See MSA.     Edema  None noted per EMR and exam     Gastrointestinal Last BM 3/4- on bowel prep for colonoscopy     Tubes none     Oral/Mouth Cavity Pt denies chewing issues- states she gets choked on pills at times     Skin No breakdown noted         Estimated/Assessed Needs              Calorie Requirements     Height Used for Calorie Calculations       Weight Used for Calorie Calculations      Formula chosen for Calorie Calculations       Estimated Calorie Requirements (kcals/day)              Protein Requirements    Weight Used For Protein  Calculations       Est Protein Requirement Amount (gms/kg)       Estimated Protein Requirements (gms/day)          Fluid Requirements     Estimated Fluid Requirements (mL/day)            Fluid Deficit       Desired Sodium Level (mEq/L)      Desired Sodium Level (mEq/L)      Estimated Fluid Deficit Needs (L)           Nutrition Prescription Ordered                Nutrition Prescription PO    Current PO Diet  Clear Liquid     Supplement         Nutrition Prescription EN    Enteral Route -     TF modular -     TF Delivery Method -     Current Ordered TF  -     Current Ordered Water flush  -     TF Observation  -        Nutrition Prescription TPN    TPN Route -     Current Ordered TPN Volume  -     Dextrose (gm/kcals)  -     Amino Acid (gm/kcals) -     Lipids (ML/Concentration/Frequency)  -     TPN Observation  -          Evaluation of Received Nutrient/Fluid Intake                PO Evaluation    % PO Intake 25% x1 meal documented        EN Evaluation    TF Changes -     TF Residual -     TF Tolerance      Average EN Delivered  -        TPN Evaluation    Total Number of Days on TPN  -           Clinical Course      Clinical Nutrition Course Details  3/4 Clear Liquid, NPO  3/3 Clear Liquid  3/2 Regular         Problem/Interventions:                     Nutrition Diagnoses Problem 1      Problem 1   Severe chronic illness malnutrition related to inadequate po intake with noted PMH as evidenced by weight loss of 25% in 6.5 months with report of po intake providing less than 50% of nutrition needs for greater than 1 month and s/s of muscle and fat wasting in nutrition focused physical exam.     Nutrition Diagnoses Problem 2      Problem 2            Intervention Goal                Intervention Goal    General Diet advancement    Stable weight         Nutrition Intervention                Nutrition Intervention    RD/Tech Action Revisit for supplement preference (pt does not like Boost Breeze)         Nutrition  Prescription                Nutrition Prescription PO      Diet  Clear Liquid     Supplement         Nutrition Prescription EN    Enteral Prescription -        Nutrition Prescription TPN    TPN Prescription -         Monitor/Evaluation                Monitor/Evaluation    Monitor Diet adv, weight, labs, BM             Electronically signed by:  Lyudmila Turner RD  03/04/20 3:05 PM

## 2020-03-04 NOTE — NURSING NOTE
"Patient on second bowel prep and magnesium citrate. Patient states, \"her stool is loose and brown.\" Will continue to monitor.   "

## 2020-03-04 NOTE — ANESTHESIA PREPROCEDURE EVALUATION
GI CONSULT  NOTE:    Referring Provider:  Dr. Tidwell    Chief complaint: Chest pain, weight loss, constipation     Subjective .     History of present illness: Patient is a 44-year-old female with history of ADHD, anxiety/depression, bipolar, COPD, PTSD, hepatitis C (treatment naïve), substance abuse on Suboxone, and cholecystectomy who presents with complaints of weakness, weight loss, and chest pain.  The patient reports that she has been having chest pain for approximately the past week.  She is unable to describe the pain, but states that it is intermittent.  Unable to identify any exacerbating or alleviating factors.  She does report nausea that worsens with eating.  Also reports occasional vomiting.  Denies any hematemesis or coffee-ground emesis.  States that she does seem to have early satiety and always feels full.  She states that she has been struggling with constipation and reports that her last bowel movement was 3 weeks ago.  She denies any recent change in her Suboxone dose.  No bright red blood per rectum or melena.  She does report a weight loss of approximately 40 pounds in the past 3 months.  Of note, the patient does have a history of hepatitis C which is treatment naïve.  She reports that her last drug use was 4 years ago.      Endo History:  2015 EGD/colonoscopy (Dr. Valentino) -gastritis, empiric dilation to 52 [place], normal colonoscopy, random colon biopsies negative    Past Medical History:  Past Medical History:   Diagnosis Date   • ADHD    • Anxiety    • Bipolar 1 disorder (CMS/HCC)    • COPD (chronic obstructive pulmonary disease) (CMS/HCC)    • Depression    • Hep C w/o coma, chronic (CMS/HCC)    • PTSD (post-traumatic stress disorder)    • Substance abuse (CMS/HCC)    • Vitamin D deficiency        Past Surgical History:  Past Surgical History:   Procedure Laterality Date   •  SECTION     • CHOLECYSTECTOMY     • DILATATION AND CURETTAGE      x2   • TONSILLECTOMY         Social  History:  Social History     Tobacco Use   • Smoking status: Current Every Day Smoker     Packs/day: 1.00     Types: Cigarettes   Substance Use Topics   • Alcohol use: No     Frequency: Never   • Drug use: No       Family History:  Family History   Problem Relation Age of Onset   • Heart disease Mother    • Heart disease Maternal Grandmother    • Heart disease Maternal Grandfather        Medications:    (Not in a hospital admission)    Scheduled Meds:  Continuous Infusions:  No current facility-administered medications for this visit.   PRN Meds:.    ALLERGIES:  Erythromycin base and Lithium    ROS:  Review of Systems   Constitutional: Positive for unexpected weight change. Negative for chills and fever.   HENT: Negative for sore throat and trouble swallowing.    Respiratory: Positive for shortness of breath. Negative for cough.    Cardiovascular: Positive for chest pain. Negative for leg swelling.   Gastrointestinal: Positive for abdominal pain, constipation, nausea and vomiting. Negative for abdominal distention, anal bleeding, blood in stool, diarrhea and rectal pain.   Genitourinary: Negative for dysuria and hematuria.   Musculoskeletal: Positive for back pain. Negative for joint swelling.   Skin: Negative for color change and rash.   Neurological: Positive for syncope. Negative for dizziness and light-headedness.   Psychiatric/Behavioral: Negative for confusion. The patient is not nervous/anxious.        Objective     Vital Signs:   Temp:  [97.5 °F (36.4 °C)-99.2 °F (37.3 °C)] 99.2 °F (37.3 °C)  Heart Rate:  [83-99] 98  Resp:  [16-18] 16  BP: (89-99)/(57-67) 94/57    Physical Exam:      General Appearance:    Awake and alert, in no acute distress   Head:    Normocephalic, without obvious abnormality, atraumatic   Eyes:            Conjunctivae normal, anicteric sclera   Ears:    Ears appear intact with no abnormalities noted   Throat:   No oral lesions, no thrush, oral mucosa moist   Neck:   No adenopathy,  supple, no thyromegaly, no JVD   Lungs:     Clear to auscultation bilaterally, respirations regular, even and unlabored    Heart:    Regular rhythm and normal rate, normal S1 and S2   Chest Wall:    No abnormalities observed   Abdomen:     Hypoactive bowel sounds, soft, non-tender, no rebound or guarding, non-distended    Rectal:     Deferred   Extremities:   Moves all extremities well, no edema, no cyanosis, no             redness   Pulses:   Pulses palpable and equal bilaterally   Skin:   No bleeding, bruising or rash, no jaundice   Lymph nodes:   No palpable adenopathy   Neurologic:   Cranial nerves 2 - 12 grossly intact, no asterixis, sensation intact       Results Review:   I reviewed the patient's labs and imaging.  Lab Results (last 24 hours)     ** No results found for the last 24 hours. **          Imaging Results (Last 24 Hours)     ** No results found for the last 24 hours. **             ASSESSMENT:  -Chest pain  -Constipation -likely secondary to chronic narcotic use  -Unintentional weight loss  -Nausea/vomiting -likely due to constipation  -Chronic hepatitis C -treatment naïve  -Hypokalemia  -Normocytic anemia  -ADHD/anxiety/depression/bipolar/PTSD  -COPD  -History of substance abuse on Suboxone -reports last drug use 4 years ago  -History of cholecystectomy      PLAN:  Patient presents with complaints of chest pain, weakness, and unintentional weight loss of 40 pounds in the past 3 months.  CT abdomen/pelvis without contrast on admission shows a large amount of stool throughout the colon.  We will plan bowel purge with magnesium citrate, Relistor, and Reglan.  Also plan EGD/colonoscopy tomorrow to further evaluate the patient's complaints.  Clear liquid diet.  N.p.o. following bowel prep.  Check B12 level as patient has a reported history of B12 deficiency.  Patient does report a history of hepatitis C.  We will check HCV RNA and genotype.  Could consider outpatient treatment following  discharge.  Start PPI.  Antiemetics/analgesics as needed.  Supportive care.    I discussed the patients findings and my recommendations with the patient.  I will discuss the case with Dr. Lovell and change the plan accordingly.  Elieser Escobar MD  03/04/20  10:33 AM               Anesthesia Evaluation     Patient summary reviewed and Nursing notes reviewed   NPO Solid Status: > 6 hours  NPO Liquid Status: > 6 hours           Airway   Mallampati: II  TM distance: >3 FB  Neck ROM: full  No difficulty expected and Possible difficult intubation  Dental    (+) poor dentition    Pulmonary - normal exam    breath sounds clear to auscultation  (+) COPD, shortness of breath,   Cardiovascular - negative cardio ROS and normal exam    ECG reviewed  Rhythm: regular  Rate: normal        Neuro/Psych  (+) syncope,     (-) dizziness/light headedness  GI/Hepatic/Renal/Endo    (+)   hepatitis, liver disease,     Musculoskeletal     (+) back pain,   (-) joint swelling  Abdominal  - normal exam   Substance History - negative use     OB/GYN          Other                          Anesthesia Plan    ASA 3     MAC     intravenous induction     Anesthetic plan, all risks, benefits, and alternatives have been provided, discussed and informed consent has been obtained with: patient.

## 2020-03-04 NOTE — OP NOTE
COLONOSCOPY, ESOPHAGOGASTRODUODENOSCOPY Procedure Report    Patient Name:  Elizabeth Gómez  YOB: 1975    Date of Surgery:  3/4/2020     Pre-Op Diagnosis:  Abdominal pain, unspecified abdominal location [R10.9]  Constipation, unspecified constipation type [K59.00]  Chest pain, unspecified type [R07.9]  Anemia, unspecified type [D64.9]       Post-Op Diagnosis Codes:     * Abdominal pain, unspecified abdominal location [R10.9]     * Constipation, unspecified constipation type [K59.00]     * Chest pain, unspecified type [R07.9]     * Anemia, unspecified type [D64.9]      Procedure/CPT® Codes:      Procedure(s):  COLONOSCOPY, aborted  ESOPHAGOGASTRODUODENOSCOPY with biopsy X2    Staff:  Surgeon(s):  Parker Lovell MD         Anesthesia: Monitored Anesthesia Care    Implants:    Nothing was implanted during the procedure    Specimen:        See Below    Complications:  Extremely poor bowel prep, aborted colonoscopy at the distal sigmoid colon    Description of Procedure:  Informed consent was obtained for the procedure, including sedation.  Risks of perforation, hemorrhage, adverse drug reaction and aspiration were discussed.  The patient was brought into the endoscopy suite. Continuous cardiopulmonary monitoring was performed. The patient was placed in the left lateral decubitus position.  The bite block was inserted into the patient's mouth. After adequate sedation was attained, the Olympus gastroscope was inserted into the patient's mouth and advanced to the second portion of the duodenum without difficulty.  Circumferential examination was performed. A retroflex exam was performed in the patient's stomach.  On completion of the exam, the bowel was decompressed, the scope was removed from the patient, the patient tolerated the procedure well, there were no immediate post-operative complications.     Examination of the esophagus showed LA grade A erosive esophagitis  Examination of the stomach  showed flattened rugae of the gastric body, biopsies obtained  Retroflex examination of the stomach was normal   Examination of the duodenum showed normal mucosa, biopsies were obtained from the second portion of duodenum the duodenal bulb and sent for pathology evaluate for celiac disease    Subsequently, the colonoscopy was performed with the patient in the left lateral decubitus position. The digital rectal exam was performed which showed brown stool on the glove.  Subsequently, the pediatric Olympus colonoscope was inserted into the patient's rectum and advanced to the level of the distal sigmoid colon.  The bowel prep was extremely poor despite the use of gator  and the procedure was aborted..     Findings:    Poor prep in the colon      Impression:  Flattened rugae of the gastric body status post biopsy  Poor prep in the colon, colonoscopy aborted    Recommendations:  Follow-up histopathology  Repeat bowel purge and plan colonoscopy tomorrow      Parker Lovell MD     Date: 3/4/2020  Time: 1:14 PM

## 2020-03-04 NOTE — ANESTHESIA POSTPROCEDURE EVALUATION
Patient: Elizabeth Gómez    Procedure Summary     Date:  03/04/20 Room / Location:  [facility] ENDOSCOPY 1 / [facility] ENDOSCOPY    Anesthesia Start:  1252 Anesthesia Stop:  1311    Procedures:       COLONOSCOPY (N/A )      ESOPHAGOGASTRODUODENOSCOPY with biopsy X2 (N/A ) Diagnosis:       Abdominal pain, unspecified abdominal location      Constipation, unspecified constipation type      Chest pain, unspecified type      Anemia, unspecified type      (Abdominal pain, unspecified abdominal location [R10.9])      (Constipation, unspecified constipation type [K59.00])      (Chest pain, unspecified type [R07.9])      (Anemia, unspecified type [D64.9])    Surgeon:  Parker Lovell MD Provider:  Misha Honeycutt MD    Anesthesia Type:  MAC ASA Status:  3          Anesthesia Type: MAC    Vitals  Vitals Value Taken Time   BP 99/56 3/4/2020  1:42 PM   Temp     Pulse 91 3/4/2020  1:45 PM   Resp 14 3/4/2020  1:37 PM   SpO2 99 % 3/4/2020  1:45 PM   Vitals shown include unvalidated device data.        Post Anesthesia Care and Evaluation    Patient location during evaluation: PACU  Patient participation: complete - patient participated  Level of consciousness: awake  Pain scale: See nurse's notes for pain score.  Pain management: adequate  Airway patency: patent  Anesthetic complications: No anesthetic complications  PONV Status: none  Cardiovascular status: acceptable  Respiratory status: acceptable  Hydration status: acceptable    Comments: Patient seen and examined postoperatively; vital signs stable; SpO2 greater than or equal to 90%; cardiopulmonary status stable; nausea/vomiting adequately controlled; pain adequately controlled; no apparent anesthesia complications; patient discharged from anesthesia care when discharge criteria were met

## 2020-03-04 NOTE — PLAN OF CARE
Patient alert and oriented and up ad alyse. She is on room air. Patient has no complaints of abdominal pain this morning. Patient had an EGD today. No other complaints or concerns were noted. Will continue to monitor.

## 2020-03-04 NOTE — PLAN OF CARE
Problem: Patient Care Overview  Goal: Plan of Care Review  Outcome: Ongoing (interventions implemented as appropriate)  Flowsheets (Taken 3/4/2020 0231)  Plan of Care Reviewed With: patient  Outcome Summary: Pt resting comfortably in bed. Breathing even and unlabored. Pt NPO as of MN for EGD and colonoscopy this AM. Pt has completed bowel prep and has been having bowel movements. Pt states abdominal relief. Will continue to monitor,     Problem: Patient Care Overview  Goal: Plan of Care Review  Outcome: Ongoing (interventions implemented as appropriate)  Flowsheets (Taken 3/4/2020 0231)  Plan of Care Reviewed With: patient  Outcome Summary: Pt resting comfortably in bed. Breathing even and unlabored. Pt NPO as of MN for EGD and colonoscopy this AM. Pt has completed bowel prep and has been having bowel movements. Pt states abdominal relief. Will continue to monitor,  Goal: Individualization and Mutuality  Outcome: Ongoing (interventions implemented as appropriate)  Goal: Discharge Needs Assessment  Outcome: Ongoing (interventions implemented as appropriate)  Goal: Interprofessional Rounds/Family Conf  Outcome: Ongoing (interventions implemented as appropriate)

## 2020-03-04 NOTE — PROGRESS NOTES
Malnutrition Severity Assessment    Patient Name:  Elizabeth Gómez  YOB: 1975  MRN: [id number]  Admit Date:  3/2/2020    Patient meets criteria for : Severe Malnutrition    Comments:    Severe chronic illness malnutrition related to inadequate po intake with noted PMH as evidenced by weight loss of 25% in 6.5 months with report of po intake providing less than 50% of nutrition needs for greater than 1 month and s/s of muscle and fat wasting in nutrition focused physical exam.     Nutrition interventions: Await diet upgrade pending colonoscopy results. Goal diet, if no findings, is Regular. Will revisit patient once diet advanced in order to trial nutrition supplements.      Malnutrition present on admission.      Malnutrition Severity Assessment  Malnutrition Type: Chronic Disease - Related Malnutrition     Malnutrition Type (last 8 hours)      Malnutrition Severity Assessment     Row Name 03/04/20 1532       Malnutrition Severity Assessment    Malnutrition Type  Chronic Disease - Related Malnutrition    Row Name 03/04/20 1532       Insufficient Energy Intake     Insufficient Energy Intake   <50% of est. energy requirement for >or equal to 1 month    Row Name 03/04/20 1532       Unintentional Weight Loss     Unintentional Weight Loss   Weight loss greater than 10% in six months    Row Name 03/04/20 1532       Muscle Loss    Loss of Muscle Mass Findings  Moderate    Confucianist Region  Moderate - slight depression    Clavicle Bone Region  Moderate - some protrusion in females, visible in males    Acromion Bone Region  Moderate - acromion may slightly protrude    Scapular Bone Region  Moderate - mild depression, bones may show slightly    Dorsal Hand Region  Moderate - slight depression    Patellar Region  Moderate - patella more prominent, less muscle definition around patella    Anterior Thigh Region  Severe - line/depression along thigh, obviously thin    Posterior Calf Region  Moderate - some roundness,  slight firmness    Row Name 03/04/20 1532       Fat Loss    Subcutaneous Fat Loss Findings  Moderate    Orbital Region   Moderate -  somewhat hollowness, slightly dark circles    Upper Arm Region  Moderate - some fat tissue, not ample    Thoracic & Lumbar Region  Moderate - ribs visible with mild depressions, iliac crest somewhat prominent    Row Name 03/04/20 1532       Criteria Met (Must meet criteria for severity in at least 2 of these categories: M Wasting, Fat Loss, Fluid, Secondary Signs, Wt. Status, Intake)    Patient meets criteria for   Severe Malnutrition          Electronically signed by:  Lyudmila Turner RD  03/04/20 3:37 PM

## 2020-03-04 NOTE — PROGRESS NOTES
"      [facility] Daily Progress Note      Hospitalist Team  LOS 2 days      Patient Care Team:  Blanca Welch APRN as PCP - General (Nurse Practitioner)    Patient Location: 232/1      Subjective   Subjective     Chief Complaint / Subjective  Chief Complaint   Patient presents with   • Weakness - Generalized         Brief Synopsis of Hospital Course/HPI   44-year-old female PMH of remote history of substance abuse, currently clean and on Suboxone for 2 years, constipation tendency, hepatitis C, gradual weight loss  , Presenting to ED with a complaint of severe constipation for 3 weeks associated with abdominal pain and discomfort.  Was tried on several laxatives at home and one-time enema without effect.  Never had this worst  constipation in the past.  On admission potassium was 2.4.  He attributes gradual weight loss to poor oral intake due to early satiety and gastric fullness A/W nausea and vomiting.          Date::    3/3; seen by GI . Planned for EGD and colonoscopy. Bowel preparation today.   CT chest, abdomen and pelvis were unremarkable    3/4; EGD Grade A esophagitis, flattened stomach rugae, incomplete colonoscopy, need to repeat tomorrow, biopsy done    ROS   had bowel prep, so moved bowel a lot yesterday. The rest of systems were unremarkable      Objective   Objective      Vital Signs  Temp:  [98.3 °F (36.8 °C)-99.2 °F (37.3 °C)] 99.2 °F (37.3 °C)  Heart Rate:  [] 91  Resp:  [14-17] 14  BP: ()/(34-59) 94/56  Oxygen Therapy  SpO2: 100 %  Pulse Oximetry Type: Continuous  Device (Oxygen Therapy): room air  Device (Oxygen Therapy): room air  Flow (L/min): 3  Flowsheet Rows      First Filed Value   Admission Height  162.6 cm (64\") Documented at 03/02/2020 1312   Admission Weight  43.1 kg (95 lb 0.3 oz) Documented at 03/02/2020 1312        Intake & Output (last 3 days)       03/01 0701 - 03/02 0700 03/02 0701 - 03/03 0700 03/03 0701 - 03/04 0700 03/04 " 0701 - 03/05 0700    P.O.   720     IV Piggyback  500      Total Intake(mL/kg)  500 (11.2) 720 (16.7)     Urine (mL/kg/hr)   800 (0.8) 600 (1.7)    Stool   0 0    Total Output   800 600    Net  +500 -80 -600            Urine Unmeasured Occurrence   3 x     Stool Unmeasured Occurrence   5 x 1 x        Lines, Drains & Airways    Active LDAs     Name:   Placement date:   Placement time:   Site:   Days:    Peripheral IV 03/02/20 1425 Left Arm   03/02/20    1425    Arm   1                  Physical Exam:    Physical Exam    Constitutional: She is oriented to person, place, and time. She appears well-developed and well-nourished. No distress.   HENT:   Head: Normocephalic and atraumatic.   Nose: Nose normal.   Eyes: Pupils are equal, round, and reactive to light. Conjunctivae and EOM are normal.   Neck: Normal range of motion.   Cardiovascular: Normal rate, regular rhythm, normal heart sounds and intact distal pulses.   Pulmonary/Chest: Effort normal and breath sounds normal. No stridor. No respiratory distress.   Abdominal: Soft. Bowel sounds are normal. Not tender or distended. There is no guarding.   Musculoskeletal: Normal range of motion. She exhibits no edema, tenderness or deformity.   Neurological: She is alert and oriented to person, place, and time. No cranial nerve deficit.   Skin: Skin is warm and dry. No rash noted. She is not diaphoretic. No erythema.   Psychiatric: She has a normal mood and affect. Her behavior is normal.   Nursing note and vitals reviewed.      Procedures:    Procedure(s):  COLONOSCOPY  ESOPHAGOGASTRODUODENOSCOPY          Results Review:     I reviewed the patient's new clinical results.      Lab Results (last 24 hours)     Procedure Component Value Units Date/Time    Tissue Pathology Exam [[id number]] Collected:  03/04/20 1302    Specimen:  Tissue from Gastric, Body; Tissue from Small Intestine Updated:  03/04/20 1444    Vitamin D 25 Hydroxy [[id number]]  (Normal) Collected:  03/04/20 0322      Specimen:  Blood Updated:  03/04/20 1112     25 Hydroxy, Vitamin D 46.8 ng/ml     Narrative:       Reference Range for Total Vitamin D 25(OH)     Deficiency <20.0 ng/mL   Insufficiency 21-29 ng/mL   Sufficiency  ng/mL  Toxicity >100 ng/ml    Results may be falsely increased if patient taking Biotin.      Basic Metabolic Panel [[id number]]  (Abnormal) Collected:  03/04/20 0322    Specimen:  Blood Updated:  03/04/20 0438     Glucose 110 mg/dL      BUN 7 mg/dL      Creatinine 0.80 mg/dL      Sodium 144 mmol/L      Potassium 3.9 mmol/L      Chloride 126 mmol/L      CO2 10.0 mmol/L      Calcium 8.1 mg/dL      eGFR Non African Amer 79 mL/min/1.73      BUN/Creatinine Ratio 8.8     Anion Gap 8.0 mmol/L     Narrative:       GFR Normal >60  Chronic Kidney Disease <60  Kidney Failure <15      Phosphorus [[id number]]  (Abnormal) Collected:  03/04/20 0322    Specimen:  Blood Updated:  03/04/20 0438     Phosphorus 1.2 mg/dL         Hemoglobin A1C   Date Value Ref Range Status   03/03/2020 4.9 3.5 - 5.6 % Final               Lab Results   Component Value Date    LIPASE 190 (H) 02/24/2020     Lab Results   Component Value Date    CHOL 82 03/03/2020    TRIG 63 03/03/2020    HDL 30 (L) 03/03/2020    LDL 39 03/03/2020       No results found for: INTRAOP, PREDX, FINALDX, COMDX    Microbiology Results (last 10 days)     Procedure Component Value - Date/Time    Urine Culture - Urine, Urine, Clean Catch [[id number]]  (Normal) Collected:  02/24/20 1517    Lab Status:  Final result Specimen:  Urine, Clean Catch Updated:  02/26/20 0347     Urine Culture No growth          ECG/EMG Results (most recent)     Procedure Component Value Units Date/Time    ECG 12 Lead [[id number]] Resulted:  03/02/20 1536     Updated:  03/02/20 1536    ECG 12 Lead [[id number]] Collected:  03/02/20 1317     Updated:  03/02/20 1536    Narrative:       HEART RATE= 94  bpm  RR Interval= 636  ms  WI Interval= 150  ms  P Horizontal Axis= 12  deg  P Front Axis= 79   deg  QRSD Interval= 99  ms  QT Interval= 405  ms  QRS Axis= 43  deg  T Wave Axis= 239  deg  - ABNORMAL ECG -  Sinus rhythm  Repol abnrm suggests ischemia, diffuse leads  When compared with ECG of 24-Feb-2020 13:13:51,  Significant repolarization change  Electronically Signed By: Reno Cortez (Huber) 02-Mar-2020 15:36:39  Date and Time of Study: 2020-03-02 13:17:19          Results for orders placed during the hospital encounter of 08/23/19   Duplex Carotid Ultrasound CAR    Narrative · Proximal right internal carotid artery mild stenosis.  · Proximal left internal carotid artery mild stenosis.          Results for orders placed during the hospital encounter of 08/23/19   Adult Transthoracic Echo Complete W/ Cont if Necessary Per Protocol    Narrative · Estimated EF = 60%.  · Left ventricular systolic function is normal.       Indications  Syncope    Technically satisfactory study.  Mitral valve is structurally normal.  Tricuspid valve is structurally normal.  Aortic valve is structurally normal.  Pulmonic valve could not be well visualized.  No evidence for mitral tricuspid or aortic regurgitation is seen by   Doppler study.  Left atrium is normal in size.  Right atrium is normal in size.  Left ventricle is normal in size and contractility with ejection fraction   of 60%.  Right ventricle is normal in size.  Atrial septum is intact.  Aorta is normal.  No pericardial effusion or intracardiac thrombus is seen.    Impression  Structurally and functionally normal cardiac valves.  Normal echo Doppler study.  Left ventricular ejection fraction is 60%.           Ct Abdomen Pelvis Without Contrast    Result Date: 3/2/2020   1.  Large amount of high density stool throughout the colon which is distended but improved from prior study as detailed above.  Findings would be compatible with patient's history of constipation.  No evidence of bowel obstruction. 2.  Small areas of groundglass airspace disease in both lung bases  which is nonspecific and may be infectious or inflammatory in etiology.  Electronically Signed By-Tony Leon On:3/2/2020 3:14 PM This report was finalized on [id number] by  Tony Leon, .    Ct Chest With Contrast    Result Date: 3/3/2020   1.  Borderline enlarged right hilar nodes measuring 1.8 x 1.2 cm.  No other enlarged mediastinal, hilar, or axillary lymph nodes. 2.  No focal airspace consolidation or pleural effusion. 3.  Stable small area of nodular pleural thickening along the undersurface of the minor fissure within the right middle lobe.  Electronically Signed By-Tony Leon On:3/3/2020 11:18 AM This report was finalized on [id number] by  Tony Leon, .    Xr Chest 1 View    Result Date: 3/2/2020  No acute chest findings.  Electronically Signed By-Dr. Heather Romo MD On:3/2/2020 2:33 PM This report was finalized on [id number] by Dr. Heather Romo MD.          Xrays, labs reviewed personally by physician.    Medication Review:   I have reviewed the patient's current medication list      Scheduled Meds    buprenorphine-naloxone 0.5 tablet Sublingual 4x Daily   busPIRone 30 mg Oral Q12H   docusate sodium 100 mg Oral BID   fluticasone 2 spray Nasal Daily   ipratropium-albuterol 3 mL Nebulization Q8H - RT   lamoTRIgine 200 mg Oral Nightly   OLANZapine 20 mg Oral Nightly   pantoprazole 40 mg Intravenous QAM AC   polyethylene glycol 17 g Oral Daily   potassium & sodium phosphates 1 packet Oral 4x Daily AC & at Bedtime   sodium chloride 10 mL Intravenous Q12H   sodium chloride 3 mL Intravenous Q12H   [START ON 3/5/2020] sorbitol 50 mL Oral BID   venlafaxine  mg Oral Daily       Meds Infusions    sodium chloride 9 mL/hr       Meds PRN  •  acetaminophen **OR** acetaminophen **OR** acetaminophen  •  aluminum-magnesium hydroxide-simethicone  •  bisacodyl  •  bisacodyl  •  Calcium Gluconate-NaCl **AND** calcium gluconate **AND** Calcium, Ionized  •  magnesium hydroxide  •  magnesium  sulfate **OR** magnesium sulfate in D5W 1g/100mL (PREMIX)  •  melatonin  •  ondansetron **OR** ondansetron  •  potassium chloride **OR** potassium chloride **OR** potassium chloride  •  [COMPLETED] Insert peripheral IV **AND** sodium chloride  •  sodium chloride  •  sodium chloride  •  sodium chloride  •  zolpidem    I personally reviewed patient's x-ray films and my findings are same    I personally reviewed patient's EKG strips and my findings are same    Assessment/Plan   Assessment/Plan     Active Hospital Problems    Diagnosis  POA   • **Chest pain [R07.9]  Yes   • Hypokalemia [E87.6]  Yes   • Abdominal pain [R10.9]  Yes   • PTSD (post-traumatic stress disorder) [F43.10]  Yes   • Hep C w/o coma, chronic (CMS/HCC) [B18.2]  Yes   • Severe malnutrition (CMS/HCC) [E43]  Yes   • Constipation [K59.00]  Yes   • Anemia [D64.9]  Unknown   • Vitamin B12 deficiency [E53.8]  Yes   • Bipolar I disorder, most recent episode depressed (CMS/HCC) [F31.30]  Yes   • Generalized anxiety disorder [F41.1]  Yes      Resolved Hospital Problems   No resolved problems to display.       MEDICAL DECISION MAKING COMPLEXITY BY PROBLEM:   Severe constipation  ; Likely multifactorial, due to opiate and poor oral intake and possible dehydration  ; On bowel preparation for colonoscopy- resolved    Multiple electrolyte imbalance  ; Replace potassium and phos- still low 1.2 on po phos- will give k phos iv once, 20mmol  ; normal vit D level, possibly aggravated by bowel prep and maybe still low given another bowel prep today      Chronic nausea and vomiting, easy satiety  ; EGD - grade A esophagitis, flat stomach rugae with biopsy pending  ; On PPI      Chronic anemia  ; due to KENAN and ACD give hep C      Gradual weight loss and cachexia due to poor oral intake  ; There is no mass detected in CT of the chest, abdomen and pelvis  ; EGD and incomplete colonoscopy today- repeat tomorrow    Chronic Suboxone use due to substance abuse in the  past    Hepatitis C    : CT shows normal liver    Severe cachexia with a moderate protein  calorie malnutrition    Bipolar disorder/a PTSD  ; On Zyprexa, Lamictal, Effexor, BuSpar      VTE Prophylaxis   Mechanical Order History:      Ordered        03/02/20 1640  Place Sequential Compression Device  Once         03/02/20 1640  Maintain Sequential Compression Device  Continuous                 Pharmalogical Order History:     None            Code Status -   Code Status and Medical Interventions:   Ordered at: 03/02/20 1640     Level Of Support Discussed With:    Patient     Code Status:    CPR     Medical Interventions (Level of Support Prior to Arrest):    Full       Discharge Planning  After the EGD and colonoscopy and constipation resolved        Destination      Coordination has not been started for this encounter.      Durable Medical Equipment      Coordination has not been started for this encounter.      Dialysis/Infusion      Coordination has not been started for this encounter.      Home Medical Care      Coordination has not been started for this encounter.      Therapy      Coordination has not been started for this encounter.      Community Resources      Coordination has not been started for this encounter.            Electronically signed by Caroline Crowley MD, 03/04/20, 3:00 PM.  [facility] Hospitalist Team

## 2020-03-05 ENCOUNTER — ANESTHESIA (OUTPATIENT)
Dept: GASTROENTEROLOGY | Facility: HOSPITAL | Age: 45
End: 2020-03-05

## 2020-03-05 ENCOUNTER — ANESTHESIA EVENT (OUTPATIENT)
Dept: GASTROENTEROLOGY | Facility: HOSPITAL | Age: 45
End: 2020-03-05

## 2020-03-05 VITALS — HEART RATE: 81 BPM | DIASTOLIC BLOOD PRESSURE: 50 MMHG | SYSTOLIC BLOOD PRESSURE: 102 MMHG | OXYGEN SATURATION: 100 %

## 2020-03-05 LAB
LAB AP CASE REPORT: NORMAL
PATH REPORT.FINAL DX SPEC: NORMAL
PATH REPORT.GROSS SPEC: NORMAL
PHOSPHATE SERPL-MCNC: 2.6 MG/DL (ref 2.5–4.5)
POTASSIUM BLD-SCNC: 3.1 MMOL/L (ref 3.5–5.2)

## 2020-03-05 PROCEDURE — 84132 ASSAY OF SERUM POTASSIUM: CPT | Performed by: INTERNAL MEDICINE

## 2020-03-05 PROCEDURE — 45378 DIAGNOSTIC COLONOSCOPY: CPT | Performed by: INTERNAL MEDICINE

## 2020-03-05 PROCEDURE — 25010000002 PROPOFOL 10 MG/ML EMULSION: Performed by: NURSE ANESTHETIST, CERTIFIED REGISTERED

## 2020-03-05 PROCEDURE — 84100 ASSAY OF PHOSPHORUS: CPT | Performed by: INTERNAL MEDICINE

## 2020-03-05 PROCEDURE — 94799 UNLISTED PULMONARY SVC/PX: CPT

## 2020-03-05 PROCEDURE — 25010000003 POTASSIUM CHLORIDE 10 MEQ/100ML SOLUTION: Performed by: INTERNAL MEDICINE

## 2020-03-05 PROCEDURE — 0DJD8ZZ INSPECTION OF LOWER INTESTINAL TRACT, VIA NATURAL OR ARTIFICIAL OPENING ENDOSCOPIC: ICD-10-PCS | Performed by: INTERNAL MEDICINE

## 2020-03-05 PROCEDURE — 99232 SBSQ HOSP IP/OBS MODERATE 35: CPT | Performed by: INTERNAL MEDICINE

## 2020-03-05 RX ORDER — SODIUM CHLORIDE 0.9 % (FLUSH) 0.9 %
10 SYRINGE (ML) INJECTION AS NEEDED
Status: DISCONTINUED | OUTPATIENT
Start: 2020-03-05 | End: 2020-03-05 | Stop reason: HOSPADM

## 2020-03-05 RX ORDER — LABETALOL HYDROCHLORIDE 5 MG/ML
5 INJECTION, SOLUTION INTRAVENOUS
Status: DISCONTINUED | OUTPATIENT
Start: 2020-03-05 | End: 2020-03-05 | Stop reason: HOSPADM

## 2020-03-05 RX ORDER — PROMETHAZINE HYDROCHLORIDE 25 MG/1
25 TABLET ORAL ONCE AS NEEDED
Status: DISCONTINUED | OUTPATIENT
Start: 2020-03-05 | End: 2020-03-05 | Stop reason: HOSPADM

## 2020-03-05 RX ORDER — PHENYLEPHRINE HCL IN 0.9% NACL 0.5 MG/5ML
SYRINGE (ML) INTRAVENOUS AS NEEDED
Status: DISCONTINUED | OUTPATIENT
Start: 2020-03-05 | End: 2020-03-05 | Stop reason: SURG

## 2020-03-05 RX ORDER — HYDRALAZINE HYDROCHLORIDE 20 MG/ML
5 INJECTION INTRAMUSCULAR; INTRAVENOUS
Status: DISCONTINUED | OUTPATIENT
Start: 2020-03-05 | End: 2020-03-05 | Stop reason: HOSPADM

## 2020-03-05 RX ORDER — ONDANSETRON 2 MG/ML
4 INJECTION INTRAMUSCULAR; INTRAVENOUS ONCE AS NEEDED
Status: DISCONTINUED | OUTPATIENT
Start: 2020-03-05 | End: 2020-03-05 | Stop reason: SDUPTHER

## 2020-03-05 RX ORDER — PROMETHAZINE HYDROCHLORIDE 25 MG/ML
6.25 INJECTION, SOLUTION INTRAMUSCULAR; INTRAVENOUS ONCE AS NEEDED
Status: DISCONTINUED | OUTPATIENT
Start: 2020-03-05 | End: 2020-03-05 | Stop reason: HOSPADM

## 2020-03-05 RX ORDER — ACETAMINOPHEN 325 MG/1
650 TABLET ORAL ONCE AS NEEDED
Status: DISCONTINUED | OUTPATIENT
Start: 2020-03-05 | End: 2020-03-05 | Stop reason: SDUPTHER

## 2020-03-05 RX ORDER — PHENYLEPHRINE HCL IN 0.9% NACL 0.5 MG/5ML
.5-3 SYRINGE (ML) INTRAVENOUS
Status: DISCONTINUED | OUTPATIENT
Start: 2020-03-05 | End: 2020-03-05 | Stop reason: HOSPADM

## 2020-03-05 RX ORDER — IPRATROPIUM BROMIDE AND ALBUTEROL SULFATE 2.5; .5 MG/3ML; MG/3ML
3 SOLUTION RESPIRATORY (INHALATION) ONCE AS NEEDED
Status: DISCONTINUED | OUTPATIENT
Start: 2020-03-05 | End: 2020-03-05 | Stop reason: HOSPADM

## 2020-03-05 RX ORDER — EPHEDRINE SULFATE 50 MG/ML
5 INJECTION, SOLUTION INTRAVENOUS ONCE AS NEEDED
Status: DISCONTINUED | OUTPATIENT
Start: 2020-03-05 | End: 2020-03-05 | Stop reason: HOSPADM

## 2020-03-05 RX ORDER — ONDANSETRON 4 MG/1
4 TABLET, FILM COATED ORAL EVERY 6 HOURS PRN
Status: DISCONTINUED | OUTPATIENT
Start: 2020-03-05 | End: 2020-03-06 | Stop reason: HOSPADM

## 2020-03-05 RX ORDER — SODIUM CHLORIDE 0.9 % (FLUSH) 0.9 %
3 SYRINGE (ML) INJECTION EVERY 12 HOURS SCHEDULED
Status: DISCONTINUED | OUTPATIENT
Start: 2020-03-05 | End: 2020-03-05 | Stop reason: HOSPADM

## 2020-03-05 RX ORDER — ONDANSETRON 2 MG/ML
4 INJECTION INTRAMUSCULAR; INTRAVENOUS EVERY 6 HOURS PRN
Status: DISCONTINUED | OUTPATIENT
Start: 2020-03-05 | End: 2020-03-06 | Stop reason: HOSPADM

## 2020-03-05 RX ORDER — LIDOCAINE HYDROCHLORIDE 20 MG/ML
INJECTION, SOLUTION EPIDURAL; INFILTRATION; INTRACAUDAL; PERINEURAL AS NEEDED
Status: DISCONTINUED | OUTPATIENT
Start: 2020-03-05 | End: 2020-03-05 | Stop reason: SURG

## 2020-03-05 RX ORDER — PROPOFOL 10 MG/ML
VIAL (ML) INTRAVENOUS AS NEEDED
Status: DISCONTINUED | OUTPATIENT
Start: 2020-03-05 | End: 2020-03-05 | Stop reason: SURG

## 2020-03-05 RX ORDER — PROMETHAZINE HYDROCHLORIDE 25 MG/1
25 SUPPOSITORY RECTAL ONCE AS NEEDED
Status: DISCONTINUED | OUTPATIENT
Start: 2020-03-05 | End: 2020-03-05 | Stop reason: HOSPADM

## 2020-03-05 RX ORDER — ACETAMINOPHEN 650 MG/1
650 SUPPOSITORY RECTAL ONCE AS NEEDED
Status: DISCONTINUED | OUTPATIENT
Start: 2020-03-05 | End: 2020-03-05 | Stop reason: SDUPTHER

## 2020-03-05 RX ADMIN — FLUTICASONE PROPIONATE 2 SPRAY: 50 SPRAY, METERED NASAL at 09:23

## 2020-03-05 RX ADMIN — VENLAFAXINE HYDROCHLORIDE 150 MG: 75 CAPSULE, EXTENDED RELEASE ORAL at 09:19

## 2020-03-05 RX ADMIN — IPRATROPIUM BROMIDE AND ALBUTEROL SULFATE 3 ML: .5; 3 SOLUTION RESPIRATORY (INHALATION) at 00:16

## 2020-03-05 RX ADMIN — PHENYLEPHRINE HYDROCHLORIDE 200 MCG: 10 INJECTION INTRAVENOUS at 15:04

## 2020-03-05 RX ADMIN — OLANZAPINE 20 MG: 10 TABLET, FILM COATED ORAL at 20:52

## 2020-03-05 RX ADMIN — LAMOTRIGINE 200 MG: 100 TABLET ORAL at 20:52

## 2020-03-05 RX ADMIN — BUPRENORPHINE AND NALOXONE 0.5 TABLET: 8; 2 TABLET SUBLINGUAL at 09:18

## 2020-03-05 RX ADMIN — SORBITOL SOLUTION (BULK) 30 ML: 70 SOLUTION at 03:50

## 2020-03-05 RX ADMIN — BUPRENORPHINE AND NALOXONE 0.5 TABLET: 8; 2 TABLET SUBLINGUAL at 17:13

## 2020-03-05 RX ADMIN — POTASSIUM & SODIUM PHOSPHATES POWDER PACK 280-160-250 MG 1 PACKET: 280-160-250 PACK at 17:13

## 2020-03-05 RX ADMIN — DOCUSATE SODIUM 100 MG: 100 CAPSULE, LIQUID FILLED ORAL at 20:52

## 2020-03-05 RX ADMIN — SORBITOL SOLUTION (BULK) 50 ML: 70 SOLUTION at 05:05

## 2020-03-05 RX ADMIN — LIDOCAINE HYDROCHLORIDE 100 MG: 20 INJECTION, SOLUTION EPIDURAL; INFILTRATION; INTRACAUDAL; PERINEURAL at 14:48

## 2020-03-05 RX ADMIN — Medication 10 ML: at 20:54

## 2020-03-05 RX ADMIN — Medication 10 ML: at 09:23

## 2020-03-05 RX ADMIN — PROPOFOL 100 MG: 10 INJECTION, EMULSION INTRAVENOUS at 14:48

## 2020-03-05 RX ADMIN — POTASSIUM CHLORIDE 10 MEQ: 10 INJECTION, SOLUTION INTRAVENOUS at 09:18

## 2020-03-05 RX ADMIN — BUSPIRONE HYDROCHLORIDE 30 MG: 15 TABLET ORAL at 09:19

## 2020-03-05 RX ADMIN — IPRATROPIUM BROMIDE AND ALBUTEROL SULFATE 3 ML: .5; 3 SOLUTION RESPIRATORY (INHALATION) at 16:17

## 2020-03-05 RX ADMIN — PANTOPRAZOLE SODIUM 40 MG: 40 TABLET, DELAYED RELEASE ORAL at 05:05

## 2020-03-05 RX ADMIN — Medication 3 ML: at 09:24

## 2020-03-05 RX ADMIN — BUSPIRONE HYDROCHLORIDE 30 MG: 15 TABLET ORAL at 20:52

## 2020-03-05 RX ADMIN — DOCUSATE SODIUM 100 MG: 100 CAPSULE, LIQUID FILLED ORAL at 09:19

## 2020-03-05 RX ADMIN — BUPRENORPHINE AND NALOXONE 0.5 TABLET: 8; 2 TABLET SUBLINGUAL at 11:22

## 2020-03-05 RX ADMIN — SODIUM CHLORIDE 9 ML/HR: 0.9 INJECTION, SOLUTION INTRAVENOUS at 12:12

## 2020-03-05 RX ADMIN — ZOLPIDEM TARTRATE 5 MG: 5 TABLET, COATED ORAL at 20:52

## 2020-03-05 RX ADMIN — BUPRENORPHINE AND NALOXONE 0.5 TABLET: 8; 2 TABLET SUBLINGUAL at 20:53

## 2020-03-05 NOTE — PROGRESS NOTES
"      [facility] Medicine Services Daily Progress Note      Hospitalist Team  LOS 3 days      Patient Care Team:  Blanca Welch APRN as PCP - General (Nurse Practitioner)    Patient Location: 232/1      Subjective   Subjective     Chief Complaint / Subjective  Chief Complaint   Patient presents with   • Weakness - Generalized         Brief Synopsis of Hospital Course/HPI   44-year-old female PMH of remote history of substance abuse, currently clean and on Suboxone for 2 years, constipation tendency, hepatitis C, gradual weight loss  , Presenting to ED with a complaint of severe constipation for 3 weeks associated with abdominal pain and discomfort.  Was tried on several laxatives at home and one-time enema without effect.  Never had this worst  constipation in the past.  On admission potassium was 2.4.  He attributes gradual weight loss to poor oral intake due to early satiety and gastric fullness A/W nausea and vomiting.          Date::    3/3; seen by GI . Planned for EGD and colonoscopy. Bowel preparation today.   CT chest, abdomen and pelvis were unremarkable    3/4; EGD Grade A esophagitis, flattened stomach rugae, incomplete colonoscopy, need to repeat tomorrow, biopsy done    3/5; repeat colonoscopy today did not show any gross mass.    ROS   had bowel prep, so moved bowel a lot. The rest of systems were unremarkable      Objective   Objective      Vital Signs  Temp:  [97.7 °F (36.5 °C)-98.6 °F (37 °C)] 98.6 °F (37 °C)  Heart Rate:  [] 84  Resp:  [12-16] 14  BP: ()/(28-66) 97/58  Oxygen Therapy  SpO2: 100 %  Pulse Oximetry Type: Intermittent  Device (Oxygen Therapy): room air  Device (Oxygen Therapy): room air  Flow (L/min): 3  Oximetry Probe Site Changed: No  Flowsheet Rows      First Filed Value   Admission Height  162.6 cm (64\") Documented at 03/02/2020 1312   Admission Weight  43.1 kg (95 lb 0.3 oz) Documented at 03/02/2020 1312        Intake & Output (last 3 days)   "      03/02 0701 - 03/03 0700 03/03 0701 - 03/04 0700 03/04 0701 - 03/05 0700 03/05 0701 - 03/06 0700    P.O.  720 600 480    I.V. (mL/kg)    200 (4.6)    IV Piggyback 500       Total Intake(mL/kg) 500 (11.2) 720 (16.7) 600 (13.9) 680 (15.8)    Urine (mL/kg/hr)  800 (0.8) 1600 (1.5)     Stool  0 0     Total Output  800 1600     Net +500 -80 -1000 +680            Urine Unmeasured Occurrence  3 x      Stool Unmeasured Occurrence  5 x 1 x         Lines, Drains & Airways    Active LDAs     Name:   Placement date:   Placement time:   Site:   Days:    Peripheral IV 03/02/20 1425 Left Arm   03/02/20 1425    Arm   1                  Physical Exam:    Physical Exam    Constitutional: She is oriented to person, place, and time. She appears well-developed and well-nourished. No distress.   HENT:   Head: Normocephalic and atraumatic.   Nose: Nose normal.   Eyes: Pupils are equal, round, and reactive to light. Conjunctivae and EOM are normal.   Neck: Normal range of motion.   Cardiovascular: Normal rate, regular rhythm, normal heart sounds and intact distal pulses.   Pulmonary/Chest: Effort normal and breath sounds normal. No stridor. No respiratory distress.   Abdominal: Soft. Bowel sounds are normal. Not tender or distended. There is no guarding.   Musculoskeletal: Normal range of motion. She exhibits no edema, tenderness or deformity.   Neurological: She is alert and oriented to person, place, and time. No cranial nerve deficit.   Skin: Skin is warm and dry. No rash noted. She is not diaphoretic. No erythema.   Psychiatric: She has a normal mood and affect. Her behavior is normal.   Nursing note and vitals reviewed.      Procedures:    Procedure(s):  COLONOSCOPY  ESOPHAGOGASTRODUODENOSCOPY          Results Review:     I reviewed the patient's new clinical results.      Lab Results (last 24 hours)     Procedure Component Value Units Date/Time    Tissue Pathology Exam [[id number]] Collected:  03/04/20 1302    Specimen:  Tissue  "from Gastric, Body; Tissue from Small Intestine Updated:  03/05/20 1538     Case Report --     Surgical Pathology Report                         Case: [id number]                                  Authorizing Provider:  Parker Lovell MD   Collected:           03/04/2020 01:02 PM          Ordering Location:     [facility]  Received:            03/04/2020 02:44 PM                                 SUITES                                                                       Pathologist:           Bryant Marmolejo MD                                                             Specimens:   1) - Gastric, Body, biopsy X1                                                                       2) - Small Intestine, R/O celiac                                                            Final Diagnosis --     Specimen #1 (Stomach, biopsy):    Chronic gastritis    H. pylori immunostain negative for Helicobacter organisms (control reacts appropriately)     Specimen #2 (Duodenum, biopsy):    Benign duodenal mucosa with preserved villous architecture and no significant histopathology     Negative for celiac disease    JPR/sms        Gross Description --     Two specimens are received.    Specimen #1 is labeled \"Gastric body\". Received in formalin are three fragments of pink tan soft tissue, the largest which measures 0.3 cm. The specimen is filtered and submitted in its entirety in screen cassette 1.    Specimen #2 is labeled \"Small intestine\". Received in formalin are two fragments of pink tan soft tissue, the largest which measures 0.4 cm. The specimen is filtered and submitted in its entirety in screen cassette 2.      JPR/cec      Phosphorus [[id number]]  (Normal) Collected:  03/05/20 0500    Specimen:  Blood Updated:  03/05/20 0542     Phosphorus 2.6 mg/dL     Potassium [[id number]]  (Abnormal) Collected:  03/05/20 0500    Specimen:  Blood Updated:  03/05/20 0542     Potassium 3.1 mmol/L         Hemoglobin A1C   "   Date Value Ref Range Status   03/03/2020 4.9 3.5 - 5.6 % Final               Lab Results   Component Value Date    LIPASE 190 (H) 02/24/2020     Lab Results   Component Value Date    CHOL 82 03/03/2020    TRIG 63 03/03/2020    HDL 30 (L) 03/03/2020    LDL 39 03/03/2020       Lab Results   Lab Value Date/Time    FINALDX  03/04/2020 1302     Specimen #1 (Stomach, biopsy):    Chronic gastritis    H. pylori immunostain negative for Helicobacter organisms (control reacts appropriately)     Specimen #2 (Duodenum, biopsy):    Benign duodenal mucosa with preserved villous architecture and no significant histopathology     Negative for celiac disease    JPR/sms          Microbiology Results (last 10 days)     ** No results found for the last 240 hours. **          ECG/EMG Results (most recent)     Procedure Component Value Units Date/Time    ECG 12 Lead [[id number]] Resulted:  03/02/20 1536     Updated:  03/02/20 1536    ECG 12 Lead [[id number]] Collected:  03/02/20 1317     Updated:  03/02/20 1536    Narrative:       HEART RATE= 94  bpm  RR Interval= 636  ms  TN Interval= 150  ms  P Horizontal Axis= 12  deg  P Front Axis= 79  deg  QRSD Interval= 99  ms  QT Interval= 405  ms  QRS Axis= 43  deg  T Wave Axis= 239  deg  - ABNORMAL ECG -  Sinus rhythm  Repol abnrm suggests ischemia, diffuse leads  When compared with ECG of 24-Feb-2020 13:13:51,  Significant repolarization change  Electronically Signed By: Reno Cortez (Huber) 02-Mar-2020 15:36:39  Date and Time of Study: 2020-03-02 13:17:19          Results for orders placed during the hospital encounter of 08/23/19   Duplex Carotid Ultrasound CAR    Narrative · Proximal right internal carotid artery mild stenosis.  · Proximal left internal carotid artery mild stenosis.          Results for orders placed during the hospital encounter of 08/23/19   Adult Transthoracic Echo Complete W/ Cont if Necessary Per Protocol    Narrative · Estimated EF = 60%.  · Left ventricular systolic  function is normal.       Indications  Syncope    Technically satisfactory study.  Mitral valve is structurally normal.  Tricuspid valve is structurally normal.  Aortic valve is structurally normal.  Pulmonic valve could not be well visualized.  No evidence for mitral tricuspid or aortic regurgitation is seen by   Doppler study.  Left atrium is normal in size.  Right atrium is normal in size.  Left ventricle is normal in size and contractility with ejection fraction   of 60%.  Right ventricle is normal in size.  Atrial septum is intact.  Aorta is normal.  No pericardial effusion or intracardiac thrombus is seen.    Impression  Structurally and functionally normal cardiac valves.  Normal echo Doppler study.  Left ventricular ejection fraction is 60%.           Ct Abdomen Pelvis Without Contrast    Result Date: 3/2/2020   1.  Large amount of high density stool throughout the colon which is distended but improved from prior study as detailed above.  Findings would be compatible with patient's history of constipation.  No evidence of bowel obstruction. 2.  Small areas of groundglass airspace disease in both lung bases which is nonspecific and may be infectious or inflammatory in etiology.  Electronically Signed By-Tony Leon On:3/2/2020 3:14 PM This report was finalized on [id number] by  Tony Leon, .    Ct Chest With Contrast    Result Date: 3/3/2020   1.  Borderline enlarged right hilar nodes measuring 1.8 x 1.2 cm.  No other enlarged mediastinal, hilar, or axillary lymph nodes. 2.  No focal airspace consolidation or pleural effusion. 3.  Stable small area of nodular pleural thickening along the undersurface of the minor fissure within the right middle lobe.  Electronically Signed By-Tony Leon On:3/3/2020 11:18 AM This report was finalized on [id number] by  Tony Leon, .    Xr Chest 1 View    Result Date: 3/2/2020  No acute chest findings.  Electronically Signed By-Dr. Heather Romo MD  On:3/2/2020 2:33 PM This report was finalized on [id number] by Dr. Heather Romo MD.          Xrays, labs reviewed personally by physician.    Medication Review:   I have reviewed the patient's current medication list      Scheduled Meds    buprenorphine-naloxone 0.5 tablet Sublingual 4x Daily   busPIRone 30 mg Oral Q12H   docusate sodium 100 mg Oral BID   fluticasone 2 spray Nasal Daily   ipratropium-albuterol 3 mL Nebulization Q8H - RT   lamoTRIgine 200 mg Oral Nightly   OLANZapine 20 mg Oral Nightly   pantoprazole 40 mg Oral Q AM   polyethylene glycol 17 g Oral Daily   potassium & sodium phosphates 1 packet Oral 4x Daily AC & at Bedtime   sodium chloride 10 mL Intravenous Q12H   venlafaxine  mg Oral Daily       Meds Infusions    sodium chloride 9 mL/hr Last Rate: 9 mL/hr (03/05/20 1212)       Meds PRN  •  acetaminophen **OR** acetaminophen **OR** acetaminophen  •  aluminum-magnesium hydroxide-simethicone  •  bisacodyl  •  bisacodyl  •  Calcium Gluconate-NaCl **AND** calcium gluconate **AND** Calcium, Ionized  •  magnesium hydroxide  •  magnesium sulfate **OR** magnesium sulfate in D5W 1g/100mL (PREMIX)  •  melatonin  •  ondansetron **OR** ondansetron  •  ondansetron **OR** ondansetron  •  ondansetron **OR** ondansetron  •  potassium chloride **OR** potassium chloride **OR** potassium chloride  •  [COMPLETED] Insert peripheral IV **AND** sodium chloride  •  sodium chloride  •  sodium chloride  •  zolpidem    I personally reviewed patient's x-ray films and my findings are same    I personally reviewed patient's EKG strips and my findings are same    Assessment/Plan   Assessment/Plan     Active Hospital Problems    Diagnosis  POA   • **Chest pain [R07.9]  Yes   • Hypokalemia [E87.6]  Yes   • Abdominal pain [R10.9]  Yes   • PTSD (post-traumatic stress disorder) [F43.10]  Yes   • Hep C w/o coma, chronic (CMS/HCC) [B18.2]  Yes   • Severe malnutrition (CMS/HCC) [E43]  Yes   • Constipation [K59.00]  Yes   •  Anemia [D64.9]  Unknown   • Vitamin B12 deficiency [E53.8]  Yes   • Bipolar I disorder, most recent episode depressed (CMS/HCC) [F31.30]  Yes   • Generalized anxiety disorder [F41.1]  Yes      Resolved Hospital Problems   No resolved problems to display.       MEDICAL DECISION MAKING COMPLEXITY BY PROBLEM:   Severe constipation  ; Likely multifactorial, due to opiate and poor oral intake and possible dehydration  ; On bowel preparation for colonoscopy- resolved    Multiple electrolyte imbalance  ; Replace potassium and phos- still low 1.2 on po phos- will give k phos iv once, 20mmol  ; normal vit D level   ; Resolved    Chronic nausea and vomiting, easy satiety  ; EGD - grade A esophagitis, flat stomach rugae with biopsy pending  ; On PPI      Chronic anemia  ; due to KENAN and ACD give hep C      Gradual weight loss and cachexia due to poor oral intake  ; There is no mass detected in CT of the chest, abdomen and pelvis  ; EGD-biopsy pending  ; Repeat colonoscopy- no gross mass, although it was incomplete study on the left colon    Chronic Suboxone use due to substance abuse in the past    Hepatitis C    : CT shows normal liver  ; Follow-up with GI for treatment as an outpatient    Severe cachexia with a moderate protein  calorie malnutrition    Bipolar disorder/a PTSD  ; On Zyprexa, Lamictal, Effexor, BuSpar      VTE Prophylaxis   Mechanical Order History:      Ordered        03/02/20 1640  Place Sequential Compression Device  Once         03/02/20 1640  Maintain Sequential Compression Device  Continuous                 Pharmalogical Order History:     None            Code Status -   Code Status and Medical Interventions:   Ordered at: 03/02/20 1640     Level Of Support Discussed With:    Patient     Code Status:    CPR     Medical Interventions (Level of Support Prior to Arrest):    Full       Discharge Planning  Tomorrow a.m.        Destination      Coordination has not been started for this encounter.      Durable  Medical Equipment      Coordination has not been started for this encounter.      Dialysis/Infusion      Coordination has not been started for this encounter.      Home Medical Care      Coordination has not been started for this encounter.      Therapy      Coordination has not been started for this encounter.      Community Resources      Coordination has not been started for this encounter.            Electronically signed by Caroline Crowley MD, 03/05/20, 6:01 PM.  [facility] Hospitalist Team

## 2020-03-05 NOTE — ANESTHESIA PREPROCEDURE EVALUATION
Anesthesia Evaluation     NPO Solid Status: > 8 hours  NPO Liquid Status: > 8 hours           Airway   Mallampati: II  TM distance: >3 FB  No difficulty expected  Dental      Pulmonary    (+) a smoker Current Smoked day of surgery, COPD,   Cardiovascular   Exercise tolerance: good (4-7 METS)        Neuro/Psych  (+) syncope, psychiatric history Depression, Anxiety and Bipolar,     GI/Hepatic/Renal/Endo    (+)   hepatitis C, liver disease,     Musculoskeletal     Abdominal    Substance History   (+) drug use     OB/GYN          Other                        Anesthesia Plan    ASA 3     MAC     intravenous induction     Anesthetic plan, all risks, benefits, and alternatives have been provided, discussed and informed consent has been obtained with: patient.

## 2020-03-05 NOTE — PLAN OF CARE
Pt to have colonoscopy today.Tolerating prep with good results. Denies chest pain. Pt with poor appetite, emaciated in appearance. Will continue to monitor.  Problem: Patient Care Overview  Goal: Plan of Care Review  Outcome: Ongoing (interventions implemented as appropriate)  Flowsheets (Taken 3/5/2020 7803)  Progress: no change  Plan of Care Reviewed With: patient  Goal: Individualization and Mutuality  Outcome: Ongoing (interventions implemented as appropriate)  Goal: Discharge Needs Assessment  Outcome: Ongoing (interventions implemented as appropriate)  Goal: Interprofessional Rounds/Family Conf  Outcome: Ongoing (interventions implemented as appropriate)     Problem: Skin Injury Risk (Adult)  Goal: Identify Related Risk Factors and Signs and Symptoms  Outcome: Ongoing (interventions implemented as appropriate)  Goal: Skin Health and Integrity  Outcome: Ongoing (interventions implemented as appropriate)     Problem: Pain, Acute (Adult)  Goal: Identify Related Risk Factors and Signs and Symptoms  Outcome: Ongoing (interventions implemented as appropriate)  Goal: Acceptable Pain Control/Comfort Level  Outcome: Ongoing (interventions implemented as appropriate)     Problem: Fall Risk (Adult)  Goal: Identify Related Risk Factors and Signs and Symptoms  Outcome: Ongoing (interventions implemented as appropriate)  Goal: Absence of Fall  Outcome: Ongoing (interventions implemented as appropriate)

## 2020-03-05 NOTE — NURSING NOTE
Suboxone 8-2mg, 1/2 tab ordered, 1/2tab given. Attempted to waste 1/2 tab in [employer], no waste button available. Pharmacy called and I was told to speak with my charge nurse. Med wasted with   Judith Fletcher RN in med room.

## 2020-03-05 NOTE — NURSING NOTE
Pt up to commode for bowel movement. Dark brown liquid stool noted. Unable to see bottom of commode. Dr. Lovell notified. Tap water enema x 1 ordered. Pt moved from room 6 to room 12 for privacy.

## 2020-03-05 NOTE — PLAN OF CARE
Problem: Patient Care Overview  Goal: Plan of Care Review  Outcome: Ongoing (interventions implemented as appropriate)  Note:   Patient had an EGD yesterday and a Colonoscopy today. Patient to be kept overnight per GI to monitor. No complaints or concerns have been noted.

## 2020-03-05 NOTE — OP NOTE
COLONOSCOPY Procedure Report    Patient Name:  Elizabeth Gómez  YOB: 1975    Date of Surgery:  3/5/2020     Pre-Op Diagnosis:  Constipation, unspecified constipation type [K59.00]       Post-Op Diagnosis Codes:     * Constipation, unspecified constipation type [K59.00]      Procedure/CPT® Codes:      Procedure(s):  COLONOSCOPY    Staff:  Surgeon(s):  Parker Lovlel MD         Anesthesia: Monitored Anesthesia Care    Implants:    Nothing was implanted during the procedure    Specimen:        See below    Complications:  Poor prep    Description of Procedure:  Informed consent was obtained for the procedure, including sedation.  Risks of perforation, hemorrhage, adverse drug reaction and aspiration were discussed.  The patient was brought into the endoscopy suite. Continuous cardiopulmonary monitoring was performed.  The patient was placed in the left lateral decubitus position. After adequate sedation was attained, the digital rectal exam was performed which was normal.  Subsequently, the Olympus colonoscope was inserted into the patient's rectum and advanced to the level of the cecum with difficulty.  The bowel prep was very poor despite 3.5L enemas given in pre-op and a 2 day bowel purge.  Circumferential examination of the patient's colon was performed on scope withdrawal.  A retroflex exam was performed in the rectum which showed liquid stool.  The bowel was decompressed, the scope was withdrawn from the patient, and the patient tolerated the procedure well. There were no immediate post-operative complications.     Findings:    Normal cecum  No mass in ascending or transverse colon  Incomplete visualization of the left colon due to poor prep but no large mass lesions noted    Impression:  As above    Recommendations:  OK for diet  Repeat CBC in am  Anticipate poss d/c home in am  Consider tapering dose of Suboxone as outpt  F/u in office for HCV Rx.           Parker Lovell MD      Date: 3/5/2020  Time: 3:14 PM

## 2020-03-05 NOTE — NURSING NOTE
Suboxone 8-12mg, 3 1/2 tablets given. No waste button in [employer]. Pharmacy notified. Wasted with Jordana GASTELUM RN. In med room.

## 2020-03-05 NOTE — NURSING NOTE
Patient family member got RN due to concern that patients face was drooping. RN checked patient and patient was able to smile equal and speech was clear. Tongue was not deviating, and equal hand . Informed patient and family to get nurse as soon as possible if patient experiences anymore symptoms. Will continue to monitor patient.

## 2020-03-06 ENCOUNTER — INPATIENT HOSPITAL (AMBULATORY)
Dept: URBAN - METROPOLITAN AREA HOSPITAL 84 | Facility: HOSPITAL | Age: 45
End: 2020-03-06
Payer: COMMERCIAL

## 2020-03-06 VITALS
DIASTOLIC BLOOD PRESSURE: 48 MMHG | WEIGHT: 97.44 LBS | HEART RATE: 98 BPM | OXYGEN SATURATION: 97 % | SYSTOLIC BLOOD PRESSURE: 89 MMHG | BODY MASS INDEX: 16.64 KG/M2 | HEIGHT: 64 IN | TEMPERATURE: 99.1 F | RESPIRATION RATE: 18 BRPM

## 2020-03-06 DIAGNOSIS — J44.9 CHRONIC OBSTRUCTIVE PULMONARY DISEASE, UNSPECIFIED: ICD-10-CM

## 2020-03-06 DIAGNOSIS — R63.4 ABNORMAL WEIGHT LOSS: ICD-10-CM

## 2020-03-06 DIAGNOSIS — E87.6 HYPOKALEMIA: ICD-10-CM

## 2020-03-06 DIAGNOSIS — D64.89 OTHER SPECIFIED ANEMIAS: ICD-10-CM

## 2020-03-06 DIAGNOSIS — K20.9 ESOPHAGITIS, UNSPECIFIED: ICD-10-CM

## 2020-03-06 DIAGNOSIS — K29.70 GASTRITIS, UNSPECIFIED, WITHOUT BLEEDING: ICD-10-CM

## 2020-03-06 DIAGNOSIS — R11.2 NAUSEA WITH VOMITING, UNSPECIFIED: ICD-10-CM

## 2020-03-06 DIAGNOSIS — K59.00 CONSTIPATION, UNSPECIFIED: ICD-10-CM

## 2020-03-06 DIAGNOSIS — D64.9 ANEMIA, UNSPECIFIED: ICD-10-CM

## 2020-03-06 DIAGNOSIS — R10.9 UNSPECIFIED ABDOMINAL PAIN: ICD-10-CM

## 2020-03-06 DIAGNOSIS — R07.9 CHEST PAIN, UNSPECIFIED: ICD-10-CM

## 2020-03-06 DIAGNOSIS — Z53.8 PROCEDURE AND TREATMENT NOT CARRIED OUT FOR OTHER REASONS: ICD-10-CM

## 2020-03-06 LAB
ALBUMIN SERPL-MCNC: 2.9 G/DL (ref 3.5–5.2)
ALBUMIN/GLOB SERPL: 1.3 G/DL
ALP SERPL-CCNC: 94 U/L (ref 39–117)
ALT SERPL W P-5'-P-CCNC: 24 U/L (ref 1–33)
ANION GAP SERPL CALCULATED.3IONS-SCNC: 9 MMOL/L (ref 5–15)
ARTERIAL PATENCY WRIST A: POSITIVE
AST SERPL-CCNC: 16 U/L (ref 1–32)
ATMOSPHERIC PRESS: ABNORMAL MM[HG]
BASE EXCESS BLDA CALC-SCNC: -11.9 MMOL/L (ref 0–3)
BASOPHILS # BLD AUTO: 0 10*3/MM3 (ref 0–0.2)
BASOPHILS NFR BLD AUTO: 0.3 % (ref 0–1.5)
BDY SITE: ABNORMAL
BILIRUB SERPL-MCNC: 0.2 MG/DL (ref 0.2–1.2)
BUN BLD-MCNC: 6 MG/DL (ref 6–20)
BUN/CREAT SERPL: 5.7 (ref 7–25)
CALCIUM SPEC-SCNC: 8.2 MG/DL (ref 8.6–10.5)
CHLORIDE SERPL-SCNC: 120 MMOL/L (ref 98–107)
CO2 BLDA-SCNC: 14.2 MMOL/L (ref 22–29)
CO2 SERPL-SCNC: 12 MMOL/L (ref 22–29)
CREAT BLD-MCNC: 1.06 MG/DL (ref 0.57–1)
DEPRECATED RDW RBC AUTO: 51.6 FL (ref 37–54)
EOSINOPHIL # BLD AUTO: 0.1 10*3/MM3 (ref 0–0.4)
EOSINOPHIL NFR BLD AUTO: 0.6 % (ref 0.3–6.2)
ERYTHROCYTE [DISTWIDTH] IN BLOOD BY AUTOMATED COUNT: 16.7 % (ref 12.3–15.4)
GFR SERPL CREATININE-BSD FRML MDRD: 57 ML/MIN/1.73
GLOBULIN UR ELPH-MCNC: 2.2 GM/DL
GLUCOSE BLD-MCNC: 110 MG/DL (ref 65–99)
HCO3 BLDA-SCNC: 13.4 MMOL/L (ref 21–28)
HCT VFR BLD AUTO: 27.1 % (ref 34–46.6)
HEMODILUTION: NO
HGB BLD-MCNC: 8.6 G/DL (ref 12–15.9)
LYMPHOCYTES # BLD AUTO: 3.1 10*3/MM3 (ref 0.7–3.1)
LYMPHOCYTES NFR BLD AUTO: 32.4 % (ref 19.6–45.3)
MCH RBC QN AUTO: 27.8 PG (ref 26.6–33)
MCHC RBC AUTO-ENTMCNC: 31.7 G/DL (ref 31.5–35.7)
MCV RBC AUTO: 87.8 FL (ref 79–97)
MODALITY: ABNORMAL
MONOCYTES # BLD AUTO: 0.7 10*3/MM3 (ref 0.1–0.9)
MONOCYTES NFR BLD AUTO: 7.3 % (ref 5–12)
NEUTROPHILS # BLD AUTO: 5.6 10*3/MM3 (ref 1.7–7)
NEUTROPHILS NFR BLD AUTO: 59.4 % (ref 42.7–76)
NRBC BLD AUTO-RTO: 0.1 /100 WBC (ref 0–0.2)
PCO2 BLDA: 27.3 MM HG (ref 35–48)
PH BLDA: 7.3 PH UNITS (ref 7.35–7.45)
PLATELET # BLD AUTO: 201 10*3/MM3 (ref 140–450)
PMV BLD AUTO: 8.8 FL (ref 6–12)
PO2 BLDA: 104.8 MM HG (ref 83–108)
POTASSIUM BLD-SCNC: 3.2 MMOL/L (ref 3.5–5.2)
PROT SERPL-MCNC: 5.1 G/DL (ref 6–8.5)
RBC # BLD AUTO: 3.08 10*6/MM3 (ref 3.77–5.28)
SAO2 % BLDCOA: 97.5 % (ref 94–98)
SODIUM BLD-SCNC: 141 MMOL/L (ref 136–145)
WBC NRBC COR # BLD: 9.5 10*3/MM3 (ref 3.4–10.8)

## 2020-03-06 PROCEDURE — 82803 BLOOD GASES ANY COMBINATION: CPT

## 2020-03-06 PROCEDURE — 99406 BEHAV CHNG SMOKING 3-10 MIN: CPT

## 2020-03-06 PROCEDURE — 80053 COMPREHEN METABOLIC PANEL: CPT | Performed by: INTERNAL MEDICINE

## 2020-03-06 PROCEDURE — 36600 WITHDRAWAL OF ARTERIAL BLOOD: CPT

## 2020-03-06 PROCEDURE — 94799 UNLISTED PULMONARY SVC/PX: CPT

## 2020-03-06 PROCEDURE — 85025 COMPLETE CBC W/AUTO DIFF WBC: CPT | Performed by: INTERNAL MEDICINE

## 2020-03-06 PROCEDURE — 99239 HOSP IP/OBS DSCHRG MGMT >30: CPT | Performed by: INTERNAL MEDICINE

## 2020-03-06 PROCEDURE — 99231 SBSQ HOSP IP/OBS SF/LOW 25: CPT | Performed by: NURSE PRACTITIONER

## 2020-03-06 RX ORDER — BISACODYL 5 MG/1
10 TABLET, DELAYED RELEASE ORAL DAILY PRN
Qty: 15 TABLET | Refills: 0 | Status: SHIPPED | OUTPATIENT
Start: 2020-03-06 | End: 2021-02-04

## 2020-03-06 RX ORDER — POTASSIUM CHLORIDE 20 MEQ/1
40 TABLET, EXTENDED RELEASE ORAL ONCE
Status: DISCONTINUED | OUTPATIENT
Start: 2020-03-06 | End: 2020-03-06 | Stop reason: SDUPTHER

## 2020-03-06 RX ORDER — SODIUM BICARBONATE 650 MG/1
1300 TABLET ORAL 3 TIMES DAILY
Qty: 18 TABLET | Refills: 0 | Status: SHIPPED | OUTPATIENT
Start: 2020-03-06 | End: 2020-03-09

## 2020-03-06 RX ORDER — POLYETHYLENE GLYCOL 3350 17 G/17G
17 POWDER, FOR SOLUTION ORAL DAILY
Status: DISCONTINUED | OUTPATIENT
Start: 2020-03-06 | End: 2020-03-06 | Stop reason: SDUPTHER

## 2020-03-06 RX ORDER — SODIUM BICARBONATE 650 MG/1
1300 TABLET ORAL 3 TIMES DAILY
Status: DISCONTINUED | OUTPATIENT
Start: 2020-03-06 | End: 2020-03-06 | Stop reason: HOSPADM

## 2020-03-06 RX ADMIN — SODIUM BICARBONATE 150 MEQ: 84 INJECTION, SOLUTION INTRAVENOUS at 09:53

## 2020-03-06 RX ADMIN — IPRATROPIUM BROMIDE AND ALBUTEROL SULFATE 3 ML: .5; 3 SOLUTION RESPIRATORY (INHALATION) at 11:42

## 2020-03-06 RX ADMIN — POTASSIUM CHLORIDE 40 MEQ: 1500 TABLET, EXTENDED RELEASE ORAL at 12:12

## 2020-03-06 RX ADMIN — BUPRENORPHINE AND NALOXONE 0.5 TABLET: 8; 2 TABLET SUBLINGUAL at 12:12

## 2020-03-06 RX ADMIN — VENLAFAXINE HYDROCHLORIDE 150 MG: 75 CAPSULE, EXTENDED RELEASE ORAL at 08:29

## 2020-03-06 RX ADMIN — IPRATROPIUM BROMIDE AND ALBUTEROL SULFATE 3 ML: .5; 3 SOLUTION RESPIRATORY (INHALATION) at 02:44

## 2020-03-06 RX ADMIN — BUSPIRONE HYDROCHLORIDE 30 MG: 15 TABLET ORAL at 08:28

## 2020-03-06 RX ADMIN — PANTOPRAZOLE SODIUM 40 MG: 40 TABLET, DELAYED RELEASE ORAL at 05:34

## 2020-03-06 RX ADMIN — BUPRENORPHINE AND NALOXONE 0.5 TABLET: 8; 2 TABLET SUBLINGUAL at 08:28

## 2020-03-06 RX ADMIN — IPRATROPIUM BROMIDE AND ALBUTEROL SULFATE 3 ML: .5; 3 SOLUTION RESPIRATORY (INHALATION) at 06:32

## 2020-03-06 RX ADMIN — DOCUSATE SODIUM 100 MG: 100 CAPSULE, LIQUID FILLED ORAL at 08:29

## 2020-03-06 RX ADMIN — POTASSIUM CHLORIDE 40 MEQ: 1500 TABLET, EXTENDED RELEASE ORAL at 08:29

## 2020-03-06 RX ADMIN — FLUTICASONE PROPIONATE 2 SPRAY: 50 SPRAY, METERED NASAL at 08:29

## 2020-03-06 RX ADMIN — Medication 10 ML: at 08:30

## 2020-03-06 NOTE — NURSING NOTE
Suboxone 8-12mg, (3) 0.5 tablets given. No waste button in [employer]. Pharmacy notified. Wasted with Saroj JOEL in med room.

## 2020-03-06 NOTE — PAYOR COMM NOTE
"Updated clinical review for pending inpt auth request.        RETURN CONTACT  ALICE WILKES RN   [facility]   U.R. /    PH: [phone]  FX: [phone]    Elizabeth Glaser (44 y.o. Female)     Date of Birth Social Security Number Address Home Phone MRN    1975  [id number] [place] [address] [phone] [id number]    Jainism Marital Status          None Single       Admission Date Admission Type Admitting Provider Attending Provider Department, Room/Bed    3/2/20 Emergency Caroline Tidwell MD Seo, Mi La, MD [facility] 2B MEDICAL INPATIENT, 232/1    Discharge Date Discharge Disposition Discharge Destination                       Attending Provider:  Caroline Tidwell MD    Allergies:  Erythromycin Base, [place]    Isolation:  None   Infection:  None   Code Status:  CPR    Ht:  162.6 cm (64\")   Wt:  44.2 kg (97 lb 7.1 oz)    Admission Cmt:  None   Principal Problem:  Chest pain [R07.9]                 Active Insurance as of 3/2/2020     Primary Coverage     Payor Plan Insurance Group Employer/Plan Group    [place] [facility] [facility] [place]AN      Payor Plan Address Payor Plan Phone Number Payor Plan Fax Number Effective Dates    [address] [phone]  1/1/2019 - None Entered    [place] [address]       Subscriber Name Subscriber Birth Date Member ID       ELIZABETH GLASER 1975 [id number]                 Emergency Contacts      (Rel.) Home Phone Work Phone Mobile Phone    CONSUELO DAVILA (Mother) [phone] -- [phone]               Physician Progress Notes (last 24 hours) (Notes from 03/05/20 1023 through 03/06/20 1023)      Caroline Tidwell MD at 03/05/20 1801                [facility] Medicine Services Daily Progress Note      Hospitalist Team  LOS 3 days      Patient Care Team:  Blanca Welch APRN as PCP - General (Nurse Practitioner)    Patient Location: 232/1      Subjective   Subjective     Chief " "Complaint / Subjective  Chief Complaint   Patient presents with   • Weakness - Generalized         Brief Synopsis of Hospital Course/HPI   44-year-old female PMH of remote history of substance abuse, currently clean and on Suboxone for 2 years, constipation tendency, hepatitis C, gradual weight loss  , Presenting to ED with a complaint of severe constipation for 3 weeks associated with abdominal pain and discomfort.  Was tried on several laxatives at home and one-time enema without effect.  Never had this worst  constipation in the past.  On admission potassium was 2.4.  He attributes gradual weight loss to poor oral intake due to early satiety and gastric fullness A/W nausea and vomiting.          Date::    3/3; seen by GI . Planned for EGD and colonoscopy. Bowel preparation today.   CT chest, abdomen and pelvis were unremarkable    3/4; EGD Grade A esophagitis, flattened stomach rugae, incomplete colonoscopy, need to repeat tomorrow, biopsy done    3/5; repeat colonoscopy today did not show any gross mass.    ROS   had bowel prep, so moved bowel a lot. The rest of systems were unremarkable      Objective   Objective      Vital Signs  Temp:  [97.7 °F (36.5 °C)-98.6 °F (37 °C)] 98.6 °F (37 °C)  Heart Rate:  [] 84  Resp:  [12-16] 14  BP: ()/(28-66) 97/58  Oxygen Therapy  SpO2: 100 %  Pulse Oximetry Type: Intermittent  Device (Oxygen Therapy): room air  Device (Oxygen Therapy): room air  Flow (L/min): 3  Oximetry Probe Site Changed: No  Flowsheet Rows      First Filed Value   Admission Height  162.6 cm (64\") Documented at 03/02/2020 1312   Admission Weight  43.1 kg (95 lb 0.3 oz) Documented at 03/02/2020 1312        Intake & Output (last 3 days)       03/02 0701 - 03/03 0700 03/03 0701 - 03/04 0700 03/04 0701 - 03/05 0700 03/05 0701 - 03/06 0700    P.O.  720 600 480    I.V. (mL/kg)    200 (4.6)    IV Piggyback 500       Total Intake(mL/kg) 500 (11.2) 720 (16.7) 600 (13.9) 680 (15.8)    Urine (mL/kg/hr)  " 800 (0.8) 1600 (1.5)     Stool  0 0     Total Output  800 1600     Net +500 -80 -1000 +680            Urine Unmeasured Occurrence  3 x      Stool Unmeasured Occurrence  5 x 1 x         Lines, Drains & Airways    Active LDAs     Name:   Placement date:   Placement time:   Site:   Days:    Peripheral IV 03/02/20 1425 Left Arm   03/02/20    1425    Arm   1                  Physical Exam:    Physical Exam    Constitutional: She is oriented to person, place, and time. She appears well-developed and well-nourished. No distress.   HENT:   Head: Normocephalic and atraumatic.   Nose: Nose normal.   Eyes: Pupils are equal, round, and reactive to light. Conjunctivae and EOM are normal.   Neck: Normal range of motion.   Cardiovascular: Normal rate, regular rhythm, normal heart sounds and intact distal pulses.   Pulmonary/Chest: Effort normal and breath sounds normal. No stridor. No respiratory distress.   Abdominal: Soft. Bowel sounds are normal. Not tender or distended. There is no guarding.   Musculoskeletal: Normal range of motion. She exhibits no edema, tenderness or deformity.   Neurological: She is alert and oriented to person, place, and time. No cranial nerve deficit.   Skin: Skin is warm and dry. No rash noted. She is not diaphoretic. No erythema.   Psychiatric: She has a normal mood and affect. Her behavior is normal.   Nursing note and vitals reviewed.      Procedures:    Procedure(s):  COLONOSCOPY  ESOPHAGOGASTRODUODENOSCOPY          Results Review:     I reviewed the patient's new clinical results.      Lab Results (last 24 hours)     Procedure Component Value Units Date/Time    Tissue Pathology Exam [[id number]] Collected:  03/04/20 1302    Specimen:  Tissue from Gastric, Body; Tissue from Small Intestine Updated:  03/05/20 1538     Case Report --     Surgical Pathology Report                         Case: [id number]                                  Authorizing Provider:  Parker Lovell MD   Collected:        "    03/04/2020 01:02 PM          Ordering Location:     [facility]  Received:            03/04/2020 02:44 PM                                 SUITES                                                                       Pathologist:           Bryant Marmolejo MD                                                             Specimens:   1) - Gastric, Body, biopsy X1                                                                       2) - Small Intestine, R/O celiac                                                            Final Diagnosis --     Specimen #1 (Stomach, biopsy):    Chronic gastritis    H. pylori immunostain negative for Helicobacter organisms (control reacts appropriately)     Specimen #2 (Duodenum, biopsy):    Benign duodenal mucosa with preserved villous architecture and no significant histopathology     Negative for celiac disease    JPR/sms        Gross Description --     Two specimens are received.    Specimen #1 is labeled \"Gastric body\". Received in formalin are three fragments of pink tan soft tissue, the largest which measures 0.3 cm. The specimen is filtered and submitted in its entirety in screen cassette 1.    Specimen #2 is labeled \"Small intestine\". Received in formalin are two fragments of pink tan soft tissue, the largest which measures 0.4 cm. The specimen is filtered and submitted in its entirety in screen cassette 2.      JPR/cec      Phosphorus [[id number]]  (Normal) Collected:  03/05/20 0500    Specimen:  Blood Updated:  03/05/20 0542     Phosphorus 2.6 mg/dL     Potassium [[id number]]  (Abnormal) Collected:  03/05/20 0500    Specimen:  Blood Updated:  03/05/20 0542     Potassium 3.1 mmol/L         Hemoglobin A1C   Date Value Ref Range Status   03/03/2020 4.9 3.5 - 5.6 % Final               Lab Results   Component Value Date    LIPASE 190 (H) 02/24/2020     Lab Results   Component Value Date    CHOL 82 03/03/2020    TRIG 63 03/03/2020    HDL 30 (L) 03/03/2020    LDL 39 " 03/03/2020       Lab Results   Lab Value Date/Time    FINALDX  03/04/2020 1302     Specimen #1 (Stomach, biopsy):    Chronic gastritis    H. pylori immunostain negative for Helicobacter organisms (control reacts appropriately)     Specimen #2 (Duodenum, biopsy):    Benign duodenal mucosa with preserved villous architecture and no significant histopathology     Negative for celiac disease    JPR/sms          Microbiology Results (last 10 days)     ** No results found for the last 240 hours. **          ECG/EMG Results (most recent)     Procedure Component Value Units Date/Time    ECG 12 Lead [[id number]] Resulted:  03/02/20 1536     Updated:  03/02/20 1536    ECG 12 Lead [[id number]] Collected:  03/02/20 1317     Updated:  03/02/20 1536    Narrative:       HEART RATE= 94  bpm  RR Interval= 636  ms  IA Interval= 150  ms  P Horizontal Axis= 12  deg  P Front Axis= 79  deg  QRSD Interval= 99  ms  QT Interval= 405  ms  QRS Axis= 43  deg  T Wave Axis= 239  deg  - ABNORMAL ECG -  Sinus rhythm  Repol abnrm suggests ischemia, diffuse leads  When compared with ECG of 24-Feb-2020 13:13:51,  Significant repolarization change  Electronically Signed By: Reno Cortez (Huber) 02-Mar-2020 15:36:39  Date and Time of Study: 2020-03-02 13:17:19          Results for orders placed during the hospital encounter of 08/23/19   Duplex Carotid Ultrasound CAR    Narrative · Proximal right internal carotid artery mild stenosis.  · Proximal left internal carotid artery mild stenosis.          Results for orders placed during the hospital encounter of 08/23/19   Adult Transthoracic Echo Complete W/ Cont if Necessary Per Protocol    Narrative · Estimated EF = 60%.  · Left ventricular systolic function is normal.       Indications  Syncope    Technically satisfactory study.  Mitral valve is structurally normal.  Tricuspid valve is structurally normal.  Aortic valve is structurally normal.  Pulmonic valve could not be well visualized.  No evidence for  mitral tricuspid or aortic regurgitation is seen by   Doppler study.  Left atrium is normal in size.  Right atrium is normal in size.  Left ventricle is normal in size and contractility with ejection fraction   of 60%.  Right ventricle is normal in size.  Atrial septum is intact.  Aorta is normal.  No pericardial effusion or intracardiac thrombus is seen.    Impression  Structurally and functionally normal cardiac valves.  Normal echo Doppler study.  Left ventricular ejection fraction is 60%.           Ct Abdomen Pelvis Without Contrast    Result Date: 3/2/2020   1.  Large amount of high density stool throughout the colon which is distended but improved from prior study as detailed above.  Findings would be compatible with patient's history of constipation.  No evidence of bowel obstruction. 2.  Small areas of groundglass airspace disease in both lung bases which is nonspecific and may be infectious or inflammatory in etiology.  Electronically Signed By-Tony Leon On:3/2/2020 3:14 PM This report was finalized on [id number] by  Tony Leon, .    Ct Chest With Contrast    Result Date: 3/3/2020   1.  Borderline enlarged right hilar nodes measuring 1.8 x 1.2 cm.  No other enlarged mediastinal, hilar, or axillary lymph nodes. 2.  No focal airspace consolidation or pleural effusion. 3.  Stable small area of nodular pleural thickening along the undersurface of the minor fissure within the right middle lobe.  Electronically Signed By-Tony Leon On:3/3/2020 11:18 AM This report was finalized on [id number] by  Tony Leon, .    Xr Chest 1 View    Result Date: 3/2/2020  No acute chest findings.  Electronically Signed By-Dr. Heather Romo MD On:3/2/2020 2:33 PM This report was finalized on [id number] by Dr. Heather Romo MD.          Xrays, labs reviewed personally by physician.    Medication Review:   I have reviewed the patient's current medication list      Scheduled Meds    buprenorphine-naloxone 0.5  tablet Sublingual 4x Daily   busPIRone 30 mg Oral Q12H   docusate sodium 100 mg Oral BID   fluticasone 2 spray Nasal Daily   ipratropium-albuterol 3 mL Nebulization Q8H - RT   lamoTRIgine 200 mg Oral Nightly   OLANZapine 20 mg Oral Nightly   pantoprazole 40 mg Oral Q AM   polyethylene glycol 17 g Oral Daily   potassium & sodium phosphates 1 packet Oral 4x Daily AC & at Bedtime   sodium chloride 10 mL Intravenous Q12H   venlafaxine  mg Oral Daily       Meds Infusions    sodium chloride 9 mL/hr Last Rate: 9 mL/hr (03/05/20 1212)       Meds PRN  •  acetaminophen **OR** acetaminophen **OR** acetaminophen  •  aluminum-magnesium hydroxide-simethicone  •  bisacodyl  •  bisacodyl  •  Calcium Gluconate-NaCl **AND** calcium gluconate **AND** Calcium, Ionized  •  magnesium hydroxide  •  magnesium sulfate **OR** magnesium sulfate in D5W 1g/100mL (PREMIX)  •  melatonin  •  ondansetron **OR** ondansetron  •  ondansetron **OR** ondansetron  •  ondansetron **OR** ondansetron  •  potassium chloride **OR** potassium chloride **OR** potassium chloride  •  [COMPLETED] Insert peripheral IV **AND** sodium chloride  •  sodium chloride  •  sodium chloride  •  zolpidem    I personally reviewed patient's x-ray films and my findings are same    I personally reviewed patient's EKG strips and my findings are same    Assessment/Plan   Assessment/Plan     Active Hospital Problems    Diagnosis  POA   • **Chest pain [R07.9]  Yes   • Hypokalemia [E87.6]  Yes   • Abdominal pain [R10.9]  Yes   • PTSD (post-traumatic stress disorder) [F43.10]  Yes   • Hep C w/o coma, chronic (CMS/HCC) [B18.2]  Yes   • Severe malnutrition (CMS/HCC) [E43]  Yes   • Constipation [K59.00]  Yes   • Anemia [D64.9]  Unknown   • Vitamin B12 deficiency [E53.8]  Yes   • Bipolar I disorder, most recent episode depressed (CMS/HCC) [F31.30]  Yes   • Generalized anxiety disorder [F41.1]  Yes      Resolved Hospital Problems   No resolved problems to display.       MEDICAL  DECISION MAKING COMPLEXITY BY PROBLEM:   Severe constipation  ; Likely multifactorial, due to opiate and poor oral intake and possible dehydration  ; On bowel preparation for colonoscopy- resolved    Multiple electrolyte imbalance  ; Replace potassium and phos- still low 1.2 on po phos- will give k phos iv once, 20mmol  ; normal vit D level   ; Resolved    Chronic nausea and vomiting, easy satiety  ; EGD - grade A esophagitis, flat stomach rugae with biopsy pending  ; On PPI      Chronic anemia  ; due to KENAN and ACD give hep C      Gradual weight loss and cachexia due to poor oral intake  ; There is no mass detected in CT of the chest, abdomen and pelvis  ; EGD-biopsy pending  ; Repeat colonoscopy- no gross mass, although it was incomplete study on the left colon    Chronic Suboxone use due to substance abuse in the past    Hepatitis C    : CT shows normal liver  ; Follow-up with GI for treatment as an outpatient    Severe cachexia with a moderate protein  calorie malnutrition    Bipolar disorder/a PTSD  ; On Zyprexa, Lamictal, Effexor, BuSpar      VTE Prophylaxis   Mechanical Order History:      Ordered        03/02/20 1640  Place Sequential Compression Device  Once         03/02/20 1640  Maintain Sequential Compression Device  Continuous                 Pharmalogical Order History:     None            Code Status -   Code Status and Medical Interventions:   Ordered at: 03/02/20 1640     Level Of Support Discussed With:    Patient     Code Status:    CPR     Medical Interventions (Level of Support Prior to Arrest):    Full       Discharge Planning  Tomorrow a.m.        Destination      Coordination has not been started for this encounter.      Durable Medical Equipment      Coordination has not been started for this encounter.      Dialysis/Infusion      Coordination has not been started for this encounter.      Home Medical Care      Coordination has not been started for this encounter.      Therapy       Coordination has not been started for this encounter.      Community Resources      Coordination has not been started for this encounter.            Electronically signed by Caroline Crowley MD, 03/05/20, 6:01 PM.  [facility]        Electronically signed by Caroline Tidwell MD at 03/05/20 1805       Consult Notes (last 24 hours) (Notes from 03/05/20 1023 through 03/06/20 1023)    No notes of this type exist for this encounter.

## 2020-03-06 NOTE — PROGRESS NOTES
LOS: 4 days   Patient Care Team:  Blanca Welch APRN as PCP - General (Nurse Practitioner)      Subjective     Interval History:     Subjective: Patient reports that she is feeling much better today.  Continues to have some mild abdominal cramping.  Has been tolerating a diet and has been eating at least 50% of her tray.      Review of Systems   Constitutional: Negative for chills and fever.   HENT: Negative for sore throat and trouble swallowing.    Respiratory: Negative for cough and shortness of breath.    Cardiovascular: Negative for chest pain and leg swelling.   Gastrointestinal: Positive for abdominal pain. Negative for abdominal distention, anal bleeding, blood in stool, constipation, diarrhea, nausea, rectal pain and vomiting.   Genitourinary: Negative for dysuria and hematuria.   Musculoskeletal: Negative for back pain and joint swelling.   Skin: Negative for color change and rash.   Neurological: Negative for dizziness and light-headedness.   Psychiatric/Behavioral: Negative for confusion. The patient is not nervous/anxious.         Medication Review:     Current Facility-Administered Medications:   •  acetaminophen (TYLENOL) tablet 650 mg, 650 mg, Oral, Q4H PRN, 650 mg at 03/04/20 2338 **OR** acetaminophen (TYLENOL) 160 MG/5ML solution 650 mg, 650 mg, Oral, Q4H PRN **OR** acetaminophen (TYLENOL) suppository 650 mg, 650 mg, Rectal, Q4H PRN, Parker Lovell MD  •  aluminum-magnesium hydroxide-simethicone (MAALOX MAX) 400-400-40 MG/5ML suspension 15 mL, 15 mL, Oral, Q6H PRN, Parker Lovell MD  •  bisacodyl (DULCOLAX) EC tablet 5 mg, 5 mg, Oral, Daily PRN, Parker Lovell MD, 5 mg at 03/03/20 1118  •  bisacodyl (DULCOLAX) suppository 10 mg, 10 mg, Rectal, Daily PRN, Parker Lovell MD  •  buprenorphine-naloxone (SUBOXONE) 8-2 MG per SL tablet 0.5 tablet, 0.5 tablet, Sublingual, 4x Daily, Parker Lovell MD, 0.5 tablet at 03/06/20 1212  •  busPIRone (BUSPAR) tablet 30  mg, 30 mg, Oral, Q12H, Parker Lovell MD, 30 mg at 03/06/20 0828  •  calcium gluconate 1g/50ml 0.675% NaCl IV SOLN, 1 g, Intravenous, PRN **AND** calcium gluconate 2-0.675 GM/100ML NACL IVPB, 2 g, Intravenous, PRN **AND** Calcium, Ionized, , , PRN, Parker Lovell MD  •  docusate sodium (COLACE) capsule 100 mg, 100 mg, Oral, BID, Parker Lovell MD, 100 mg at 03/06/20 0829  •  fluticasone (FLONASE) 50 MCG/ACT nasal spray 2 spray, 2 spray, Nasal, Daily, Parker Lovell MD, 2 spray at 03/06/20 0829  •  ipratropium-albuterol (DUO-NEB) nebulizer solution 3 mL, 3 mL, Nebulization, Q8H - RT, Parker Lovell MD, 3 mL at 03/06/20 1142  •  lamoTRIgine (LaMICtal) tablet 200 mg, 200 mg, Oral, Nightly, Parker Lovell MD, 200 mg at 03/05/20 2052  •  magnesium hydroxide (MILK OF MAGNESIA) suspension 2400 mg/10mL 10 mL, 10 mL, Oral, Daily PRN, Parker Lovell MD  •  Magnesium Sulfate 2 gram infusion - Mg less than or equal to 1.5 mg/dL, 2 g, Intravenous, PRN **OR** Magnesium Sulfate 1 gram infusion - Mg 1.6-1.9 mg/dL, 1 g, Intravenous, PRN, Parker Lovell MD  •  melatonin tablet 5 mg, 5 mg, Oral, Nightly PRN, Parker Lovell MD  •  OLANZapine (zyPREXA) tablet 20 mg, 20 mg, Oral, Nightly, Parker Lovell MD, 20 mg at 03/05/20 2052  •  ondansetron (ZOFRAN) tablet 4 mg, 4 mg, Oral, Q6H PRN, 4 mg at 03/03/20 1554 **OR** ondansetron (ZOFRAN) injection 4 mg, 4 mg, Intravenous, Q6H PRN, Parker Lovell MD, 4 mg at 03/03/20 0814  •  ondansetron (ZOFRAN) tablet 4 mg, 4 mg, Oral, Q6H PRN **OR** ondansetron (ZOFRAN) injection 4 mg, 4 mg, Intravenous, Q6H PRN, Misha Honeycutt MD  •  ondansetron (ZOFRAN) tablet 4 mg, 4 mg, Oral, Q6H PRN **OR** ondansetron (ZOFRAN) injection 4 mg, 4 mg, Intravenous, Q6H PRN, Parker Lovell MD  •  pantoprazole (PROTONIX) EC tablet 40 mg, 40 mg, Oral, Q AM, Parker Lovell MD, 40 mg at 03/06/20 0580  •  polyethylene glycol 3350  powder (packet), 17 g, Oral, Daily, Parker Lovell MD, 17 g at 03/03/20 1033  •  potassium chloride (K-DUR,KLOR-CON) CR tablet 40 mEq, 40 mEq, Oral, PRN, 40 mEq at 03/06/20 1212 **OR** potassium chloride (KLOR-CON) packet 40 mEq, 40 mEq, Oral, PRN **OR** potassium chloride 10 mEq in 100 mL IVPB, 10 mEq, Intravenous, Q1H PRN, Parker Lovell MD, Last Rate: 100 mL/hr at 03/05/20 0918, 10 mEq at 03/05/20 0918  •  sodium bicarbonate tablet 1,300 mg, 1,300 mg, Oral, TID, Caroline Tidwell MD  •  [COMPLETED] Insert peripheral IV, , , Once **AND** sodium chloride 0.9 % flush 10 mL, 10 mL, Intravenous, PRN, Parker Lovell MD  •  sodium chloride 0.9 % flush 10 mL, 10 mL, Intravenous, Q12H, Parker Lovell MD, 10 mL at 03/06/20 0830  •  sodium chloride 0.9 % flush 10 mL, 10 mL, Intravenous, PRN, Parker Lovell MD  •  sodium chloride 0.9 % infusion, 9 mL/hr, Intravenous, Continuous PRN, Parker Lovell MD, Last Rate: 9 mL/hr at 03/05/20 1212  •  venlafaxine XR (EFFEXOR-XR) 24 hr capsule 150 mg, 150 mg, Oral, Daily, Parker Lovell MD, 150 mg at 03/06/20 0829  •  zolpidem (AMBIEN) tablet 5 mg, 5 mg, Oral, Nightly PRN, Parker Lovell MD, 5 mg at 03/05/20 2052      Objective     Vital Signs  Temp:  [98.4 °F (36.9 °C)-99.1 °F (37.3 °C)] 99.1 °F (37.3 °C)  Heart Rate:  [] 98  Resp:  [12-18] 18  BP: ()/(28-66) 89/48  Physical Exam:    General Appearance:    Awake and alert, in no acute distress   Head:    Normocephalic, without obvious abnormality   Eyes:          Conjunctivae normal, anicteric sclera   Ears:    Hearing intact   Throat:   No oral lesions, no thrush, oral mucosa moist   Neck:   No adenopathy, supple, no JVD   Lungs:     Clear to auscultation bilaterally, respirations regular, even and unlabored    Heart:    Regular rhythm and normal rate, normal S1 and S2   Abdomen:     Normal bowel sounds, soft, mild lower abdominal tenderness, no rebound or guarding,  non-distended   Rectal:     Deferred   Extremities:   No edema, no cyanosis, no redness   Skin:   No bleeding, bruising or rash, no jaundice   Neurologic:   Cranial nerves 2 - 12 grossly intact, no asterixis, sensation   intact        Results Review:    Lab Results (last 24 hours)     Procedure Component Value Units Date/Time    Blood Gas, Arterial [[id number]]  (Abnormal) Collected:  03/06/20 0926    Specimen:  Arterial Blood Updated:  03/06/20 0930     Site Right Radial     Royal's Test Positive     pH, Arterial 7.299 pH units      pCO2, Arterial 27.3 mm Hg      pO2, Arterial 104.8 mm Hg      HCO3, Arterial 13.4 mmol/L      Base Excess, Arterial -11.9 mmol/L      Comment: Serial Number: [id number]:  [id number]        O2 Saturation, Arterial 97.5 %      CO2 Content 14.2 mmol/L      Barometric Pressure for Blood Gas --     Comment: N/A        Modality Room Air     Hemodilution No    Comprehensive Metabolic Panel [[id number]]  (Abnormal) Collected:  03/06/20 0543    Specimen:  Blood Updated:  03/06/20 0719     Glucose 110 mg/dL      BUN 6 mg/dL      Creatinine 1.06 mg/dL      Sodium 141 mmol/L      Potassium 3.2 mmol/L      Chloride 120 mmol/L      CO2 12.0 mmol/L      Calcium 8.2 mg/dL      Total Protein 5.1 g/dL      Albumin 2.90 g/dL      ALT (SGPT) 24 U/L      AST (SGOT) 16 U/L      Alkaline Phosphatase 94 U/L      Total Bilirubin 0.2 mg/dL      eGFR Non African Amer 57 mL/min/1.73      Globulin 2.2 gm/dL      A/G Ratio 1.3 g/dL      BUN/Creatinine Ratio 5.7     Anion Gap 9.0 mmol/L     Narrative:       GFR Normal >60  Chronic Kidney Disease <60  Kidney Failure <15      CBC & Differential [[id number]] Collected:  03/06/20 0543    Specimen:  Blood Updated:  03/06/20 0640    Narrative:       The following orders were created for panel order CBC & Differential.  Procedure                               Abnormality         Status                     ---------                               -----------         ------                      CBC Auto Differential[[id number]]        Abnormal            Final result                 Please view results for these tests on the individual orders.    CBC Auto Differential [[id number]]  (Abnormal) Collected:  03/06/20 0543    Specimen:  Blood Updated:  03/06/20 0640     WBC 9.50 10*3/mm3      RBC 3.08 10*6/mm3      Hemoglobin 8.6 g/dL      Hematocrit 27.1 %      MCV 87.8 fL      MCH 27.8 pg      MCHC 31.7 g/dL      RDW 16.7 %      RDW-SD 51.6 fl      MPV 8.8 fL      Platelets 201 10*3/mm3      Neutrophil % 59.4 %      Lymphocyte % 32.4 %      Monocyte % 7.3 %      Eosinophil % 0.6 %      Basophil % 0.3 %      Neutrophils, Absolute 5.60 10*3/mm3      Lymphocytes, Absolute 3.10 10*3/mm3      Monocytes, Absolute 0.70 10*3/mm3      Eosinophils, Absolute 0.10 10*3/mm3      Basophils, Absolute 0.00 10*3/mm3      nRBC 0.1 /100 WBC     Tissue Pathology Exam [[id number]] Collected:  03/04/20 1302    Specimen:  Tissue from Gastric, Body; Tissue from Small Intestine Updated:  03/05/20 1538     Case Report --     Surgical Pathology Report                         Case: [id number]                                  Authorizing Provider:  Parker Lovell MD   Collected:           03/04/2020 01:02 PM          Ordering Location:     [facility]  Received:            03/04/2020 02:44 PM                                 SUITES                                                                       Pathologist:           Bryant Marmolejo MD                                                             Specimens:   1) - Gastric, Body, biopsy X1                                                                       2) - Small Intestine, R/O celiac                                                            Final Diagnosis --     Specimen #1 (Stomach, biopsy):    Chronic gastritis    H. pylori immunostain negative for Helicobacter organisms (control reacts appropriately)     Specimen #2 (Duodenum, biopsy):    Benign  "duodenal mucosa with preserved villous architecture and no significant histopathology     Negative for celiac disease    JPR/sms        Gross Description --     Two specimens are received.    Specimen #1 is labeled \"Gastric body\". Received in formalin are three fragments of pink tan soft tissue, the largest which measures 0.3 cm. The specimen is filtered and submitted in its entirety in screen cassette 1.    Specimen #2 is labeled \"Small intestine\". Received in formalin are two fragments of pink tan soft tissue, the largest which measures 0.4 cm. The specimen is filtered and submitted in its entirety in screen cassette 2.      JPR/cec            Imaging Results (Last 24 Hours)     ** No results found for the last 24 hours. **            ASSESSMENT:  -Chest pain  -Constipation -likely secondary to chronic narcotic use  -Unintentional weight loss  -Nausea/vomiting -likely due to constipation  -Chronic hepatitis C -treatment naïve  -Hypokalemia  -Normocytic anemia  -ADHD/anxiety/depression/bipolar/PTSD  -COPD  -History of substance abuse on Suboxone -reports last drug use 4 years ago  -History of cholecystectomy      PLAN:  Patient reports that she feels much better today.  Continues to have some mild abdominal cramping.  Has been tolerating a diet and has been eating at least 50% of her trays.  Status post colonoscopy yesterday which did not show any evidence of mass.  Status post EGD 2 days ago which showed only flattened rugae of the gastric body.  Symptoms are likely worsened by opioid induced constipation.  Will recommend daily bowel regimen.  The importance of nutrition was also discussed with the patient.  Okay for discharge from GI standpoint.  We will see as needed.      MAXIMUS Osorio  03/06/20  1:39 PM        "

## 2020-03-06 NOTE — PROGRESS NOTES
Case Management Discharge Note           Provided Post Acute Provider List?: Refused                 Final Discharge Disposition Code: 01 - home or self-care

## 2020-03-06 NOTE — PROGRESS NOTES
Tobacco Treatment Specialist Note:    Pt was seen by the TTS for smoking cessation. Pt very interested in quitting smoking. Pt states she recently quit smoking for 30 days and picked up smoking again. Pt states she wore the patch during quitting and did well. Pt states she plans to use the patch again.  Also talked to pt about other NRT options and the free stop smoking class at [facility].

## 2020-03-06 NOTE — ANESTHESIA POSTPROCEDURE EVALUATION
Patient: Elizabeth Gómez    Procedure Summary     Date:  03/05/20 Room / Location:  [facility] / [facility]    Anesthesia Start:  1447 Anesthesia Stop:  1515    Procedure:  COLONOSCOPY (N/A ) Diagnosis:       Constipation, unspecified constipation type      (Constipation, unspecified constipation type [K59.00])    Surgeon:  Parker Lovell MD Provider:  Jeferson Flower MD    Anesthesia Type:  MAC ASA Status:  3          Anesthesia Type: MAC    Vitals  Vitals Value Taken Time   BP 97/58 3/5/2020  3:32 PM   Temp     Pulse 80 3/5/2020  3:36 PM   Resp 13 3/5/2020  3:32 PM   SpO2 100 % 3/5/2020  3:36 PM   Vitals shown include unvalidated device data.        Post Anesthesia Care and Evaluation    Patient location during evaluation: PACU  Patient participation: complete - patient participated  Level of consciousness: awake  Pain scale: See nurse's notes for pain score.  Pain management: adequate  Airway patency: patent  Anesthetic complications: No anesthetic complications  PONV Status: none  Cardiovascular status: acceptable  Respiratory status: acceptable  Hydration status: acceptable    Comments: Patient seen and examined postoperatively; vital signs stable; SpO2 greater than or equal to 90%; cardiopulmonary status stable; nausea/vomiting adequately controlled; pain adequately controlled; no apparent anesthesia complications; patient discharged from anesthesia care when discharge criteria were met

## 2020-03-06 NOTE — DISCHARGE SUMMARY
[facility] Medicine Services  DISCHARGE SUMMARY        Prepared For PCP:  Blanca Welch APRN    Patient Name: Elizabeth Gómez  : 1975  MRN: [id number]      Date of Admission:   3/2/2020    Date of Discharge:  3/6/2020    Length of stay:  LOS: 4 days     Hospital Course     Presenting Problem:   Hypokalemia [E87.6]  Abdominal pain, unspecified abdominal location [R10.9]  Constipation, unspecified constipation type [K59.00]  Chest pain, unspecified type [R07.9]      Active Hospital Problems    Diagnosis  POA   • **Chest pain [R07.9]  Yes   • Hypokalemia [E87.6]  Yes   • Abdominal pain [R10.9]  Yes   • PTSD (post-traumatic stress disorder) [F43.10]  Yes   • Hep C w/o coma, chronic (CMS/HCC) [B18.2]  Yes   • Severe malnutrition (CMS/HCC) [E43]  Yes   • Constipation [K59.00]  Yes   • Anemia [D64.9]  Unknown   • Vitamin B12 deficiency [E53.8]  Yes   • Bipolar I disorder, most recent episode depressed (CMS/HCC) [F31.30]  Yes   • Generalized anxiety disorder [F41.1]  Yes      Resolved Hospital Problems   No resolved problems to display.     Primary discharge diagnosis  1.  Multiple electrolyte imbalance including hypokalemia, hypomagnesemia, hypo-phosphatemia  2.  Cachexia with a severe protein calorie malnutrition  3.  Severe constipation due to opiate  4.  Abdominal pain due to constipation  5.  Acute on chronic anemia    Secondary diagnoses  1.  Chronic anemia due to chronic illness/hep C, untreated  2.  Vitamin B 12 deficiency  3.  Bipolar disorder    4.  Generalized anxiety disorder/PTSD  5.  History of substance abuse on chronic Suboxone      Hospital Course:  Elizabeth Gómez is a 44 y.o. female presenting with abdominal pain and constipation for 3 weeks.  It was associated with nausea and vomiting and unable to keep anything down.  Tried on several laxatives enema without effect.  So reported weight loss of 40 pounds over 3 months due to poor oral intake due to anorexia.   Report, this is the worst constipation, although she is taking Suboxone.  Found to have multiple electrolyte imbalance requiring potassium, magnesium and phosphorus replacement.    Also she had non-anion gap metabolic acidosis likely contributed by massive diarrhea from bowel preparation.  Due to the incomplete bowel preparation she had to go through it twice for colonoscopy.  Patient received IV sodium bicarb and p.o. sodium bicarb was prescribed for 3 more days.  She will need repeat BMP in 1 week.    Due to the severe weight loss, patient underwent to EGD and colonoscopy but did not reveal any mass.    Currently she is a tolerating a regular diet.  Abdominal pain has completely resolved.  Was due for Suboxone prescription today.  So she wanted to be discharged today.      Recommendation for Outpatient Providers:     Follow-up with the PCP with a repeat BMP for CO2 and electrolyte  Follow with GI for hep C treatment      Reasons For Change In Medications and Indications for New Medications:  Advised to hold Colace and MiraLAX until diarrhea resolves      Day of Discharge     HPI: feels fine. Denies dizziness. Ambulating without difficulty, no dyspnea      Vital Signs:   Temp:  [98.4 °F (36.9 °C)-99.1 °F (37.3 °C)] 99.1 °F (37.3 °C)  Heart Rate:  [] 98  Resp:  [12-18] 18  BP: ()/(28-66) 89/48     Physical Exam:  Physical Exam   Constitutional: She is oriented to person, place, and time. She appears well-developed and well-nourished. No distress.   HENT:   Head: Normocephalic and atraumatic.   Nose: Nose normal.   Eyes: Pupils are equal, round, and reactive to light. Conjunctivae and EOM are normal.   Neck: Normal range of motion.   Cardiovascular: Normal rate, regular rhythm, normal heart sounds and intact distal pulses.   Pulmonary/Chest: Effort normal and breath sounds normal. No stridor. No respiratory distress.   Abdominal: Soft. Bowel sounds are normal. Not tender or distended. There is no  guarding.   Musculoskeletal: Normal range of motion. She exhibits no edema, tenderness or deformity.   Neurological: She is alert and oriented to person, place, and time. No cranial nerve deficit.   Skin: Skin is warm and dry. No rash noted. She is not diaphoretic. No erythema.   Psychiatric: She has a normal mood and affect. Her behavior is normal.   Nursing note and vitals reviewed.    Pertinent  and/or Most Recent Results     Results from last 7 days   Lab Units 03/06/20  0543 03/05/20  0500 03/04/20  0322 03/03/20  0344 03/02/20 2014 03/02/20  1424   WBC 10*3/mm3 9.50  --   --  9.60  --  9.20   HEMOGLOBIN g/dL 8.6*  --   --  8.9*  --  10.4*   HEMATOCRIT % 27.1*  --   --  27.0*  --  31.4*   PLATELETS 10*3/mm3 201  --   --  243  --  301   SODIUM mmol/L 141  --  144 141  --  139   POTASSIUM mmol/L 3.2* 3.1* 3.9 2.8* 2.6* 2.4*   CHLORIDE mmol/L 120*  --  126* 120*  --  114*   CO2 mmol/L 12.0*  --  10.0* 14.0*  --  14.0*   BUN mg/dL 6  --  7 12  --  16   CREATININE mg/dL 1.06*  --  0.80 0.85  --  1.14*   GLUCOSE mg/dL 110*  --  110* 107*  --  141*   CALCIUM mg/dL 8.2*  --  8.1* 7.7*  --  10.1     Results from last 7 days   Lab Units 03/06/20  0543 03/03/20  0344 03/02/20  1424   BILIRUBIN mg/dL 0.2 0.2 0.2   ALK PHOS U/L 94 94 114   ALT (SGPT) U/L 24 25 29   AST (SGOT) U/L 16 21 19     Results from last 7 days   Lab Units 03/03/20  0344   CHOLESTEROL mg/dL 82   TRIGLYCERIDES mg/dL 63   HDL CHOL mg/dL 30*     Results from last 7 days   Lab Units 03/03/20  0344 03/02/20 2014 03/02/20  1424   TSH uIU/mL 6.560*  --   --    HEMOGLOBIN A1C % 4.9  --   --    TROPONIN T ng/mL <0.010 <0.010 <0.010       Brief Urine Lab Results  (Last result in the past 365 days)      Color   Clarity   Blood   Leuk Est   Nitrite   Protein   CREAT   Urine HCG        03/02/20 1438 Yellow Clear Negative Trace Negative 100 mg/dL (2+)               Microbiology Results Abnormal     None          Ct Abdomen Pelvis Without Contrast    Result Date:  3/2/2020  Impression:  1.  Large amount of high density stool throughout the colon which is distended but improved from prior study as detailed above.  Findings would be compatible with patient's history of constipation.  No evidence of bowel obstruction. 2.  Small areas of groundglass airspace disease in both lung bases which is nonspecific and may be infectious or inflammatory in etiology.  Electronically Signed By-Tony Leon On:3/2/2020 3:14 PM This report was finalized on [id number] by  Tony Leon, .    Ct Chest With Contrast    Result Date: 3/3/2020  Impression:  1.  Borderline enlarged right hilar nodes measuring 1.8 x 1.2 cm.  No other enlarged mediastinal, hilar, or axillary lymph nodes. 2.  No focal airspace consolidation or pleural effusion. 3.  Stable small area of nodular pleural thickening along the undersurface of the minor fissure within the right middle lobe.  Electronically Signed By-Tony Leon On:3/3/2020 11:18 AM This report was finalized on [id number] by  Tony Leon, .    Ct Abdomen Pelvis With Contrast    Result Date: 2/24/2020  Impression:  1. No evidence of appendicitis. 2. Massive colonic fecal retention, constipation. 3. Intra and extrahepatic biliary ductal dilatation, may be due to postcholecystectomy change. This was noted on recent ultrasound exam from January 2020 as well. 4. Additional findings as given above.    Electronically Signed By-Patrice Foreman On:2/24/2020 3:50 PM This report was finalized on [id number] by  Patrice Foreman, .    Xr Chest 1 View    Result Date: 3/2/2020  Impression: No acute chest findings.  Electronically Signed By-Dr. Heather Romo MD On:3/2/2020 2:33 PM This report was finalized on [id number] by Dr. Heather Romo MD.    Xr Chest 1 View    Result Date: 2/24/2020  Impression: No radiographic findings of acute cardiopulmonary abnormality.  Electronically Signed By-DR. Vicente Mendoza MD On:2/24/2020 1:35 PM This report was finalized on  [id number] by DR. Vicente Mendoza MD.      Results for orders placed during the hospital encounter of 08/23/19   Duplex Carotid Ultrasound CAR    Narrative · Proximal right internal carotid artery mild stenosis.  · Proximal left internal carotid artery mild stenosis.          Results for orders placed during the hospital encounter of 08/23/19   Duplex Carotid Ultrasound CAR    Narrative · Proximal right internal carotid artery mild stenosis.  · Proximal left internal carotid artery mild stenosis.          Results for orders placed during the hospital encounter of 08/23/19   Adult Transthoracic Echo Complete W/ Cont if Necessary Per Protocol    Narrative · Estimated EF = 60%.  · Left ventricular systolic function is normal.       Indications  Syncope    Technically satisfactory study.  Mitral valve is structurally normal.  Tricuspid valve is structurally normal.  Aortic valve is structurally normal.  Pulmonic valve could not be well visualized.  No evidence for mitral tricuspid or aortic regurgitation is seen by   Doppler study.  Left atrium is normal in size.  Right atrium is normal in size.  Left ventricle is normal in size and contractility with ejection fraction   of 60%.  Right ventricle is normal in size.  Atrial septum is intact.  Aorta is normal.  No pericardial effusion or intracardiac thrombus is seen.    Impression  Structurally and functionally normal cardiac valves.  Normal echo Doppler study.  Left ventricular ejection fraction is 60%.                   Test Results Pending at Discharge  NA      Procedures Performed  Procedure(s):  COLONOSCOPY         Consults:   Consults     Date and Time Order Name Status Description    3/2/2020 4097 Inpatient Gastroenterology Consult Completed             Discharge Details        Discharge Medications      New Medications      Instructions Start Date   bisacodyl 5 MG EC tablet  Commonly known as:  DULCOLAX   10 mg, Oral, Daily PRN      sodium bicarbonate 650 MG  "tablet   1,300 mg, Oral, 3 Times Daily         Continue These Medications      Instructions Start Date   buprenorphine-naloxone 8-2 MG per SL tablet  Commonly known as:  SUBOXONE   0.5 tablets, Sublingual, 4 Times Daily, (0.5 tab morning, 0.5 tab noon, 0.5 tab afternoon, 0.5 tab bed)      busPIRone 30 MG tablet  Commonly known as:  BUSPAR   30 mg, Oral, 2 Times Daily      docusate sodium 100 MG capsule  Commonly known as:  COLACE   100 mg, Oral, 2 Times Daily      fluticasone 50 MCG/ACT nasal spray  Commonly known as:  FLONASE   2 sprays, Nasal, Daily      LAMICTAL 100 MG tablet  Generic drug:  lamoTRIgine   200 mg, Oral, Every Night at Bedtime      OLANZapine 10 MG tablet  Commonly known as:  zyPREXA   20 mg, Oral, Nightly      polyethylene glycol packet  Commonly known as:  MIRALAX   17 g, Oral, Daily PRN      ROZEREM 8 MG tablet  Generic drug:  ramelteon   8 mg, Oral, Nightly      umeclidinium-vilanterol 62.5-25 MCG/INH aerosol powder  inhaler  Commonly known as:  ANORO ELLIPTA   1 puff, Inhalation, Daily - RT      venlafaxine  MG 24 hr capsule  Commonly known as:  EFFEXOR-XR   150 mg, Oral, Daily             Allergies   Allergen Reactions   • Erythromycin Base Hives   • Lithium GI Intolerance     States lithium caused \"all of her organs to shut down\"         Discharge Disposition:  Home or Self Care    Diet:  Hospital:  Diet Order   Procedures   • Diet Regular         Discharge Activity: As tolerated        CODE STATUS:    Code Status and Medical Interventions:   Ordered at: 03/02/20 1640     Level Of Support Discussed With:    Patient     Code Status:    CPR     Medical Interventions (Level of Support Prior to Arrest):    Full         Follow-up Appointments  No future appointments.          Condition on Discharge:      Stable          Electronically signed by Caroline Crowley MD, 03/06/20, 12:07 PM.    Time: I spent  35  minutes on this discharge activity which included face-to-face encounter with the " patient/reviewing the data in the system/coordination of the care with the nursing staff as well as consultants/documentation/entering orders.

## 2020-03-06 NOTE — PROGRESS NOTES
Nutrition Services    Patient Name:  Elizabeth Gómez  YOB: 1975  MRN: [id number]  Admit Date:  3/2/2020    Progress note:     Diet progressed to regular. Two meals  Recorded at 100% and 50%. + BM 3/4. Visited pt this am who reports her appetite is good. She does not like Boost, but willing to try chocolate magic cups. R/t severe malnutrition adding chocolate magic cups at L/D to provide 580 kcals, 18 gm protein if consumed.     Will continue to monitor.     Electronically signed by:  Manjula Ronquillo RD  03/06/20 11:15 AM

## 2020-03-06 NOTE — NURSING NOTE
Suboxone 8-12mg, 3 1/2 tablets given. No waste button in [employer]. Pharmacy notified. Wasted with Saroj JOEL in med room.

## 2020-03-06 NOTE — PAYOR COMM NOTE
"This is discharge notice for Elizabeth Glaser, auth# pending  Pt discharged routine to home on 3/6/20 .    This case was submitted via fax with PA form and clinical on 3/3, then clinical update sent this morning 3/6. Pt had now discharged.    AUTHORIZATION PENDING:   PLEASE CALL OR FAX DETERMINATION TO CONTACT BELOW. THANK YOU.     SHAAN HALL RN  UTILIZATION REVIEW  [facility]  PH: [phone]  FX: [phone]    Elizabeth Glaser (44 y.o. Female)     Date of Birth Social Security Number Address Home Phone MRN    1975  [id number] [place] [address] [phone] [id number]    Worship Marital Status          None Single       Admission Date Admission Type Admitting Provider Attending Provider Department, Room/Bed    3/2/20 Emergency Caroline Tidwell MD  [facility] 2B MEDICAL INPATIENT, 232/1    Discharge Date Discharge Disposition Discharge Destination        3/6/2020 Home or Self Care              Attending Provider:  (none)   Allergies:  Erythromycin Base, [place]    Isolation:  None   Infection:  None   Code Status:  CPR    Ht:  162.6 cm (64\")   Wt:  44.2 kg (97 lb 7.1 oz)    Admission Cmt:  None   Principal Problem:  Chest pain [R07.9]                 Active Insurance as of 3/2/2020     Primary Coverage     Payor Plan Insurance Group Employer/Plan Group    [place] [employer] [place]      Payor Plan Address Payor Plan Phone Number Payor Plan Fax Number Effective Dates    [address] [phone]  1/1/2019 - None Entered    [facility] [id number]       Subscriber Name Subscriber Birth Date Member ID       ELIZABETH GLASER 1975 [id number]                 Emergency Contacts      (Rel.) Home Phone Work Phone Mobile Phone    CONSUELO DAVILA (Mother) [phone] -- [phone]            Discharge Summary    No notes of this type exist for this encounter.         "

## 2020-03-06 NOTE — PLAN OF CARE
Pt reports feeling better. States appetite improving. Anticipating discharge home today. Continue to monitor.  Problem: Patient Care Overview  Goal: Plan of Care Review  Outcome: Ongoing (interventions implemented as appropriate)  Flowsheets (Taken 3/6/2020 5698)  Progress: improving  Plan of Care Reviewed With: patient  Goal: Individualization and Mutuality  Outcome: Ongoing (interventions implemented as appropriate)  Goal: Discharge Needs Assessment  Outcome: Ongoing (interventions implemented as appropriate)  Goal: Interprofessional Rounds/Family Conf  Outcome: Ongoing (interventions implemented as appropriate)     Problem: Skin Injury Risk (Adult)  Goal: Identify Related Risk Factors and Signs and Symptoms  Outcome: Ongoing (interventions implemented as appropriate)  Goal: Skin Health and Integrity  Outcome: Ongoing (interventions implemented as appropriate)     Problem: Pain, Acute (Adult)  Goal: Identify Related Risk Factors and Signs and Symptoms  Outcome: Ongoing (interventions implemented as appropriate)  Goal: Acceptable Pain Control/Comfort Level  Outcome: Ongoing (interventions implemented as appropriate)     Problem: Fall Risk (Adult)  Goal: Identify Related Risk Factors and Signs and Symptoms  Outcome: Ongoing (interventions implemented as appropriate)  Goal: Absence of Fall  Outcome: Ongoing (interventions implemented as appropriate)

## 2020-03-07 ENCOUNTER — READMISSION MANAGEMENT (OUTPATIENT)
Dept: CALL CENTER | Facility: HOSPITAL | Age: 45
End: 2020-03-07

## 2020-03-07 NOTE — OUTREACH NOTE
Prep Survey      Responses   [facility] facility patient discharged from?  [facility]   Is LACE score < 7 ?  No   Eligibility  Readm Mgmt   Discharge diagnosis  Multiple electrolyte imbalance including hypokalemia, hypomagnesemia, hypo-phosphatemia,    Abdominal pain due to constipation     Does the patient have one of the following disease processes/diagnoses(primary or secondary)?  Other   Does the patient have Home health ordered?  No   Is there a DME ordered?  No   Prep survey completed?  Yes          Jocelyn Dillon RN

## 2020-03-09 NOTE — PAYOR COMM NOTE
"Patient has discharged.  See attached DC summary. AUTH # [id number]          Kayleigh Barnes RN  Utilization Review          [facility]   [address] [place]  [address] [phone] Office  [phone]  Fax  [email or web address]   [facility]       Elizabeth Glaser (44 y.o. Female)     Date of Birth Social Security Number Address Home Phone MRN    1975  [address] [place] [address] [phone] [id number]    Shinto Marital Status          None Single       Admission Date Admission Type Admitting Provider Attending Provider Department, Room/Bed    3/2/20 Emergency Caroline Tidwell MD  [facility] 2B MEDICAL INPATIENT,     Discharge Date Discharge Disposition Discharge Destination        3/6/2020 Home or Self Care              Attending Provider:  (none)   Allergies:  Erythromycin Base, [place]    Isolation:  None   Infection:  None   Code Status:  Prior    Ht:  162.6 cm (64\")   Wt:  44.2 kg (97 lb 7.1 oz)    Admission Cmt:  None   Principal Problem:  Chest pain [R07.9]                 Active Insurance as of 3/2/2020     Primary Coverage     Payor Plan Insurance Group Employer/Plan Group    [place] [employer] [facility]      Payor Plan Address Payor Plan Phone Number Payor Plan Fax Number Effective Dates    [address] [phone]  2019 - None [place]tered    [place] [address]       Subscriber Name Subscriber Birth Date Member ID       ELIZABETH GLASER 1975 [id number]                 Emergency Contacts      (Rel.) Home Phone Work Phone Mobile Phone    CONSUELO DAVILA (Mother) [phone] -- [phone]               Discharge Summary      Caroline Tidwell MD at 20 1207                [facility] Medicine Services  DISCHARGE SUMMARY        Prepared For PCP:  Blanca Welch APRN    Patient Name: Elizabeth Glaser  : 1975  MRN: [id number]      Date of Admission:   3/2/2020    Date " of Discharge:  3/6/2020    Length of stay:  LOS: 4 days     Hospital Course     Presenting Problem:   Hypokalemia [E87.6]  Abdominal pain, unspecified abdominal location [R10.9]  Constipation, unspecified constipation type [K59.00]  Chest pain, unspecified type [R07.9]      Active Hospital Problems    Diagnosis  POA   • **Chest pain [R07.9]  Yes   • Hypokalemia [E87.6]  Yes   • Abdominal pain [R10.9]  Yes   • PTSD (post-traumatic stress disorder) [F43.10]  Yes   • Hep C w/o coma, chronic (CMS/HCC) [B18.2]  Yes   • Severe malnutrition (CMS/HCC) [E43]  Yes   • Constipation [K59.00]  Yes   • Anemia [D64.9]  Unknown   • Vitamin B12 deficiency [E53.8]  Yes   • Bipolar I disorder, most recent episode depressed (CMS/HCC) [F31.30]  Yes   • Generalized anxiety disorder [F41.1]  Yes      Resolved Hospital Problems   No resolved problems to display.     Primary discharge diagnosis  1.  Multiple electrolyte imbalance including hypokalemia, hypomagnesemia, hypo-phosphatemia  2.  Cachexia with a severe protein calorie malnutrition  3.  Severe constipation due to opiate  4.  Abdominal pain due to constipation  5.  Acute on chronic anemia    Secondary diagnoses  1.  Chronic anemia due to chronic illness/hep C, untreated  2.  Vitamin B 12 deficiency  3.  Bipolar disorder    4.  Generalized anxiety disorder/PTSD  5.  History of substance abuse on chronic Suboxone      Hospital Course:  Elizabeth Gómez is a 44 y.o. female presenting with abdominal pain and constipation for 3 weeks.  It was associated with nausea and vomiting and unable to keep anything down.  Tried on several laxatives enema without effect.  So reported weight loss of 40 pounds over 3 months due to poor oral intake due to anorexia.  Report, this is the worst constipation, although she is taking Suboxone.  Found to have multiple electrolyte imbalance requiring potassium, magnesium and phosphorus replacement.    Also she had non-anion gap metabolic acidosis likely  contributed by massive diarrhea from bowel preparation.  Due to the incomplete bowel preparation she had to go through it twice for colonoscopy.  Patient received IV sodium bicarb and p.o. sodium bicarb was prescribed for 3 more days.  She will need repeat BMP in 1 week.    Due to the severe weight loss, patient underwent to EGD and colonoscopy but did not reveal any mass.    Currently she is a tolerating a regular diet.  Abdominal pain has completely resolved.  Was due for Suboxone prescription today.  So she wanted to be discharged today.      Recommendation for Outpatient Providers:     Follow-up with the PCP with a repeat BMP for CO2 and electrolyte  Follow with GI for hep C treatment      Reasons For Change In Medications and Indications for New Medications:  Advised to hold Colace and MiraLAX until diarrhea resolves      Day of Discharge     HPI: feels fine. Denies dizziness. Ambulating without difficulty, no dyspnea      Vital Signs:   Temp:  [98.4 °F (36.9 °C)-99.1 °F (37.3 °C)] 99.1 °F (37.3 °C)  Heart Rate:  [] 98  Resp:  [12-18] 18  BP: ()/(28-66) 89/48     Physical Exam:  Physical Exam   Constitutional: She is oriented to person, place, and time. She appears well-developed and well-nourished. No distress.   HENT:   Head: Normocephalic and atraumatic.   Nose: Nose normal.   Eyes: Pupils are equal, round, and reactive to light. Conjunctivae and EOM are normal.   Neck: Normal range of motion.   Cardiovascular: Normal rate, regular rhythm, normal heart sounds and intact distal pulses.   Pulmonary/Chest: Effort normal and breath sounds normal. No stridor. No respiratory distress.   Abdominal: Soft. Bowel sounds are normal. Not tender or distended. There is no guarding.   Musculoskeletal: Normal range of motion. She exhibits no edema, tenderness or deformity.   Neurological: She is alert and oriented to person, place, and time. No cranial nerve deficit.   Skin: Skin is warm and dry. No rash noted.  She is not diaphoretic. No erythema.   Psychiatric: She has a normal mood and affect. Her behavior is normal.   Nursing note and vitals reviewed.    Pertinent  and/or Most Recent Results     Results from last 7 days   Lab Units 03/06/20  0543 03/05/20  0500 03/04/20  0322 03/03/20  0344 03/02/20 2014 03/02/20  1424   WBC 10*3/mm3 9.50  --   --  9.60  --  9.20   HEMOGLOBIN g/dL 8.6*  --   --  8.9*  --  10.4*   HEMATOCRIT % 27.1*  --   --  27.0*  --  31.4*   PLATELETS 10*3/mm3 201  --   --  243  --  301   SODIUM mmol/L 141  --  144 141  --  139   POTASSIUM mmol/L 3.2* 3.1* 3.9 2.8* 2.6* 2.4*   CHLORIDE mmol/L 120*  --  126* 120*  --  114*   CO2 mmol/L 12.0*  --  10.0* 14.0*  --  14.0*   BUN mg/dL 6  --  7 12  --  16   CREATININE mg/dL 1.06*  --  0.80 0.85  --  1.14*   GLUCOSE mg/dL 110*  --  110* 107*  --  141*   CALCIUM mg/dL 8.2*  --  8.1* 7.7*  --  10.1     Results from last 7 days   Lab Units 03/06/20  0543 03/03/20  0344 03/02/20  1424   BILIRUBIN mg/dL 0.2 0.2 0.2   ALK PHOS U/L 94 94 114   ALT (SGPT) U/L 24 25 29   AST (SGOT) U/L 16 21 19     Results from last 7 days   Lab Units 03/03/20  0344   CHOLESTEROL mg/dL 82   TRIGLYCERIDES mg/dL 63   HDL CHOL mg/dL 30*     Results from last 7 days   Lab Units 03/03/20  0344 03/02/20 2014 03/02/20  1424   TSH uIU/mL 6.560*  --   --    HEMOGLOBIN A1C % 4.9  --   --    TROPONIN T ng/mL <0.010 <0.010 <0.010       Brief Urine Lab Results  (Last result in the past 365 days)      Color   Clarity   Blood   Leuk Est   Nitrite   Protein   CREAT   Urine HCG        03/02/20 1438 Yellow Clear Negative Trace Negative 100 mg/dL (2+)               Microbiology Results Abnormal     None          Ct Abdomen Pelvis Without Contrast    Result Date: 3/2/2020  Impression:  1.  Large amount of high density stool throughout the colon which is distended but improved from prior study as detailed above.  Findings would be compatible with patient's history of constipation.  No evidence of bowel  obstruction. 2.  Small areas of groundglass airspace disease in both lung bases which is nonspecific and may be infectious or inflammatory in etiology.  Electronically Signed By-Tony Leon On:3/2/2020 3:14 PM This report was finalized on [id number] by  Tony Leon, .    Ct Chest With Contrast    Result Date: 3/3/2020  Impression:  1.  Borderline enlarged right hilar nodes measuring 1.8 x 1.2 cm.  No other enlarged mediastinal, hilar, or axillary lymph nodes. 2.  No focal airspace consolidation or pleural effusion. 3.  Stable small area of nodular pleural thickening along the undersurface of the minor fissure within the right middle lobe.  Electronically Signed By-Tony Leon On:3/3/2020 11:18 AM This report was finalized on [id number] by  Tony Leon, .    Ct Abdomen Pelvis With Contrast    Result Date: 2/24/2020  Impression:  1. No evidence of appendicitis. 2. Massive colonic fecal retention, constipation. 3. Intra and extrahepatic biliary ductal dilatation, may be due to postcholecystectomy change. This was noted on recent ultrasound exam from January 2020 as well. 4. Additional findings as given above.    Electronically Signed By-Patrice Foreman On:2/24/2020 3:50 PM This report was finalized on [id number] by  Patrice Foreman, .    Xr Chest 1 View    Result Date: 3/2/2020  Impression: No acute chest findings.  Electronically Signed By-Dr. Heather Romo MD On:3/2/2020 2:33 PM This report was finalized on [id number] by Dr. Heather Romo MD.    Xr Chest 1 View    Result Date: 2/24/2020  Impression: No radiographic findings of acute cardiopulmonary abnormality.  Electronically Signed By-DR. Vicente Mendoza MD On:2/24/2020 1:35 PM This report was finalized on [id number] by DR. Vicente Mendoza MD.      Results for orders placed during the hospital encounter of 08/23/19   Duplex Carotid Ultrasound CAR    Narrative · Proximal right internal carotid artery mild stenosis.  · Proximal left internal carotid  artery mild stenosis.          Results for orders placed during the hospital encounter of 08/23/19   Duplex Carotid Ultrasound CAR    Narrative · Proximal right internal carotid artery mild stenosis.  · Proximal left internal carotid artery mild stenosis.          Results for orders placed during the hospital encounter of 08/23/19   Adult Transthoracic Echo Complete W/ Cont if Necessary Per Protocol    Narrative · Estimated EF = 60%.  · Left ventricular systolic function is normal.       Indications  Syncope    Technically satisfactory study.  Mitral valve is structurally normal.  Tricuspid valve is structurally normal.  Aortic valve is structurally normal.  Pulmonic valve could not be well visualized.  No evidence for mitral tricuspid or aortic regurgitation is seen by   Doppler study.  Left atrium is normal in size.  Right atrium is normal in size.  Left ventricle is normal in size and contractility with ejection fraction   of 60%.  Right ventricle is normal in size.  Atrial septum is intact.  Aorta is normal.  No pericardial effusion or intracardiac thrombus is seen.    Impression  Structurally and functionally normal cardiac valves.  Normal echo Doppler study.  Left ventricular ejection fraction is 60%.                   Test Results Pending at Discharge  NA      Procedures Performed  Procedure(s):  COLONOSCOPY         Consults:   Consults     Date and Time Order Name Status Description    3/2/2020 4640 Inpatient Gastroenterology Consult Completed             Discharge Details        Discharge Medications      New Medications      Instructions Start Date   bisacodyl 5 MG EC tablet  Commonly known as:  DULCOLAX   10 mg, Oral, Daily PRN      sodium bicarbonate 650 MG tablet   1,300 mg, Oral, 3 Times Daily         Continue These Medications      Instructions Start Date   buprenorphine-naloxone 8-2 MG per SL tablet  Commonly known as:  SUBOXONE   0.5 tablets, Sublingual, 4 Times Daily, (0.5 tab morning, 0.5 tab  "noon, 0.5 tab afternoon, 0.5 tab bed)      busPIRone 30 MG tablet  Commonly known as:  BUSPAR   30 mg, Oral, 2 Times Daily      docusate sodium 100 MG capsule  Commonly known as:  COLACE   100 mg, Oral, 2 Times Daily      fluticasone 50 MCG/ACT nasal spray  Commonly known as:  FLONASE   2 sprays, Nasal, Daily      LAMICTAL 100 MG tablet  Generic drug:  lamoTRIgine   200 mg, Oral, Every Night at Bedtime      OLANZapine 10 MG tablet  Commonly known as:  zyPREXA   20 mg, Oral, Nightly      polyethylene glycol packet  Commonly known as:  MIRALAX   17 g, Oral, Daily PRN      ROZEREM 8 MG tablet  Generic drug:  ramelteon   8 mg, Oral, Nightly      umeclidinium-vilanterol 62.5-25 MCG/INH aerosol powder  inhaler  Commonly known as:  ANORO ELLIPTA   1 puff, Inhalation, Daily - RT      venlafaxine  MG 24 hr capsule  Commonly known as:  EFFEXOR-XR   150 mg, Oral, Daily             Allergies   Allergen Reactions   • Erythromycin Base Hives   • Lithium GI Intolerance     States lithium caused \"all of her organs to shut down\"         Discharge Disposition:  Home or Self Care    Diet:  Hospital:  Diet Order   Procedures   • Diet Regular         Discharge Activity: As tolerated        CODE STATUS:    Code Status and Medical Interventions:   Ordered at: 03/02/20 1640     Level Of Support Discussed With:    Patient     Code Status:    CPR     Medical Interventions (Level of Support Prior to Arrest):    Full         Follow-up Appointments  No future appointments.          Condition on Discharge:      Stable          Electronically signed by Caroline Crowley MD, 03/06/20, 12:07 PM.    Time: I spent  35  minutes on this discharge activity which included face-to-face encounter with the patient/reviewing the data in the system/coordination of the care with the nursing staff as well as consultants/documentation/entering orders.      Electronically signed by Caroline Tidwell MD at 03/06/20 1926       "

## 2020-03-10 ENCOUNTER — READMISSION MANAGEMENT (OUTPATIENT)
Dept: CALL CENTER | Facility: HOSPITAL | Age: 45
End: 2020-03-10

## 2020-03-10 NOTE — OUTREACH NOTE
Medical Week 1 Survey      Responses   [facility] patient discharged from?  [facility]   Does the patient have one of the following disease processes/diagnoses(primary or secondary)?  Other   Is there a successful TCM telephone encounter documented?  No   Week 1 attempt successful?  Yes   Call start time  1544   Call end time  1548   Discharge diagnosis  Multiple electrolyte imbalance including hypokalemia, hypomagnesemia, hypo-phosphatemia,    Abdominal pain due to constipation     Meds reviewed with patient/caregiver?  Yes   Is the patient having any side effects they believe may be caused by any medication additions or changes?  No   Does the patient have all medications ordered at discharge?  Yes   Does the patient have a primary care provider?   Yes   Does the patient have an appointment with their PCP within 7 days of discharge?  Yes   Has the patient kept scheduled appointments due by today?  N/A   Has home health visited the patient within 72 hours of discharge?  N/A   Did the patient receive a copy of their discharge instructions?  Yes   Nursing interventions  Reviewed instructions with patient   What is the patient's perception of their health status since discharge?  Improving   Is the patient/caregiver able to teach back signs and symptoms related to disease process for when to call PCP?  Yes   Is the patient/caregiver able to teach back signs and symptoms related to disease process for when to call 911?  Yes   Is the patient/caregiver able to teach back the hierarchy of who to call/visit for symptoms/problems? PCP, Specialist, Home health nurse, Urgent Care, ED, 911  Yes   Additional teach back comments  patient states she is doing well   Week 1 call completed?  Yes   Graduated  Yes   Did the patient feel the follow up calls were helpful during their recovery period?  Yes   Was the number of calls appropriate?  Yes   Graduated/Revoked comments  No questions or needs at this time          Rosita  Carol, STEPHANIEN

## 2020-05-19 ENCOUNTER — TRANSCRIBE ORDERS (OUTPATIENT)
Dept: ADMINISTRATIVE | Facility: HOSPITAL | Age: 45
End: 2020-05-19

## 2020-05-19 DIAGNOSIS — N28.9 ABNORMAL RENAL FUNCTION: Primary | ICD-10-CM

## 2020-05-28 ENCOUNTER — TRANSCRIBE ORDERS (OUTPATIENT)
Dept: ADMINISTRATIVE | Facility: HOSPITAL | Age: 45
End: 2020-05-28

## 2020-05-28 ENCOUNTER — LAB (OUTPATIENT)
Dept: LAB | Facility: HOSPITAL | Age: 45
End: 2020-05-28

## 2020-05-28 ENCOUNTER — HOSPITAL ENCOUNTER (OUTPATIENT)
Dept: ULTRASOUND IMAGING | Facility: HOSPITAL | Age: 45
Discharge: HOME | End: 2020-05-28
Admitting: INTERNAL MEDICINE

## 2020-05-28 DIAGNOSIS — N28.89 MASS OF KIDNEY WITH IMPAIRED RENAL FUNCTION: Primary | ICD-10-CM

## 2020-05-28 DIAGNOSIS — N28.89 MASS OF KIDNEY WITH IMPAIRED RENAL FUNCTION: ICD-10-CM

## 2020-05-28 DIAGNOSIS — N28.9 ABNORMAL RENAL FUNCTION: ICD-10-CM

## 2020-05-28 LAB
ANION GAP SERPL CALCULATED.3IONS-SCNC: 14.1 MMOL/L (ref 5–15)
BACTERIA UR QL AUTO: NORMAL /HPF
BILIRUB UR QL STRIP: NEGATIVE
BUN BLD-MCNC: 17 MG/DL (ref 6–20)
BUN/CREAT SERPL: 16.7 (ref 7–25)
CALCIUM SPEC-SCNC: 9 MG/DL (ref 8.6–10.5)
CHLORIDE SERPL-SCNC: 112 MMOL/L (ref 98–107)
CLARITY UR: CLEAR
CO2 SERPL-SCNC: 11.9 MMOL/L (ref 22–29)
COLOR UR: YELLOW
CREAT BLD-MCNC: 1.02 MG/DL (ref 0.57–1)
CREAT UR-MCNC: 47.7 MG/DL
GFR SERPL CREATININE-BSD FRML MDRD: 59 ML/MIN/1.73
GLUCOSE BLD-MCNC: 91 MG/DL (ref 65–99)
GLUCOSE UR STRIP-MCNC: NEGATIVE MG/DL
HGB UR QL STRIP.AUTO: NEGATIVE
HYALINE CASTS UR QL AUTO: NORMAL /LPF
KETONES UR QL STRIP: NEGATIVE
LEUKOCYTE ESTERASE UR QL STRIP.AUTO: NEGATIVE
NITRITE UR QL STRIP: NEGATIVE
PH UR STRIP.AUTO: 6.5 [PH] (ref 5–8)
POTASSIUM BLD-SCNC: 4 MMOL/L (ref 3.5–5.2)
PROT UR QL STRIP: ABNORMAL
PROT UR-MCNC: 54 MG/DL
RBC # UR: NORMAL /HPF
REF LAB TEST METHOD: NORMAL
SODIUM BLD-SCNC: 138 MMOL/L (ref 136–145)
SP GR UR STRIP: 1.02 (ref 1–1.03)
SQUAMOUS #/AREA URNS HPF: NORMAL /HPF
UROBILINOGEN UR QL STRIP: ABNORMAL
WBC UR QL AUTO: NORMAL /HPF

## 2020-05-28 PROCEDURE — 82570 ASSAY OF URINE CREATININE: CPT

## 2020-05-28 PROCEDURE — 84156 ASSAY OF PROTEIN URINE: CPT

## 2020-05-28 PROCEDURE — 86335 IMMUNFIX E-PHORSIS/URINE/CSF: CPT

## 2020-05-28 PROCEDURE — 86334 IMMUNOFIX E-PHORESIS SERUM: CPT

## 2020-05-28 PROCEDURE — 36415 COLL VENOUS BLD VENIPUNCTURE: CPT

## 2020-05-28 PROCEDURE — 80048 BASIC METABOLIC PNL TOTAL CA: CPT

## 2020-05-28 PROCEDURE — 76775 US EXAM ABDO BACK WALL LIM: CPT

## 2020-05-28 PROCEDURE — 82784 ASSAY IGA/IGD/IGG/IGM EACH: CPT

## 2020-05-28 PROCEDURE — 81001 URINALYSIS AUTO W/SCOPE: CPT

## 2020-05-29 LAB
IGA SERPL-MCNC: 193 MG/DL (ref 87–352)
IGG SERPL-MCNC: 913 MG/DL (ref 586–1602)
IGM SERPL-MCNC: 125 MG/DL (ref 26–217)
PROT PATTERN SERPL IFE-IMP: NORMAL

## 2020-06-01 ENCOUNTER — OFFICE (AMBULATORY)
Dept: URBAN - METROPOLITAN AREA CLINIC 64 | Facility: CLINIC | Age: 45
End: 2020-06-01
Payer: COMMERCIAL

## 2020-06-01 VITALS — HEIGHT: 64 IN | SYSTOLIC BLOOD PRESSURE: 110 MMHG | HEART RATE: 110 BPM | DIASTOLIC BLOOD PRESSURE: 75 MMHG

## 2020-06-01 DIAGNOSIS — B18.2 CHRONIC VIRAL HEPATITIS C: ICD-10-CM

## 2020-06-01 DIAGNOSIS — K59.00 CONSTIPATION, UNSPECIFIED: ICD-10-CM

## 2020-06-01 DIAGNOSIS — R10.84 GENERALIZED ABDOMINAL PAIN: ICD-10-CM

## 2020-06-01 PROCEDURE — 99214 OFFICE O/P EST MOD 30 MIN: CPT | Performed by: INTERNAL MEDICINE

## 2020-06-01 RX ORDER — DICYCLOMINE HYDROCHLORIDE 20 MG/1
60 TABLET ORAL
Qty: 90 | Refills: 11 | Status: ACTIVE
Start: 2020-06-01

## 2020-06-01 RX ORDER — PRUCALOPRIDE 2 MG/1
2 TABLET, FILM COATED ORAL
Qty: 30 | Refills: 11 | Status: ACTIVE
Start: 2020-06-01

## 2020-06-01 RX ORDER — PANTOPRAZOLE SODIUM 40 MG/1
TABLET, DELAYED RELEASE ORAL
Qty: 0 | Refills: 0 | Status: ACTIVE

## 2020-06-03 LAB — INTERPRETATION UR IFE-IMP: NORMAL

## 2020-07-22 ENCOUNTER — OFFICE (AMBULATORY)
Dept: URBAN - METROPOLITAN AREA CLINIC 64 | Facility: CLINIC | Age: 45
End: 2020-07-22
Payer: COMMERCIAL

## 2020-07-22 VITALS
SYSTOLIC BLOOD PRESSURE: 109 MMHG | WEIGHT: 101 LBS | HEIGHT: 64 IN | HEART RATE: 112 BPM | DIASTOLIC BLOOD PRESSURE: 75 MMHG

## 2020-07-22 DIAGNOSIS — K59.00 CONSTIPATION, UNSPECIFIED: ICD-10-CM

## 2020-07-22 DIAGNOSIS — B18.2 CHRONIC VIRAL HEPATITIS C: ICD-10-CM

## 2020-07-22 DIAGNOSIS — R11.0 NAUSEA: ICD-10-CM

## 2020-07-22 PROCEDURE — 99214 OFFICE O/P EST MOD 30 MIN: CPT | Performed by: INTERNAL MEDICINE

## 2020-07-22 RX ORDER — LINACLOTIDE 290 UG/1
290 CAPSULE, GELATIN COATED ORAL
Qty: 90 | Refills: 3 | Status: ACTIVE
Start: 2020-07-22

## 2020-10-14 ENCOUNTER — OFFICE (AMBULATORY)
Dept: URBAN - METROPOLITAN AREA CLINIC 64 | Facility: CLINIC | Age: 45
End: 2020-10-14
Payer: COMMERCIAL

## 2020-10-14 VITALS
HEART RATE: 133 BPM | DIASTOLIC BLOOD PRESSURE: 82 MMHG | WEIGHT: 109 LBS | HEIGHT: 64 IN | SYSTOLIC BLOOD PRESSURE: 152 MMHG

## 2020-10-14 DIAGNOSIS — K59.00 CONSTIPATION, UNSPECIFIED: ICD-10-CM

## 2020-10-14 DIAGNOSIS — B18.2 CHRONIC VIRAL HEPATITIS C: ICD-10-CM

## 2020-10-14 PROCEDURE — 99213 OFFICE O/P EST LOW 20 MIN: CPT | Performed by: INTERNAL MEDICINE

## 2021-02-03 PROBLEM — H69.91 EUSTACHIAN TUBE DYSFUNCTION, RIGHT: Status: ACTIVE | Noted: 2018-07-27

## 2021-02-03 PROBLEM — H69.81 EUSTACHIAN TUBE DYSFUNCTION, RIGHT: Status: ACTIVE | Noted: 2018-07-27

## 2021-02-03 PROBLEM — J20.9 ACUTE BRONCHITIS: Status: ACTIVE | Noted: 2018-09-20

## 2021-02-03 PROBLEM — R11.0 NAUSEA: Status: ACTIVE | Noted: 2018-07-27

## 2021-02-04 ENCOUNTER — APPOINTMENT (OUTPATIENT)
Dept: GENERAL RADIOLOGY | Facility: HOSPITAL | Age: 46
End: 2021-02-04

## 2021-02-04 ENCOUNTER — HOSPITAL ENCOUNTER (OUTPATIENT)
Facility: HOSPITAL | Age: 46
Setting detail: OBSERVATION
Discharge: HOME | End: 2021-02-06
Attending: INTERNAL MEDICINE | Admitting: INTERNAL MEDICINE

## 2021-02-04 DIAGNOSIS — R64 CACHECTIC (HCC): ICD-10-CM

## 2021-02-04 DIAGNOSIS — D64.9 CHRONIC ANEMIA: ICD-10-CM

## 2021-02-04 DIAGNOSIS — E86.0 DEHYDRATION: ICD-10-CM

## 2021-02-04 DIAGNOSIS — E87.6 HYPOKALEMIA: Primary | ICD-10-CM

## 2021-02-04 LAB
ALBUMIN SERPL-MCNC: 2.8 G/DL (ref 3.5–5.2)
ALBUMIN/GLOB SERPL: 1.3 G/DL
ALP SERPL-CCNC: 139 U/L (ref 39–117)
ALT SERPL W P-5'-P-CCNC: 22 U/L (ref 1–33)
ANION GAP SERPL CALCULATED.3IONS-SCNC: 11 MMOL/L (ref 5–15)
ANION GAP SERPL CALCULATED.3IONS-SCNC: 12 MMOL/L (ref 5–15)
AST SERPL-CCNC: 22 U/L (ref 1–32)
BASOPHILS # BLD AUTO: 0 10*3/MM3 (ref 0–0.2)
BASOPHILS NFR BLD AUTO: 0.4 % (ref 0–1.5)
BILIRUB SERPL-MCNC: 0.2 MG/DL (ref 0–1.2)
BUN SERPL-MCNC: 15 MG/DL (ref 6–20)
BUN SERPL-MCNC: 15 MG/DL (ref 6–20)
BUN/CREAT SERPL: 11.7 (ref 7–25)
BUN/CREAT SERPL: 12.7 (ref 7–25)
CALCIUM SPEC-SCNC: 7.9 MG/DL (ref 8.6–10.5)
CALCIUM SPEC-SCNC: 8.7 MG/DL (ref 8.6–10.5)
CHLORIDE SERPL-SCNC: 116 MMOL/L (ref 98–107)
CHLORIDE SERPL-SCNC: 120 MMOL/L (ref 98–107)
CO2 SERPL-SCNC: 13 MMOL/L (ref 22–29)
CO2 SERPL-SCNC: 14 MMOL/L (ref 22–29)
CREAT SERPL-MCNC: 1.18 MG/DL (ref 0.57–1)
CREAT SERPL-MCNC: 1.28 MG/DL (ref 0.57–1)
DEPRECATED RDW RBC AUTO: 49.4 FL (ref 37–54)
EOSINOPHIL # BLD AUTO: 0.1 10*3/MM3 (ref 0–0.4)
EOSINOPHIL NFR BLD AUTO: 0.8 % (ref 0.3–6.2)
ERYTHROCYTE [DISTWIDTH] IN BLOOD BY AUTOMATED COUNT: 15.6 % (ref 12.3–15.4)
GFR SERPL CREATININE-BSD FRML MDRD: 46 ML/MIN/1.73
GFR SERPL CREATININE-BSD FRML MDRD: 50 ML/MIN/1.73
GLOBULIN UR ELPH-MCNC: 2.2 GM/DL
GLUCOSE SERPL-MCNC: 79 MG/DL (ref 65–99)
GLUCOSE SERPL-MCNC: 84 MG/DL (ref 65–99)
HCT VFR BLD AUTO: 29.3 % (ref 34–46.6)
HGB BLD-MCNC: 10 G/DL (ref 12–15.9)
HOLD SPECIMEN: NORMAL
INR PPP: 1 (ref 0.93–1.1)
LYMPHOCYTES # BLD AUTO: 3 10*3/MM3 (ref 0.7–3.1)
LYMPHOCYTES NFR BLD AUTO: 33.7 % (ref 19.6–45.3)
MAGNESIUM SERPL-MCNC: 2.1 MG/DL (ref 1.6–2.6)
MCH RBC QN AUTO: 30.9 PG (ref 26.6–33)
MCHC RBC AUTO-ENTMCNC: 34.2 G/DL (ref 31.5–35.7)
MCV RBC AUTO: 90.4 FL (ref 79–97)
MONOCYTES # BLD AUTO: 0.5 10*3/MM3 (ref 0.1–0.9)
MONOCYTES NFR BLD AUTO: 5.1 % (ref 5–12)
NEUTROPHILS NFR BLD AUTO: 5.4 10*3/MM3 (ref 1.7–7)
NEUTROPHILS NFR BLD AUTO: 60 % (ref 42.7–76)
NRBC BLD AUTO-RTO: 0.1 /100 WBC (ref 0–0.2)
PLATELET # BLD AUTO: 314 10*3/MM3 (ref 140–450)
PMV BLD AUTO: 8.6 FL (ref 6–12)
POTASSIUM SERPL-SCNC: 2.8 MMOL/L (ref 3.5–5.2)
POTASSIUM SERPL-SCNC: 3.1 MMOL/L (ref 3.5–5.2)
PROT SERPL-MCNC: 5 G/DL (ref 6–8.5)
PROTHROMBIN TIME: 11 SECONDS (ref 9.6–11.7)
RBC # BLD AUTO: 3.24 10*6/MM3 (ref 3.77–5.28)
SARS-COV-2 ORF1AB RESP QL NAA+PROBE: NOT DETECTED
SODIUM SERPL-SCNC: 142 MMOL/L (ref 136–145)
SODIUM SERPL-SCNC: 144 MMOL/L (ref 136–145)
TROPONIN T SERPL-MCNC: 0.02 NG/ML (ref 0–0.03)
TROPONIN T SERPL-MCNC: <0.01 NG/ML (ref 0–0.03)
TSH SERPL DL<=0.05 MIU/L-ACNC: 4.76 UIU/ML (ref 0.27–4.2)
WBC # BLD AUTO: 9 10*3/MM3 (ref 3.4–10.8)
WHOLE BLOOD HOLD SPECIMEN: NORMAL
WHOLE BLOOD HOLD SPECIMEN: NORMAL

## 2021-02-04 PROCEDURE — 99219 PR INITIAL OBSERVATION CARE/DAY 50 MINUTES: CPT | Performed by: INTERNAL MEDICINE

## 2021-02-04 PROCEDURE — 96366 THER/PROPH/DIAG IV INF ADDON: CPT

## 2021-02-04 PROCEDURE — G0378 HOSPITAL OBSERVATION PER HR: HCPCS

## 2021-02-04 PROCEDURE — 25010000003 POTASSIUM CHLORIDE 10 MEQ/100ML SOLUTION: Performed by: NURSE PRACTITIONER

## 2021-02-04 PROCEDURE — 93005 ELECTROCARDIOGRAM TRACING: CPT

## 2021-02-04 PROCEDURE — 84443 ASSAY THYROID STIM HORMONE: CPT | Performed by: NURSE PRACTITIONER

## 2021-02-04 PROCEDURE — 83735 ASSAY OF MAGNESIUM: CPT | Performed by: NURSE PRACTITIONER

## 2021-02-04 PROCEDURE — 25010000003 POTASSIUM CHLORIDE 10 MEQ/100ML SOLUTION: Performed by: INTERNAL MEDICINE

## 2021-02-04 PROCEDURE — 71045 X-RAY EXAM CHEST 1 VIEW: CPT

## 2021-02-04 PROCEDURE — 84132 ASSAY OF SERUM POTASSIUM: CPT | Performed by: INTERNAL MEDICINE

## 2021-02-04 PROCEDURE — 96365 THER/PROPH/DIAG IV INF INIT: CPT

## 2021-02-04 PROCEDURE — 84484 ASSAY OF TROPONIN QUANT: CPT | Performed by: NURSE PRACTITIONER

## 2021-02-04 PROCEDURE — 99284 EMERGENCY DEPT VISIT MOD MDM: CPT

## 2021-02-04 PROCEDURE — 93005 ELECTROCARDIOGRAM TRACING: CPT | Performed by: NURSE PRACTITIONER

## 2021-02-04 PROCEDURE — C9803 HOPD COVID-19 SPEC COLLECT: HCPCS

## 2021-02-04 PROCEDURE — U0004 COV-19 TEST NON-CDC HGH THRU: HCPCS

## 2021-02-04 PROCEDURE — 85025 COMPLETE CBC W/AUTO DIFF WBC: CPT | Performed by: NURSE PRACTITIONER

## 2021-02-04 PROCEDURE — 85610 PROTHROMBIN TIME: CPT | Performed by: NURSE PRACTITIONER

## 2021-02-04 PROCEDURE — 80053 COMPREHEN METABOLIC PANEL: CPT | Performed by: NURSE PRACTITIONER

## 2021-02-04 PROCEDURE — 93005 ELECTROCARDIOGRAM TRACING: CPT | Performed by: INTERNAL MEDICINE

## 2021-02-04 RX ORDER — POTASSIUM CHLORIDE 7.45 MG/ML
10 INJECTION INTRAVENOUS ONCE
Status: COMPLETED | OUTPATIENT
Start: 2021-02-04 | End: 2021-02-04

## 2021-02-04 RX ORDER — SODIUM BICARBONATE 650 MG/1
650 TABLET ORAL 3 TIMES DAILY
COMMUNITY

## 2021-02-04 RX ORDER — VENLAFAXINE HYDROCHLORIDE 75 MG/1
150 CAPSULE, EXTENDED RELEASE ORAL
Status: DISCONTINUED | OUTPATIENT
Start: 2021-02-05 | End: 2021-02-06 | Stop reason: HOSPADM

## 2021-02-04 RX ORDER — BUSPIRONE HYDROCHLORIDE 15 MG/1
30 TABLET ORAL EVERY 12 HOURS SCHEDULED
Status: DISCONTINUED | OUTPATIENT
Start: 2021-02-04 | End: 2021-02-06 | Stop reason: HOSPADM

## 2021-02-04 RX ORDER — OMEGA-3S/DHA/EPA/FISH OIL/D3 300MG-1000
3000 CAPSULE ORAL DAILY
COMMUNITY

## 2021-02-04 RX ORDER — POTASSIUM CHLORIDE 20 MEQ/1
40 TABLET, EXTENDED RELEASE ORAL DAILY
Status: DISCONTINUED | OUTPATIENT
Start: 2021-02-04 | End: 2021-02-04

## 2021-02-04 RX ORDER — LAMOTRIGINE 100 MG/1
100 TABLET ORAL DAILY
Status: DISCONTINUED | OUTPATIENT
Start: 2021-02-05 | End: 2021-02-06 | Stop reason: HOSPADM

## 2021-02-04 RX ORDER — ASPIRIN 81 MG/1
324 TABLET, CHEWABLE ORAL ONCE
Status: COMPLETED | OUTPATIENT
Start: 2021-02-04 | End: 2021-02-04

## 2021-02-04 RX ORDER — PRAMIPEXOLE DIHYDROCHLORIDE 0.5 MG/1
0.5 TABLET ORAL NIGHTLY PRN
COMMUNITY

## 2021-02-04 RX ORDER — BUPRENORPHINE HYDROCHLORIDE AND NALOXONE HYDROCHLORIDE DIHYDRATE 8; 2 MG/1; MG/1
1 TABLET SUBLINGUAL 3 TIMES DAILY
Status: DISCONTINUED | OUTPATIENT
Start: 2021-02-04 | End: 2021-02-06 | Stop reason: HOSPADM

## 2021-02-04 RX ORDER — PANTOPRAZOLE SODIUM 40 MG/1
40 TABLET, DELAYED RELEASE ORAL DAILY
COMMUNITY

## 2021-02-04 RX ORDER — OLANZAPINE 10 MG/1
40 TABLET ORAL NIGHTLY
Status: DISCONTINUED | OUTPATIENT
Start: 2021-02-04 | End: 2021-02-06 | Stop reason: HOSPADM

## 2021-02-04 RX ORDER — ZOLPIDEM TARTRATE 5 MG/1
5 TABLET ORAL NIGHTLY
Status: DISCONTINUED | OUTPATIENT
Start: 2021-02-04 | End: 2021-02-06 | Stop reason: HOSPADM

## 2021-02-04 RX ORDER — PRAMIPEXOLE DIHYDROCHLORIDE 0.5 MG/1
0.5 TABLET ORAL NIGHTLY
Status: DISCONTINUED | OUTPATIENT
Start: 2021-02-04 | End: 2021-02-06 | Stop reason: HOSPADM

## 2021-02-04 RX ORDER — PANTOPRAZOLE SODIUM 40 MG/1
40 TABLET, DELAYED RELEASE ORAL DAILY
Status: DISCONTINUED | OUTPATIENT
Start: 2021-02-05 | End: 2021-02-06 | Stop reason: HOSPADM

## 2021-02-04 RX ORDER — DICYCLOMINE HCL 20 MG
20 TABLET ORAL 3 TIMES DAILY
COMMUNITY

## 2021-02-04 RX ORDER — SODIUM CHLORIDE 0.9 % (FLUSH) 0.9 %
10 SYRINGE (ML) INJECTION AS NEEDED
Status: DISCONTINUED | OUTPATIENT
Start: 2021-02-04 | End: 2021-02-06 | Stop reason: HOSPADM

## 2021-02-04 RX ORDER — SODIUM CHLORIDE 0.9 % (FLUSH) 0.9 %
10 SYRINGE (ML) INJECTION EVERY 12 HOURS SCHEDULED
Status: DISCONTINUED | OUTPATIENT
Start: 2021-02-04 | End: 2021-02-06 | Stop reason: HOSPADM

## 2021-02-04 RX ORDER — MELATONIN
1000 DAILY
Status: DISCONTINUED | OUTPATIENT
Start: 2021-02-05 | End: 2021-02-06 | Stop reason: HOSPADM

## 2021-02-04 RX ORDER — POTASSIUM CHLORIDE 20 MEQ/1
40 TABLET, EXTENDED RELEASE ORAL ONCE
Status: COMPLETED | OUTPATIENT
Start: 2021-02-04 | End: 2021-02-04

## 2021-02-04 RX ADMIN — POTASSIUM CHLORIDE 40 MEQ: 1500 TABLET, EXTENDED RELEASE ORAL at 13:17

## 2021-02-04 RX ADMIN — ZOLPIDEM TARTRATE 5 MG: 5 TABLET ORAL at 20:03

## 2021-02-04 RX ADMIN — BUSPIRONE HYDROCHLORIDE 30 MG: 15 TABLET ORAL at 20:04

## 2021-02-04 RX ADMIN — POTASSIUM CHLORIDE 10 MEQ: 7.46 INJECTION, SOLUTION INTRAVENOUS at 20:05

## 2021-02-04 RX ADMIN — BUPRENORPHINE AND NALOXONE 1 TABLET: 8; 2 TABLET SUBLINGUAL at 15:54

## 2021-02-04 RX ADMIN — PRAMIPEXOLE DIHYDROCHLORIDE 0.5 MG: 0.5 TABLET ORAL at 20:03

## 2021-02-04 RX ADMIN — POTASSIUM CHLORIDE 10 MEQ: 7.46 INJECTION, SOLUTION INTRAVENOUS at 13:04

## 2021-02-04 RX ADMIN — BUPRENORPHINE AND NALOXONE 1 TABLET: 8; 2 TABLET SUBLINGUAL at 20:03

## 2021-02-04 RX ADMIN — POTASSIUM CHLORIDE 10 MEQ: 7.46 INJECTION, SOLUTION INTRAVENOUS at 15:54

## 2021-02-04 RX ADMIN — POTASSIUM CHLORIDE 10 MEQ: 7.46 INJECTION, SOLUTION INTRAVENOUS at 14:23

## 2021-02-04 RX ADMIN — Medication 10 ML: at 20:05

## 2021-02-04 RX ADMIN — POTASSIUM CHLORIDE 10 MEQ: 7.46 INJECTION, SOLUTION INTRAVENOUS at 16:57

## 2021-02-04 RX ADMIN — SODIUM CHLORIDE 1000 ML: 9 INJECTION, SOLUTION INTRAVENOUS at 11:13

## 2021-02-04 RX ADMIN — OLANZAPINE 40 MG: 10 TABLET, FILM COATED ORAL at 20:03

## 2021-02-04 RX ADMIN — POTASSIUM CHLORIDE 10 MEQ: 7.46 INJECTION, SOLUTION INTRAVENOUS at 11:13

## 2021-02-04 RX ADMIN — ASPIRIN 324 MG: 81 TABLET, CHEWABLE ORAL at 10:05

## 2021-02-04 NOTE — ED PROVIDER NOTES
"Dominick Mckeon is a 45-year-old cachectic female who comes in stating she has had a 20 pound weight loss over the last month or 2.  She states that she has swelling in her lower legs and some mild shortness of breath and weakness she states she was seen in urgent care yesterday and advised to come to the emergency room for evaluation.  She states she has generalized pain that is constant she rates it a 3/10 states that waxes and wanes in intensity          Review of Systems   Constitutional: Negative for chills, fatigue and fever.        Cachectic   HENT: Negative for congestion, tinnitus and trouble swallowing.    Eyes: Negative for photophobia, discharge and redness.   Respiratory: Positive for shortness of breath. Negative for cough.    Cardiovascular: Positive for leg swelling. Negative for chest pain and palpitations.        Bilateral lower leg swelling   Gastrointestinal: Negative for abdominal pain, diarrhea, nausea and vomiting.   Genitourinary: Negative for dysuria, frequency and urgency.   Musculoskeletal: Negative for back pain, joint swelling and myalgias.   Skin: Negative for rash.   Neurological: Negative for dizziness and headaches.   Psychiatric/Behavioral: Negative for confusion.   All other systems reviewed and are negative.      Past Medical History:   Diagnosis Date   • ADHD    • Anxiety    • Bipolar 1 disorder (CMS/HCC)    • COPD (chronic obstructive pulmonary disease) (CMS/HCC)    • Depression    • Hep C w/o coma, chronic (CMS/HCC)    • PTSD (post-traumatic stress disorder)    • Substance abuse (CMS/HCC)    • Vitamin D deficiency        Allergies   Allergen Reactions   • Erythromycin Base Hives   • Lithium Other (See Comments)     States lithium caused \"all of her organs to shut down\"       Past Surgical History:   Procedure Laterality Date   •  SECTION     • CHOLECYSTECTOMY     • COLONOSCOPY N/A 3/4/2020    Procedure: COLONOSCOPY;  Surgeon: Parker Lovell MD;  Location: [facility]" [facility];  Service: Gastroenterology;  Laterality: N/A;  Post: aborted colon   • COLONOSCOPY N/A 3/5/2020    Procedure: COLONOSCOPY;  Surgeon: Parker Lovell MD;  Location: [facility] ENDOSCOPY;  Service: Gastroenterology;  Laterality: N/A;  poor prep   • DILATATION AND CURETTAGE      x2   • ENDOSCOPY N/A 3/4/2020    Procedure: ESOPHAGOGASTRODUODENOSCOPY with biopsy X2;  Surgeon: Parker Lovell MD;  Location: [facility] ENDOSCOPY;  Service: Gastroenterology;  Laterality: N/A;  Post: flattened gastric folds, GERD   • TONSILLECTOMY         Family History   Problem Relation Age of Onset   • Heart disease Mother    • Heart disease Maternal Grandmother    • Heart disease Maternal Grandfather        Social History     Socioeconomic History   • Marital status: Single     Spouse name: Not on file   • Number of children: Not on file   • Years of education: Not on file   • Highest education level: Not on file   Tobacco Use   • Smoking status: Current Every Day Smoker     Packs/day: 1.00     Types: Cigarettes   Substance and Sexual Activity   • Alcohol use: No     Frequency: Never   • Drug use: No   • Sexual activity: Defer   Social History Narrative    Lives with mother and sons            Objective   Physical Exam  Vitals signs reviewed.   Constitutional:       Appearance: She is well-developed.      Comments: Cachectic extremely   HENT:      Head: Normocephalic and atraumatic.      Mouth/Throat:      Mouth: Mucous membranes are moist.      Pharynx: Oropharynx is clear.   Eyes:      Conjunctiva/sclera: Conjunctivae normal.      Pupils: Pupils are equal, round, and reactive to light.   Neck:      Musculoskeletal: Normal range of motion and neck supple.   Cardiovascular:      Rate and Rhythm: Normal rate and regular rhythm.      Heart sounds: Normal heart sounds.   Pulmonary:      Effort: Pulmonary effort is normal. No respiratory distress.      Breath sounds: Normal breath sounds. No decreased breath sounds or  "wheezing.   Abdominal:      General: Bowel sounds are normal. There is no distension.      Palpations: Abdomen is soft. There is no mass.      Tenderness: There is no abdominal tenderness. There is no guarding or rebound.   Musculoskeletal: Normal range of motion.         General: No deformity.      Right lower leg: She exhibits no tenderness. Edema present.      Left lower leg: She exhibits no tenderness. Edema present.   Skin:     General: Skin is warm and dry.      Capillary Refill: Capillary refill takes less than 2 seconds.   Neurological:      General: No focal deficit present.      Mental Status: She is alert and oriented to person, place, and time.      GCS: GCS eye subscore is 4. GCS verbal subscore is 5. GCS motor subscore is 6.      Cranial Nerves: No cranial nerve deficit.      Sensory: No sensory deficit.      Deep Tendon Reflexes: Reflexes normal.   Psychiatric:         Mood and Affect: Mood normal.         Behavior: Behavior normal.         Procedures       EKG shows sinus rhythm with a rate of 86 no ectopy no ST elevation reviewed by myself read by the ER physician    ED Course  ED Course as of Feb 04 1252   Thu Feb 04, 2021   1251 Was discussed with Dr. Gunterde will be admitted for potassium replacement.    [KW]      ED Course User Index  [KW] Lacy Olguin, APRN    /44   Pulse 85   Temp 97.5 °F (36.4 °C) (Oral)   Resp 14   Ht 162.6 cm (64\")   Wt 36.5 kg (80 lb 7.5 oz)   SpO2 100%   Breastfeeding No   BMI 13.81 kg/m²   Labs Reviewed   COMPREHENSIVE METABOLIC PANEL - Abnormal; Notable for the following components:       Result Value    Creatinine 1.28 (*)     Potassium 2.8 (*)     Chloride 116 (*)     CO2 14.0 (*)     Total Protein 5.0 (*)     Albumin 2.80 (*)     Alkaline Phosphatase 139 (*)     eGFR Non  Amer 46 (*)     All other components within normal limits    Narrative:     GFR Normal >60  Chronic Kidney Disease <60  Kidney Failure <15     TSH - Abnormal; Notable " for the following components:    TSH 4.760 (*)     All other components within normal limits   CBC WITH AUTO DIFFERENTIAL - Abnormal; Notable for the following components:    RBC 3.24 (*)     Hemoglobin 10.0 (*)     Hematocrit 29.3 (*)     RDW 15.6 (*)     All other components within normal limits   TROPONIN (IN-HOUSE) - Normal    Narrative:     Troponin T Reference Range:  <= 0.03 ng/mL-   Negative for AMI  >0.03 ng/mL-     Abnormal for myocardial necrosis.  Clinicians would have to utilize clinical acumen, EKG, Troponin and serial changes to determine if it is an Acute Myocardial Infarction or myocardial injury due to an underlying chronic condition.       Results may be falsely decreased if patient taking Biotin.     PROTIME-INR - Normal   MAGNESIUM - Normal   RAINBOW DRAW    Narrative:     The following orders were created for panel order Nelson Draw.  Procedure                               Abnormality         Status                     ---------                               -----------         ------                     Light Blue Top[[id number]]                                   Final result               Green Top (Gel)[[id number]]                                  Final result               Lavender Top[[id number]]                                     Final result                 Please view results for these tests on the individual orders.   TROPONIN (IN-HOUSE)   LIGHT BLUE TOP   GREEN TOP   LAVENDER TOP   CBC AND DIFFERENTIAL    Narrative:     The following orders were created for panel order CBC & Differential.  Procedure                               Abnormality         Status                     ---------                               -----------         ------                     CBC Auto Differential[[id number]]        Abnormal            Final result                 Please view results for these tests on the individual orders.   EXTRA TUBES    Narrative:     The following orders were created for panel  order Extra Tubes.  Procedure                               Abnormality         Status                     ---------                               -----------         ------                     Green Top (No Gel)[[id number]]                               In process                   Please view results for these tests on the individual orders.   GREEN TOP NO GEL     Medications   sodium chloride 0.9 % flush 10 mL (has no administration in time range)   potassium chloride 10 mEq in 100 mL IVPB (10 mEq Intravenous New Bag 2/4/21 1113)     And   potassium chloride 10 mEq in 100 mL IVPB (has no administration in time range)     And   potassium chloride 10 mEq in 100 mL IVPB (has no administration in time range)     And   potassium chloride 10 mEq in 100 mL IVPB (has no administration in time range)     And   potassium chloride 10 mEq in 100 mL IVPB (has no administration in time range)     And   potassium chloride 10 mEq in 100 mL IVPB (has no administration in time range)   potassium chloride (K-DUR,KLOR-CON) CR tablet 40 mEq (has no administration in time range)   lactated ringers with KCl 40 mEq/L infusion (has no administration in time range)   aspirin chewable tablet 324 mg (324 mg Oral Given 2/4/21 1005)   sodium chloride 0.9 % bolus 1,000 mL (1,000 mL Intravenous New Bag 2/4/21 1113)     Xr Chest 1 View    Result Date: 2/4/2021  No acute cardiopulmonary abnormality. Emphysema and changes of prior granulomatous disease exposure.  Electronically Signed By-Judith Lindsey MD On:2/4/2021 10:20 AM This report was finalized on [id number] by  Judith Lindsey MD.                                           MDM  Number of Diagnoses or Management Options  Cachectic (CMS/HCC):   Chronic anemia:   Dehydration:   Hypokalemia:   Diagnosis management comments: Patient had IV established and blood work was obtained.  Patient was found to have a potassium of 2.8 CO2 of 14-she was started on IV potassium and normal saline at 125 an hour  and patient was told of the need for admission.  She was agreeable this plan of care.  Chart review showed that the patient has chronic anemia and the hemoglobin today of 10 is stable-    Patient discussed with the hospitalist and will be admitted for potassium replacement       Amount and/or Complexity of Data Reviewed  Clinical lab tests: reviewed  Tests in the radiology section of CPT®: reviewed  Tests in the medicine section of CPT®: reviewed    Patient Progress  Patient progress: stable      Final diagnoses:   Hypokalemia   Chronic anemia   Cachectic (CMS/HCC)   Dehydration            Lacy Olguin, APRN  02/04/21 0050

## 2021-02-04 NOTE — PLAN OF CARE
Goal Outcome Evaluation:  Plan of Care Reviewed With: patient  Progress: no change  Outcome Summary: Pt arrived to unit around 1500, resting comfortably in bed. K+ being replaced, will continue to monitor.

## 2021-02-04 NOTE — ED NOTES
Reports soa with no cough and bilateral leg swelling for 1 week.     Roxanne Cardenas, RN  02/04/21 1007

## 2021-02-04 NOTE — H&P
[facility] Medicine Services      Patient Name: Elizabeth Gómez  : 1975  MRN: [id number]  Primary Care Physician: Jose, No Known  Date of admission: 2021    Patient Care Team:  Provider, No Known as PCP - General          Subjective   History Present Illness     Chief Complaint:   Chief Complaint   Patient presents with   • Shortness of Breath     Chronic weight loss and generalized weakness    Ms. Gómez is a 45 y.o.  presents to [facility] complaining of chronic weight loss and generalized weakness         History of Present Illness    Elizabeth  is a 45-year-old cachectic female with  Chronic  Weight  Loss  Hx ,  Hx  of Severe protein malnutrition/Weight loss >  40lbs , Bipolar disorder, depression, PTSD, H/o Hepatitis C who comes in stating she has had a 20 pound weight loss over the last month or 2.  She states that she has swelling in her lower legs and some mild shortness of breath and weakness she states she was seen in urgent care yesterday and advised to come to the emergency room for evaluation.  She states she has generalized pain that is constant she rates it a 3/10 states that waxes and wanes in intensity       She denies any chest pains, no fevers no night sweats, no nausea no vomiting no diarrhea.  Review of records indicate that patient has had similar complaints and presented in the emergency room 3/12/2020.      She was evaluated in the emergency room and referred for admission for hypokalemia.     Vitals on admission oxygen saturation 100% room air blood pressure 94/61 respiration 14 pulse 97 temperature 97.5      Review of Systems   Constitution: Positive for malaise/fatigue and weight loss.   HENT: Negative.    Eyes: Negative.    Cardiovascular: Negative.    Respiratory: Negative.    Endocrine: Negative.    Skin: Negative.    Musculoskeletal: Positive for muscle weakness.   Gastrointestinal: Negative.    Genitourinary: Negative.    Neurological:  Negative.    Psychiatric/Behavioral: Negative.            Personal History     Past Medical History:   Past Medical History:   Diagnosis Date   • ADHD    • Anxiety    • Bipolar 1 disorder (CMS/[facility])    • COPD (chronic obstructive pulmonary disease) (CMS/[facility])    • Depression    • Hep C w/o coma, chronic (CMS/[facility])    • PTSD (post-traumatic stress disorder)    • Substance abuse (CMS/[facility])    • Vitamin D deficiency        Surgical History:      Past Surgical History:   Procedure Laterality Date   •  SECTION     • CHOLECYSTECTOMY     • COLONOSCOPY N/A 3/4/2020    Procedure: COLONOSCOPY;  Surgeon: Parker Lovell MD;  Location: [facility] ENDOSCOPY;  Service: Gastroenterology;  Laterality: N/A;  Post: aborted colon   • COLONOSCOPY N/A 3/5/2020    Procedure: COLONOSCOPY;  Surgeon: Parker Lovell MD;  Location: [facility] ENDOSCOPY;  Service: Gastroenterology;  Laterality: N/A;  poor prep   • DILATATION AND CURETTAGE      x2   • ENDOSCOPY N/A 3/4/2020    Procedure: ESOPHAGOGASTRODUODENOSCOPY with biopsy X2;  Surgeon: Parker Lovell MD;  Location: [facility] ENDOSCOPY;  Service: Gastroenterology;  Laterality: N/A;  Post: flattened gastric folds, GERD   • TONSILLECTOMY             Family History: family history includes Heart disease in her maternal grandfather, maternal grandmother, and mother. Otherwise pertinent FHx was reviewed and unremarkable.     Social History:  reports that she has been smoking cigarettes. She has been smoking about 1.00 pack per day. She does not have any smokeless tobacco history on file. She reports that she does not drink alcohol or use drugs.      Medications:  Prior to Admission medications    Medication Sig Start Date End Date Taking? Authorizing Provider   buprenorphine-naloxone (SUBOXONE) 8-2 MG per SL tablet Place 1 tablet under the tongue 3 (Three) Times a Day.   Yes Provider, MD Laura   busPIRone (BUSPAR) 30 MG tablet Take 30 mg by mouth 2 (Two) Times a Day.   Yes  "Laura Garcia MD   cholecalciferol (VITAMIN D3) 10 MCG (400 UNIT) tablet Take 3,000 Units by mouth Daily.   Yes Laura Garcia MD   dicyclomine (BENTYL) 20 MG tablet Take 20 mg by mouth 3 (Three) Times a Day.   Yes Laura Garcia MD   docusate sodium (COLACE) 100 MG capsule Take 100 mg by mouth 2 (Two) Times a Day.   Yes Laura Garcia MD   lamoTRIgine (LAMICTAL) 100 MG tablet Take 100 mg by mouth Daily. 2/8/18  Yes Laura Garcia MD   OLANZapine (zyPREXA) 10 MG tablet Take 40 mg by mouth Every Night.   Yes Laura Garcia MD   pantoprazole (PROTONIX) 40 MG EC tablet Take 40 mg by mouth Daily.   Yes Laura Garcia MD   pramipexole (MIRAPEX) 0.5 MG tablet Take 0.5 mg by mouth At Night As Needed.   Yes Laura Garcia MD   ramelteon (ROZEREM) 8 MG tablet Take 8 mg by mouth Every Night.   Yes Laura Garcia MD   sodium bicarbonate 650 MG tablet Take 650 mg by mouth 3 (Three) Times a Day.   Yes Laura Garcia MD   venlafaxine XR (EFFEXOR-XR) 150 MG 24 hr capsule Take 225 mg by mouth every night at bedtime.   Yes Laura Garcia MD   bisacodyl (DULCOLAX) 5 MG EC tablet Take 2 tablets by mouth Daily As Needed for Constipation. 3/6/20 2/4/21  Caroline Tidwell MD   fluticasone (FLONASE) 50 MCG/ACT nasal spray 2 sprays into the nostril(s) as directed by provider Daily.  2/4/21  Laura Garcia MD   polyethylene glycol (MIRALAX) packet Take 17 g by mouth Daily As Needed (constipation).  2/4/21  Laura Garcia MD   umeclidinium-vilanterol (ANORO ELLIPTA) 62.5-25 MCG/INH aerosol powder  inhaler Inhale 1 puff Daily.  2/4/21  Laura Garcia MD       Allergies:    Allergies   Allergen Reactions   • Erythromycin Base Hives   • Lithium Other (See Comments)     States lithium caused \"all of her organs to shut down\"       Objective   Objective     Vital Signs  Temp:  [97.5 °F (36.4 °C)] 97.5 °F (36.4 °C)  Heart Rate:  [81-97] 85  Resp:  [14-18] " 18  BP: ()/(44-69) 92/64  SpO2:  [100 %] 100 %  on   ;   Device (Oxygen Therapy): room air  Body mass index is 14.91 kg/m².    Physical Exam  Constitutional:       Appearance: She is well-developed.      Comments: Cachetic  Female ,  Temporal wasting    HENT:      Head: Normocephalic and atraumatic.      Mouth/Throat:      Mouth: Mucous membranes are moist.   Eyes:      Conjunctiva/sclera: Conjunctivae normal.      Pupils: Pupils are equal, round, and reactive to light.   Neck:      Musculoskeletal: Normal range of motion and neck supple.   Cardiovascular:      Rate and Rhythm: Normal rate and regular rhythm.      Pulses: Normal pulses.      Heart sounds: Normal heart sounds.   Pulmonary:      Effort: Pulmonary effort is normal.      Breath sounds: Normal breath sounds.   Abdominal:      General: Abdomen is flat. Bowel sounds are normal.      Palpations: Abdomen is soft.   Musculoskeletal: Normal range of motion.         General: Swelling present.      Comments: Muscular  Wasting    Skin:     General: Skin is warm and dry.      Capillary Refill: Capillary refill takes less than 2 seconds.   Neurological:      General: No focal deficit present.      Mental Status: She is alert and oriented to person, place, and time. Mental status is at baseline.   Psychiatric:         Mood and Affect: Mood normal.         Results Review:  I have personally reviewed most recent lab results     Results from last 7 days   Lab Units 02/04/21  0956   WBC 10*3/mm3 9.00   HEMOGLOBIN g/dL 10.0*   HEMATOCRIT % 29.3*   PLATELETS 10*3/mm3 314   INR  1.00     Results from last 7 days   Lab Units 02/04/21  1406 02/04/21  0956   SODIUM mmol/L  --  142   POTASSIUM mmol/L  --  2.8*   CHLORIDE mmol/L  --  116*   CO2 mmol/L  --  14.0*   BUN mg/dL  --  15   CREATININE mg/dL  --  1.28*   GLUCOSE mg/dL  --  84   CALCIUM mg/dL  --  8.7   ALT (SGPT) U/L  --  22   AST (SGOT) U/L  --  22   TROPONIN T ng/mL <0.010 0.023     Estimated Creatinine  Clearance: 35.2 mL/min (A) (by C-G formula based on SCr of 1.28 mg/dL (H)).  Brief Urine Lab Results  (Last result in the past 365 days)      Color   Clarity   Blood   Leuk Est   Nitrite   Protein   CREAT   Urine HCG        05/28/20 1028             47.7       05/28/20 1028 Yellow Clear Negative Negative Negative 30 mg/dL (1+)               Microbiology Results (last 10 days)     ** No results found for the last 240 hours. **          ECG/EMG Results (most recent)     Procedure Component Value Units Date/Time    ECG 12 Lead [[id number]] Collected: 02/04/21 0945     Updated: 02/04/21 0947     QT Interval 385 ms     Narrative:      HEART RATE= 86  bpm  RR Interval= 696  ms  NH Interval= 152  ms  P Horizontal Axis= 31  deg  P Front Axis= 75  deg  QRSD Interval= 94  ms  QT Interval= 385  ms  QRS Axis= 54  deg  T Wave Axis= -89  deg  - ABNORMAL ECG -  Sinus rhythm  Repol abnrm suggests ischemia, diffuse leads  Electronically Signed By:   Date and Time of Study: 2021-02-04 09:45:37          Results for orders placed during the hospital encounter of 08/23/19   Duplex Carotid Ultrasound CAR    Narrative · Proximal right internal carotid artery mild stenosis.  · Proximal left internal carotid artery mild stenosis.          Results for orders placed during the hospital encounter of 08/23/19   Adult Transthoracic Echo Complete W/ Cont if Necessary Per Protocol    Narrative · Estimated EF = 60%.  · Left ventricular systolic function is normal.       Indications  Syncope    Technically satisfactory study.  Mitral valve is structurally normal.  Tricuspid valve is structurally normal.  Aortic valve is structurally normal.  Pulmonic valve could not be well visualized.  No evidence for mitral tricuspid or aortic regurgitation is seen by   Doppler study.  Left atrium is normal in size.  Right atrium is normal in size.  Left ventricle is normal in size and contractility with ejection fraction   of 60%.  Right ventricle is normal in  size.  Atrial septum is intact.  Aorta is normal.  No pericardial effusion or intracardiac thrombus is seen.    Impression  Structurally and functionally normal cardiac valves.  Normal echo Doppler study.  Left ventricular ejection fraction is 60%.           Xr Chest 1 View    Result Date: 2/4/2021  No acute cardiopulmonary abnormality. Emphysema and changes of prior granulomatous disease exposure.  Electronically Signed By-Judith Lindsey MD On:2/4/2021 10:20 AM This report was finalized on [id number] by  Judith Lindsey MD.        Estimated Creatinine Clearance: 35.2 mL/min (A) (by C-G formula based on SCr of 1.28 mg/dL (H)).    Assessment/Plan   Assessment/Plan       Active Hospital Problems    Diagnosis  POA   • Hypokalemia [E87.6]  Yes      Resolved Hospital Problems   No resolved problems to display.       Elizabeth  is a 45-year-old cachectic female with  Chronic  Weight  Loss  Hx ,  Hx  of Severe protein malnutrition/Weight loss >  40lbs , Bipolar disorder, depression, PTSD, H/o Hepatitis C who comes in stating she has had a 20 pound weight loss over the last month or 2.  She states that she has swelling in her lower legs and some mild shortness of breath and weakness she states she was seen in urgent care yesterday and advised to come to the emergency room for evaluation.  She states she has generalized pain that is constant she rates it a 3/10 states that waxes and wanes in intensity       She denies any chest pains, no fevers no night sweats, no nausea no vomiting no diarrhea.  Review of records indicate that patient has had similar complaints and presented in the emergency room 3/12/2020.      She was evaluated in the emergency room and referred for admission for hypokalemia.     Vitals on admission oxygen saturation 100% room air blood pressure 94/61 respiration 14 pulse 97 temperature 97.5      Assessment      Hypokalemia secondary to poor intake  Potassium 2.8 on admission  Admit patient to  observation  Anticipate 1 midnight stay  Repeat BMP in the morning  Supplement potassium and monitor  BMP later tonight  Magnesium 2.1     Severe protein malnutrition/Weight loss  This is chronic ongoing problem since last year  BMI 14.91  Patient reports that she eats  She has history of bipolar disorder we will obtain psych consult  Obtain nutritional consult  TSH 4.7    CKD stage III A  Monitor kidney function while in the hospital  BMP in the morning  Nephrotoxic meds     Bipolar disorder, depression, PTSD  cont home lamictal, zyprexa, effexor  Psych follow-up     H/o Hepatitis C  Will need GI follow-up as outpatient    Monocytic normochromic anemia  Hemoglobin 10 on admission  We will monitor closely while she is in the hospital               VTE Prophylaxis -   Mechanical Order History:     None      Pharmalogical Order History:      Ordered     Dose Route Frequency Stop    02/04/21 1325  enoxaparin (LOVENOX) syringe 40 mg      40 mg SC Every 24 Hours --                CODE STATUS:    Code Status and Medical Interventions:   Ordered at: 02/04/21 1325     Code Status:    CPR     Medical Interventions (Level of Support Prior to Arrest):    Full       This patient has been examined wearing appropriate Personal Protective Equipment     I discussed the patient's findings and my recommendations with patient.      Signature:angelika Ngoyd Hospitalist Team

## 2021-02-04 NOTE — ED NOTES
Pt resting in bed on phone. No needs voiced by pt at this time.     Roxanne Cardenas RN  02/04/21 8465

## 2021-02-05 ENCOUNTER — ON CAMPUS - OUTPATIENT (AMBULATORY)
Dept: URBAN - METROPOLITAN AREA HOSPITAL 85 | Facility: HOSPITAL | Age: 46
End: 2021-02-05
Payer: COMMERCIAL

## 2021-02-05 DIAGNOSIS — E87.6 HYPOKALEMIA: ICD-10-CM

## 2021-02-05 DIAGNOSIS — R13.10 DYSPHAGIA, UNSPECIFIED: ICD-10-CM

## 2021-02-05 DIAGNOSIS — B18.2 CHRONIC VIRAL HEPATITIS C: ICD-10-CM

## 2021-02-05 LAB
ANION GAP SERPL CALCULATED.3IONS-SCNC: 5 MMOL/L (ref 5–15)
BASOPHILS # BLD AUTO: 0.1 10*3/MM3 (ref 0–0.2)
BASOPHILS NFR BLD AUTO: 0.5 % (ref 0–1.5)
BUN SERPL-MCNC: 14 MG/DL (ref 6–20)
BUN/CREAT SERPL: 10.9 (ref 7–25)
CALCIUM SPEC-SCNC: 7.5 MG/DL (ref 8.6–10.5)
CHLORIDE SERPL-SCNC: 124 MMOL/L (ref 98–107)
CO2 SERPL-SCNC: 13 MMOL/L (ref 22–29)
CREAT SERPL-MCNC: 1.28 MG/DL (ref 0.57–1)
DEPRECATED RDW RBC AUTO: 50.3 FL (ref 37–54)
EOSINOPHIL # BLD AUTO: 0.1 10*3/MM3 (ref 0–0.4)
EOSINOPHIL NFR BLD AUTO: 0.9 % (ref 0.3–6.2)
ERYTHROCYTE [DISTWIDTH] IN BLOOD BY AUTOMATED COUNT: 15.8 % (ref 12.3–15.4)
GFR SERPL CREATININE-BSD FRML MDRD: 46 ML/MIN/1.73
GLUCOSE SERPL-MCNC: 95 MG/DL (ref 65–99)
HCT VFR BLD AUTO: 26.3 % (ref 34–46.6)
HGB BLD-MCNC: 8.5 G/DL (ref 12–15.9)
LYMPHOCYTES # BLD AUTO: 4.3 10*3/MM3 (ref 0.7–3.1)
LYMPHOCYTES NFR BLD AUTO: 32.9 % (ref 19.6–45.3)
MCH RBC QN AUTO: 29.1 PG (ref 26.6–33)
MCHC RBC AUTO-ENTMCNC: 32.3 G/DL (ref 31.5–35.7)
MCV RBC AUTO: 90.1 FL (ref 79–97)
MONOCYTES # BLD AUTO: 0.5 10*3/MM3 (ref 0.1–0.9)
MONOCYTES NFR BLD AUTO: 3.9 % (ref 5–12)
NEUTROPHILS NFR BLD AUTO: 61.8 % (ref 42.7–76)
NEUTROPHILS NFR BLD AUTO: 8 10*3/MM3 (ref 1.7–7)
NRBC BLD AUTO-RTO: 0.2 /100 WBC (ref 0–0.2)
PLATELET # BLD AUTO: 274 10*3/MM3 (ref 140–450)
PMV BLD AUTO: 8.4 FL (ref 6–12)
POTASSIUM SERPL-SCNC: 3 MMOL/L (ref 3.5–5.2)
POTASSIUM SERPL-SCNC: 3.1 MMOL/L (ref 3.5–5.2)
POTASSIUM SERPL-SCNC: 3.5 MMOL/L (ref 3.5–5.2)
RBC # BLD AUTO: 2.92 10*6/MM3 (ref 3.77–5.28)
SODIUM SERPL-SCNC: 142 MMOL/L (ref 136–145)
WBC # BLD AUTO: 13 10*3/MM3 (ref 3.4–10.8)

## 2021-02-05 PROCEDURE — 96372 THER/PROPH/DIAG INJ SC/IM: CPT

## 2021-02-05 PROCEDURE — 99213 OFFICE O/P EST LOW 20 MIN: CPT | Performed by: PSYCHIATRY & NEUROLOGY

## 2021-02-05 PROCEDURE — 92610 EVALUATE SWALLOWING FUNCTION: CPT

## 2021-02-05 PROCEDURE — G0378 HOSPITAL OBSERVATION PER HR: HCPCS

## 2021-02-05 PROCEDURE — 85025 COMPLETE CBC W/AUTO DIFF WBC: CPT | Performed by: INTERNAL MEDICINE

## 2021-02-05 PROCEDURE — 96367 TX/PROPH/DG ADDL SEQ IV INF: CPT

## 2021-02-05 PROCEDURE — 99212 OFFICE O/P EST SF 10 MIN: CPT | Performed by: INTERNAL MEDICINE

## 2021-02-05 PROCEDURE — 99225 PR SBSQ OBSERVATION CARE/DAY 25 MINUTES: CPT | Performed by: INTERNAL MEDICINE

## 2021-02-05 PROCEDURE — 25010000002 ENOXAPARIN PER 10 MG: Performed by: INTERNAL MEDICINE

## 2021-02-05 PROCEDURE — 25010000002 CALCIUM GLUCONATE 2-0.675 GM/100ML-% SOLUTION: Performed by: INTERNAL MEDICINE

## 2021-02-05 PROCEDURE — 80048 BASIC METABOLIC PNL TOTAL CA: CPT | Performed by: INTERNAL MEDICINE

## 2021-02-05 PROCEDURE — 84132 ASSAY OF SERUM POTASSIUM: CPT | Performed by: INTERNAL MEDICINE

## 2021-02-05 RX ORDER — POTASSIUM CHLORIDE 7.45 MG/ML
10 INJECTION INTRAVENOUS
Status: DISCONTINUED | OUTPATIENT
Start: 2021-02-05 | End: 2021-02-06 | Stop reason: HOSPADM

## 2021-02-05 RX ORDER — POTASSIUM CHLORIDE 20 MEQ/1
40 TABLET, EXTENDED RELEASE ORAL AS NEEDED
Status: DISCONTINUED | OUTPATIENT
Start: 2021-02-05 | End: 2021-02-06 | Stop reason: HOSPADM

## 2021-02-05 RX ORDER — POTASSIUM CHLORIDE 1.5 G/1.77G
40 POWDER, FOR SOLUTION ORAL AS NEEDED
Status: DISCONTINUED | OUTPATIENT
Start: 2021-02-05 | End: 2021-02-06 | Stop reason: HOSPADM

## 2021-02-05 RX ORDER — MAGNESIUM SULFATE 1 G/100ML
1 INJECTION INTRAVENOUS AS NEEDED
Status: DISCONTINUED | OUTPATIENT
Start: 2021-02-05 | End: 2021-02-06 | Stop reason: HOSPADM

## 2021-02-05 RX ORDER — MAGNESIUM SULFATE HEPTAHYDRATE 40 MG/ML
2 INJECTION, SOLUTION INTRAVENOUS AS NEEDED
Status: DISCONTINUED | OUTPATIENT
Start: 2021-02-05 | End: 2021-02-06 | Stop reason: HOSPADM

## 2021-02-05 RX ORDER — CALCIUM GLUCONATE 20 MG/ML
2 INJECTION, SOLUTION INTRAVENOUS ONCE
Status: COMPLETED | OUTPATIENT
Start: 2021-02-05 | End: 2021-02-05

## 2021-02-05 RX ADMIN — BUSPIRONE HYDROCHLORIDE 30 MG: 15 TABLET ORAL at 20:23

## 2021-02-05 RX ADMIN — POTASSIUM CHLORIDE 40 MEQ: 1500 TABLET, EXTENDED RELEASE ORAL at 16:40

## 2021-02-05 RX ADMIN — Medication 10 ML: at 09:23

## 2021-02-05 RX ADMIN — PRAMIPEXOLE DIHYDROCHLORIDE 0.5 MG: 0.5 TABLET ORAL at 20:23

## 2021-02-05 RX ADMIN — ZOLPIDEM TARTRATE 5 MG: 5 TABLET ORAL at 20:23

## 2021-02-05 RX ADMIN — POTASSIUM CHLORIDE 40 MEQ: 1500 TABLET, EXTENDED RELEASE ORAL at 13:22

## 2021-02-05 RX ADMIN — LAMOTRIGINE 100 MG: 100 TABLET ORAL at 09:22

## 2021-02-05 RX ADMIN — BUPRENORPHINE AND NALOXONE 1 TABLET: 8; 2 TABLET SUBLINGUAL at 20:23

## 2021-02-05 RX ADMIN — POTASSIUM CHLORIDE 40 MEQ: 1500 TABLET, EXTENDED RELEASE ORAL at 20:23

## 2021-02-05 RX ADMIN — Medication 1000 UNITS: at 09:22

## 2021-02-05 RX ADMIN — BUPRENORPHINE AND NALOXONE 1 TABLET: 8; 2 TABLET SUBLINGUAL at 09:22

## 2021-02-05 RX ADMIN — ENOXAPARIN SODIUM 40 MG: 40 INJECTION SUBCUTANEOUS at 16:39

## 2021-02-05 RX ADMIN — BUSPIRONE HYDROCHLORIDE 30 MG: 15 TABLET ORAL at 09:22

## 2021-02-05 RX ADMIN — BUPRENORPHINE AND NALOXONE 1 TABLET: 8; 2 TABLET SUBLINGUAL at 16:39

## 2021-02-05 RX ADMIN — CALCIUM GLUCONATE 2 G: 20 INJECTION, SOLUTION INTRAVENOUS at 16:39

## 2021-02-05 RX ADMIN — POTASSIUM CHLORIDE 40 MEQ: 1500 TABLET, EXTENDED RELEASE ORAL at 09:22

## 2021-02-05 RX ADMIN — PANTOPRAZOLE SODIUM 40 MG: 40 TABLET, DELAYED RELEASE ORAL at 09:22

## 2021-02-05 RX ADMIN — Medication 10 ML: at 20:23

## 2021-02-05 RX ADMIN — POTASSIUM CHLORIDE 40 MEQ: 1500 TABLET, EXTENDED RELEASE ORAL at 05:06

## 2021-02-05 RX ADMIN — VENLAFAXINE HYDROCHLORIDE 150 MG: 75 CAPSULE, EXTENDED RELEASE ORAL at 09:22

## 2021-02-05 RX ADMIN — OLANZAPINE 40 MG: 10 TABLET, FILM COATED ORAL at 20:22

## 2021-02-05 NOTE — PROGRESS NOTES
GI CONSULT  NOTE:    Referring Provider:  Arminda    Chief complaint: Chronic dysphagia X 5 years    Subjective .     History of present illness: Elizabeth Gómez is a 45 y.o. female who presents for low potassium of unclear cause. Dr. Valentino of GI has seen her 10/2020 for same:  She had chronic hepatitis C genotype 1a viral load 3.7 million (not approved for [facility] in 2015), history of cholecystectomy, fibromyalgia and chronic pain. She was seen 10/2020 for follow up after finished on epclusa which she finished 2020. She complained of continued constipation and may go week without bm. She was started on linzess. Her bowels are much better on linzess. No abd pain or n/v. She now complains of intermittent dysphagia--outpt EGD with dilation is reasonable as no indication for emergent EGD over weekend. She may go home once her potassium has been corrected tonight.    10/2020 HCV undetectable  2020 HCV G1a VL 3.5M Alk phos 132 Cr 1.25  Mar 2020 CT constipation  7/29/15 ALT 31 o/w CMP normal, , CBC normal, no fibrosis       Endo History:  Mar 2020 EGD gastritis  Mar 2020 colonoscopy suboptimal prep, o/w nl  8/20/15 EGD/colonoscopy (Dr. Valentino) - gastritis (bx bengin), empiric esophageal dilation, (random colon bx negative)    Past Medical History:  Past Medical History:   Diagnosis Date   • ADHD    • Anxiety    • Bipolar 1 disorder (CMS/HCC)    • COPD (chronic obstructive pulmonary disease) (CMS/HCC)    • Depression    • Hep C w/o coma, chronic (CMS/HCC)    • PTSD (post-traumatic stress disorder)    • Substance abuse (CMS/HCC)    • Vitamin D deficiency        Past Surgical History:  Past Surgical History:   Procedure Laterality Date   •  SECTION     • CHOLECYSTECTOMY     • COLONOSCOPY N/A 3/4/2020    Procedure: COLONOSCOPY;  Surgeon: Parker Lovell MD;  Location: [facility] ENDOSCOPY;  Service: Gastroenterology;  Laterality: N/A;  Post: aborted colon   • COLONOSCOPY N/A 3/5/2020    Procedure:  COLONOSCOPY;  Surgeon: Parker Lovell MD;  Location: [facility] ENDOSCOPY;  Service: Gastroenterology;  Laterality: N/A;  poor prep   • DILATATION AND CURETTAGE      x2   • ENDOSCOPY N/A 3/4/2020    Procedure: ESOPHAGOGASTRODUODENOSCOPY with biopsy X2;  Surgeon: Parker Lovell MD;  Location: [facility] ENDOSCOPY;  Service: Gastroenterology;  Laterality: N/A;  Post: flattened gastric folds, GERD   • TONSILLECTOMY         Social History:  Social History     Tobacco Use   • Smoking status: Current Every Day Smoker     Packs/day: 1.00     Types: Cigarettes   Substance Use Topics   • Alcohol use: No     Frequency: Never   • Drug use: No       Family History:  Family History   Problem Relation Age of Onset   • Heart disease Mother    • Heart disease Maternal Grandmother    • Heart disease Maternal Grandfather        Medications:  Medications Prior to Admission   Medication Sig Dispense Refill Last Dose   • buprenorphine-naloxone (SUBOXONE) 8-2 MG per SL tablet Place 1 tablet under the tongue 3 (Three) Times a Day.   2/4/2021 at 0800   • busPIRone (BUSPAR) 30 MG tablet Take 30 mg by mouth 2 (Two) Times a Day.   2/4/2021 at 0800   • cholecalciferol (VITAMIN D3) 10 MCG (400 UNIT) tablet Take 3,000 Units by mouth Daily.   2/4/2021 at 0800   • dicyclomine (BENTYL) 20 MG tablet Take 20 mg by mouth 3 (Three) Times a Day.   2/4/2021 at 0800   • docusate sodium (COLACE) 100 MG capsule Take 100 mg by mouth 2 (Two) Times a Day.   2/4/2021 at 0800   • lamoTRIgine (LAMICTAL) 100 MG tablet Take 100 mg by mouth Daily.   2/4/2021 at 0800   • OLANZapine (zyPREXA) 10 MG tablet Take 40 mg by mouth Every Night.   2/3/2021 at Unknown time   • pantoprazole (PROTONIX) 40 MG EC tablet Take 40 mg by mouth Daily.   2/4/2021 at 0800   • pramipexole (MIRAPEX) 0.5 MG tablet Take 0.5 mg by mouth At Night As Needed.      • ramelteon (ROZEREM) 8 MG tablet Take 8 mg by mouth Every Night.   2/3/2021 at Unknown time   • sodium bicarbonate 650 MG  "tablet Take 650 mg by mouth 3 (Three) Times a Day.   2/4/2021 at 0800   • venlafaxine XR (EFFEXOR-XR) 150 MG 24 hr capsule Take 225 mg by mouth every night at bedtime.   2/3/2021 at Unknown time       Scheduled Meds:buprenorphine-naloxone, 1 tablet, Sublingual, TID  busPIRone, 30 mg, Oral, Q12H  calcium gluconate, 2 g, Intravenous, Once  cholecalciferol, 1,000 Units, Oral, Daily  enoxaparin, 40 mg, Subcutaneous, Q24H  lamoTRIgine, 100 mg, Oral, Daily  OLANZapine, 40 mg, Oral, Nightly  pantoprazole, 40 mg, Oral, Daily  pramipexole, 0.5 mg, Oral, Nightly  sodium chloride, 10 mL, Intravenous, Q12H  venlafaxine XR, 150 mg, Oral, Daily With Breakfast  zolpidem, 5 mg, Oral, Nightly      Continuous Infusions:   PRN Meds:.magnesium sulfate **OR** magnesium sulfate in D5W 1g/100mL (PREMIX)  •  potassium chloride **OR** potassium chloride **OR** potassium chloride  •  sodium chloride  •  sodium chloride    ALLERGIES:  Erythromycin base and Lithium    ROS:  The following systems were reviewed and negative;  Constitution:  No fevers, chills, no unintentional weight loss  Skin: no rash, no jaundice  Eyes:  No blurry vision, no eye pain  HENT:  No change in hearing or smell  Resp:  No dyspnea or cough  CV:  No chest pain or palpitations  :  No dysuria, hematuria  Musculoskeletal:  No leg cramps or arthralgias  Neuro:  No tremor, no numbness  Psych:  No depression or confsuion    Objective     Vital Signs:   Vitals:    02/04/21 1515 02/04/21 2017 02/05/21 0415 02/05/21 1153   BP: 92/64 97/63 98/62 101/60   BP Location: Left arm Right arm Right arm Right arm   Patient Position: Sitting Lying Lying Lying   Pulse: 85 87 97 107   Resp: 18 16 14 16   Temp: 97.5 °F (36.4 °C) 97.9 °F (36.6 °C) 99.1 °F (37.3 °C) 98.3 °F (36.8 °C)   TempSrc: Oral Oral Oral Oral   SpO2: 100% 99% 97% 100%   Weight: 39.4 kg (86 lb 13.8 oz)      Height: 162.6 cm (64\")          Physical Exam: Nondistended, nontender, no hepatosplenomegally                    "                                       Results Review:   I reviewed the patient's labs and imaging.  Lab Results (last 24 hours)     Procedure Component Value Units Date/Time    Potassium [[id number]]  (Abnormal) Collected: 02/05/21 1423    Specimen: Blood Updated: 02/05/21 1503     Potassium 3.0 mmol/L      Comment: Slight hemolysis detected by analyzer. Results may be affected.       Basic Metabolic Panel [[id number]]  (Abnormal) Collected: 02/05/21 0254    Specimen: Blood Updated: 02/05/21 0403     Glucose 95 mg/dL      BUN 14 mg/dL      Creatinine 1.28 mg/dL      Sodium 142 mmol/L      Potassium 3.1 mmol/L      Chloride 124 mmol/L      CO2 13.0 mmol/L      Calcium 7.5 mg/dL      eGFR Non African Amer 46 mL/min/1.73      BUN/Creatinine Ratio 10.9     Anion Gap 5.0 mmol/L     Narrative:      GFR Normal >60  Chronic Kidney Disease <60  Kidney Failure <15      CBC Auto Differential [[id number]]  (Abnormal) Collected: 02/05/21 0254    Specimen: Blood Updated: 02/05/21 0342     WBC 13.00 10*3/mm3      RBC 2.92 10*6/mm3      Hemoglobin 8.5 g/dL      Hematocrit 26.3 %      MCV 90.1 fL      MCH 29.1 pg      MCHC 32.3 g/dL      RDW 15.8 %      RDW-SD 50.3 fl      MPV 8.4 fL      Platelets 274 10*3/mm3      Neutrophil % 61.8 %      Lymphocyte % 32.9 %      Monocyte % 3.9 %      Eosinophil % 0.9 %      Basophil % 0.5 %      Neutrophils, Absolute 8.00 10*3/mm3      Lymphocytes, Absolute 4.30 10*3/mm3      Monocytes, Absolute 0.50 10*3/mm3      Eosinophils, Absolute 0.10 10*3/mm3      Basophils, Absolute 0.10 10*3/mm3      nRBC 0.2 /100 WBC     Potassium [[id number]]  (Normal) Collected: 02/04/21 2358    Specimen: Blood Updated: 02/05/21 0115     Potassium 3.5 mmol/L     COVID PRE-OP / PRE-PROCEDURE SCREENING ORDER (NO ISOLATION) - Swab, Nasopharynx [[id number]]  (Normal) Collected: 02/04/21 1439    Specimen: Swab from Nasopharynx Updated: 02/04/21 2346    Narrative:      The following orders were created for panel order COVID  PRE-OP / PRE-PROCEDURE SCREENING ORDER (NO ISOLATION) - Swab, Nasopharynx.  Procedure                               Abnormality         Status                     ---------                               -----------         ------                     COVID-19,APTIMA PANTHER,...[[id number]]  Normal              Final result                 Please view results for these tests on the individual orders.    COVID-19,APTIMA PANTHER,[facility], NP/OP SWAB IN UTM/VTM/[facility] TRANSPORT MEDIA,24 HR TAT - Swab, Nasopharynx [[id number]]  (Normal) Collected: 02/04/21 1439    Specimen: Swab from Nasopharynx Updated: 02/04/21 2346     COVID19 Not Detected    Narrative:      Fact sheet for providers: https://www.fda.gov/media/409995/download     Fact sheet for patients: https://www.fda.gov/media/801202/download    Test performed by RT PCR.    Basic Metabolic Panel [[id number]]  (Abnormal) Collected: 02/04/21 1556    Specimen: Blood Updated: 02/04/21 1735     Glucose 79 mg/dL      BUN 15 mg/dL      Creatinine 1.18 mg/dL      Sodium 144 mmol/L      Potassium 3.1 mmol/L      Chloride 120 mmol/L      CO2 13.0 mmol/L      Calcium 7.9 mg/dL      eGFR Non African Amer 50 mL/min/1.73      BUN/Creatinine Ratio 12.7     Anion Gap 11.0 mmol/L     Narrative:      GFR Normal >60  Chronic Kidney Disease <60  Kidney Failure <15      Extra Tubes [[id number]] Collected: 02/04/21 1556    Specimen: Blood Updated: 02/04/21 1557    Narrative:      The following orders were created for panel order Extra Tubes.  Procedure                               Abnormality         Status                     ---------                               -----------         ------                     Green Top (Gel)[[id number]]                                  Final result                 Please view results for these tests on the individual orders.    Green Top (Gel) [[id number]] Collected: 02/04/21 1556    Specimen: Blood Updated: 02/04/21 1557     Extra Tube In storage.           Imaging Results (Last 24 Hours)     ** No results found for the last 24 hours. **             ASSESSMENT AND PLAN:      Active Problems:    Hypokalemia  Send home with oral KCL (unclear cause, may need to see Nephrology)   Chronic dysphagia  Treated HCV  Outpt EGD in 2 weeks for dilation  Stay on PPI for now    I discussed the patients findings and my recommendations with the patient.  Deondre MD  02/05/21  15:46 EST    Time: 15 min

## 2021-02-05 NOTE — CONSULTS
Nutrition Services    Patient Name: Elizabeth Gómez  YOB: 1975  MRN: [id number]  Admission date: 2/4/2021    Malnutrition (severe, chronic) related to extremely poor intake in the setting of chronic anxiety and PTSD, along with possible swallowing difficulty; as evidenced by ST/GI Dx, ongoing minimal PO intake, diet recall reflecting pt meeting less than 50% needs for at least 3 months, weight loss greater than 7.5% in three months, and nutrition-focused physical exam revealing severe muscle and fat loss, with mild fluid accumulation in the feet and ankles.     RDN provided diet education today -- pt is to consume 3 meals + 3 snacks daily, and should drink an oral nutrition supplement any time she isn't up to completing a solid-food meal. Pt very receptive to education and was engaged in learning session -- pt very appreciative of RDN services. Encouraged her to contact this writer for any further questions/needs.    Will start Boost Plus TID, to provide an additional 1080 kcal and 42g protein.   Will also start Magic Cups on trays, to provide an additional 870 kcal and 27g protein.     Initial eating disorder screening performed today, and pt denies intentional diet restriction, but does endorse markedly increased stress recently. Pt denies feelings of guilt around eating and denies food aversions; denies sensory triggers or specific stress triggers for restriction. Does endorse increased general stressors in the last few months -- pt is not forthcoming about what these stressors are, but confirms good food security at home. In any case, pt has lost a significant amount of weight and is severely malnourished. In preparing for D/C, the following would be appropriate referrals and interventions:     ----- Pt would benefit from referral to outpatient dietitian with experience in therapeutic medical nutrition therapy for disordered eating pattern and chronic restriction.  While pt does not currently meet  "diagnostic criteria for a Clinical Eating Disorder, her long-term minimal intake and multi-nutrient deficiencies will make the weight restoration process especially challenging. She will require close nutritional monitoring with ongoing coaching during the weight restoration process.     ----- Additionally, pt would benefit from a referral to a therapist trained in disordered eating recovery, who can continue to screen for signs of a restrictive pattern, purging pattern, or sensory-processing disorder inhibiting food intake. These behaviors and patterns are especially difficult to observe in a short inpatient stay. Given pt's Hx including PTSD and ROJELIO, with self-reported \"high stress,\" ongoing work with a therapist may be helpful in promoting good self-care and adherence to a weight-restoration meal plan.     If pt's outpatient team (dietitan + therapist + PCP) determine she is exhibiting behaviors C/W disordered eating and is unable to demonstrate weight gain in the outpatient level of care, then could consider referral for higher level of care at a residential facility until stabilized and able to self-manage feeding with outpatient supervision.    ----- Nutrition-focused physical exam reveals S/S vitamin/mineral deficiencies.     + Recommend starting a complete multivitamin/mineral supplement    + Recommend starting a Super-B Complex to address low B-stores     ---> Pt with burning mouth syndrome and burning feet     ---> hair is falling out, nails splitting in layers    + Recommend serum B-12 level and supplementation if less than 400pg/mL    + Recommend vitamin D level -- adjust outpatient dosage if indicated    + Recommend testing B-1, B-6, zinc, and iron    Noted plan for esophageal dilation as outpatient -- if pt continues to have swallowing difficulties following this procedure, may consider more aggressive nutrition support until PO intake improves.    PPE Documentation        PPE Worn By Provider mask and " eye protection   PPE Worn By Patient  None     CLINICAL NUTRITION ASSESSMENT      Reason for Assessment :  Physician consult, BMI     H&P:  Elizabeth  is a 45-year-old cachectic female with  Chronic  Weight  Loss  Hx ,  Hx  of Severe protein malnutrition/Weight loss >  40lbs , Bipolar disorder, depression, PTSD, H/o Hepatitis C who comes in stating she has had a 20 pound weight loss over the last month or 2.  She states that she has swelling in her lower legs and some mild shortness of breath and weakness she states she was seen in urgent care yesterday and advised to come to the emergency room for evaluation.     Past Medical History:   Diagnosis Date   • ADHD    • Anxiety    • Bipolar 1 disorder (CMS/HCC)    • COPD (chronic obstructive pulmonary disease) (CMS/HCC)    • Depression    • Hep C w/o coma, chronic (CMS/HCC)    • PTSD (post-traumatic stress disorder)    • Substance abuse (CMS/HCC)    • Vitamin D deficiency        Past Surgical History:   Procedure Laterality Date   •  SECTION     • CHOLECYSTECTOMY     • COLONOSCOPY N/A 3/4/2020    Procedure: COLONOSCOPY;  Surgeon: Parker Lovell MD;  Location: [facility] ENDOSCOPY;  Service: Gastroenterology;  Laterality: N/A;  Post: aborted colon   • COLONOSCOPY N/A 3/5/2020    Procedure: COLONOSCOPY;  Surgeon: Parker Lovell MD;  Location: [facility] ENDOSCOPY;  Service: Gastroenterology;  Laterality: N/A;  poor prep   • DILATATION AND CURETTAGE      x2   • ENDOSCOPY N/A 3/4/2020    Procedure: ESOPHAGOGASTRODUODENOSCOPY with biopsy X2;  Surgeon: Parker Lovell MD;  Location: [facility] ENDOSCOPY;  Service: Gastroenterology;  Laterality: N/A;  Post: flattened gastric folds, GERD   • TONSILLECTOMY          Current Problems:   Per hospitalist note:     Hypokalemia secondary to poor intake  Potassium 2.8 on admission  Continue to supplement and monitor     Dysphagia as per speech eval  Patient therapy recommended GI consult  Will obtain GI  consult      Severe protein malnutrition/Weight loss  This is chronic ongoing problem since last year  BMI 14.91  Patient reports that she eats  Psych consult appreciated  TSH 4.7     CKD stage III A  Monitor kidney function while in the hospital  BMP in the morning  Nephrotoxic meds     Bipolar disorder, depression, PTSD  cont home lamictal, zyprexa, effexor  Psych follow-up     H/o Hepatitis C  Will need GI follow-up as outpatient     Monocytic normochromic anemia  Hemoglobin 10 on admission  We will monitor closely while she is in the hospital       Nutrition/Diet History         Narrative     2/5: Pt assessed due to extremely low BMI and rapid recent weight loss. Pt states she weighed 110 lb in December 2020, and now weighs only 86lb.  States she has no appetite, her mouth hurts and burns constantly (NFPE reveals bright red tongue), and she has extremely early satiety. ED screening performed, and pt not forthcoming with answers to support definitive ED pattern, but pt definitely is under-eating -- has been eating only 1 meal daily and gets full after just a few bites. Discussed the challenges with weight restoration when starting from such a low weight -- pt agreeable to discussion and voices willingness to try strategies for weight restoration. Provided diet education to consume 3 meals + 3 snacks daily with meal-reminder timer to re-establish frequent eating pattern; and should drink an oral nutrition supplement any time she isn't up to completing a solid-food meal. Pt very receptive to education and was engaged in learning session -- pt very appreciative of RDN services. Encouraged her to contact this writer for any further questions/needs.     Functional Status Difficulty grasping, opening jars, generalized weakness beyond baseline per pt; frequent dizzy spells      Food Allergies NKFA     Factors Affecting   Nutritional Intake Possible dietary restriction as stress response -- needs outpatient follow-up   "    Anthropometrics        Current Height, Weight Height: 162.6 cm (64\")  Weight: 39.4 kg (86 lb 13.8 oz) (02/04/21 1515)        Admit Height, Weight -    Flowsheet Rows      First Filed Value   Admission Height  162.6 cm (64\") Documented at 02/04/2021 0924   Admission Weight  36.5 kg (80 lb 7.5 oz) Documented at 02/04/2021 0924             Ideal Body Weight (IBW) 120 lb   % Ideal Body Weight 72%       Usual Body Weight UTD   % Usual Body Weight UTD   Wt Change Observation 2/5: Based on reported weight Hx, pt has lost 21.82% body weight since December 2020 -- apparently weighed 111lb in December; now 86 lb   Weight Hx    Wt Readings from Last 30 Encounters:   02/04/21 1515 39.4 kg (86 lb 13.8 oz)   02/04/21 0924 36.5 kg (80 lb 7.5 oz)   02/03/21 1230 35.4 kg (78 lb)   03/06/20 0411 44.2 kg (97 lb 7.1 oz)   03/05/20 1149 43.1 kg (95 lb 0.3 oz)   03/05/20 0427 43.1 kg (95 lb)   03/04/20 0430 43 kg (94 lb 12.8 oz)   03/03/20 0307 44.6 kg (98 lb 5.2 oz)   03/02/20 2131 43.4 kg (95 lb 10.9 oz)   03/02/20 1312 43.1 kg (95 lb 0.3 oz)   02/24/20 1236 43.1 kg (95 lb)   08/26/19 0235 57.5 kg (126 lb 12.8 oz)   08/25/19 1312 58.6 kg (129 lb 3 oz)   08/25/19 0600 57.1 kg (125 lb 14.1 oz)   08/24/19 1403 54.9 kg (121 lb)   08/24/19 0502 55.3 kg (121 lb 14.6 oz)   08/23/19 1527 56.8 kg (125 lb 3.5 oz)   08/23/19 1500 56.8 kg (125 lb 3.5 oz)   08/23/19 0909 57.4 kg (126 lb 8.7 oz)   08/23/19 1636 57.2 kg (126 lb)   08/21/19 1154 54.9 kg (121 lb 0.5 oz)   09/20/18 0825 70.3 kg (155 lb)   07/27/18 0857 62.1 kg (137 lb)   02/08/18 1322 59 kg (130 lb)        BMI kg/m2 Body mass index is 14.91 kg/m².       Labs/Medications         Pertinent Labs -   Results from last 7 days   Lab Units 02/05/21  1423 02/05/21  0254 02/04/21  2358 02/04/21  1556 02/04/21  0956   SODIUM mmol/L  --  142  --  144 142   POTASSIUM mmol/L 3.0* 3.1* 3.5 3.1* 2.8*   CHLORIDE mmol/L  --  124*  --  120* 116*   CO2 mmol/L  --  13.0*  --  13.0* 14.0*   BUN mg/dL  " --  14  --  15 15   CREATININE mg/dL  --  1.28*  --  1.18* 1.28*   CALCIUM mg/dL  --  7.5*  --  7.9* 8.7   BILIRUBIN mg/dL  --   --   --   --  0.2   ALK PHOS U/L  --   --   --   --  139*   ALT (SGPT) U/L  --   --   --   --  22   AST (SGOT) U/L  --   --   --   --  22   GLUCOSE mg/dL  --  95  --  79 84     Results from last 7 days   Lab Units 02/05/21  0254 02/04/21  0956   MAGNESIUM mg/dL  --  2.1   HEMOGLOBIN g/dL 8.5* 10.0*   HEMATOCRIT % 26.3* 29.3*     COVID19   Date Value Ref Range Status   02/04/2021 Not Detected Not Detected - Ref. Range Final     Lab Results   Component Value Date    HGBA1C 4.9 03/03/2020         Pertinent Medications Buspar, vitamin D3, lovenox, lamictal, protonix, zyprexa, mirapex, effexor-XR, ambien     Physical Findings        Overall Physical   Appearance, MSA 2/5: Pt severely malnourished on exam   --  Edema  +1-+2 edema to feet/ankles on palpation during exam     Gastrointestinal BM 2/2 -- pt denies constipation      Tubes No feeding tube in place     Oral/Mouth Cavity Burning tongue --- pt has bright red tongue and reports constant burning and pain, maral when trying to eat     Skin Intact      --  Current Nutrition Orders & Evaluation of Intake       Oral Nutrition     Current PO Diet Diet Regular   Supplement -   PO Evaluation     % PO Intake % intake at meals since admission    --  Clinical Course    Nutrition Course Details On Regular diet throughout admission so far      Nutritional Risk Screening        NRS-2002 Score   -       Nutrition Diagnosis         Nutrition Dx Problem 1 Malnutrition (severe, chronic) related to extremely poor intake in the setting of chronic anxiety and PTSD, along with possible swallowing difficulty; as evidenced by ST/GI Dx, ongoing minimal PO intake, diet recall reflecting pt meeting less than 50% needs for at least 3 months, weight loss greater than 7.5% in three months, and nutrition-focused physical exam revealing severe muscle and fat loss,  with mild fluid accumulation in the feet and ankles.       Nutrition Dx Problem 2 -       Intervention Goal         Intervention Goal(s) Intakes fair-good at meals; pt accepting of oral nutrition supplements     Nutrition Intervention        RD/Tech Action Will start Boost Plus TID and Magic Cup TID     Nutrition Prescription          Diet Prescription Regular   Supplement Prescription Boost Plus TID, Magic Cup TID   --  Monitor/Evaluation        Monitor I&O, PO intake, Supplement intake, Pertinent labs, Weight, Skin status, GI status, Swallow function     Malnutrition Severity Assessment      Patient meets criteria for : Severe Malnutrition     Malnutrition Type (last 8 hours)      Malnutrition Severity Assessment     Row Name 02/05/21 1555       Malnutrition Severity Assessment    Malnutrition Type  Chronic Disease - Related Malnutrition    Row Name 02/05/21 1555       Insufficient Energy Intake     Insufficient Energy Intake Findings  Severe    Insufficient Energy Intake   <50% of est. energy requirement for >or equal to 1 month    Row Name 02/05/21 1555       Unintentional Weight Loss     Unintentional Weight Loss Findings  Severe    Unintentional Weight Loss   Weight loss greater than 7.5% in three months Pt states wt in Dec 110#; now 86#    Row Name 02/05/21 1555       Muscle Loss    Loss of Muscle Mass Findings  Severe    [place] Region  Severe - deep hollowing/scooping, lack of muscle to touch, facial bones well defined    Clavicle Bone Region  Severe - protruding prominent bone    Acromion Bone Region  Severe - squared shoulders, bones, and acromion process protrusion prominent    Scapular Bone Region  Severe - prominent bones, depressions easily visible between ribs, scapula, spine, shoulders    Dorsal Hand Region  Severe - prominent depression    Patellar Region  Severe - prominent bone, square looking, very little muscle definition    Anterior Thigh Region  Severe - line/depression along thigh, obviously  thin    Posterior Calf Region  Severe - thin with very little definition/firmness    Row Name 02/05/21 1555       Fat Loss    Subcutaneous Fat Loss Findings  Severe    Orbital Region   Severe - pronounced hollowness/depression, dark circles, loose saggy skin    Upper Arm Region  Severe - mostly skin, very little space between folds, fingers touch    Thoracic & Lumbar Region  Severe - ribs visible with prominent depressions, iliac crest very prominent    Row Name 02/05/21 1555       Fluid Accumulation (Edema)    Fluid Acumulation Findings  Mild    Fluid Accumulation   Mild equals 1+ pitting edema    Row Name 02/05/21 1557       Declining Functional Status    Declining Functional Status Findings  -- Pt reports unable to open jars/grasp as much as used to    Row Name 02/05/21 155       Criteria Met (Must meet criteria for severity in at least 2 of these categories: M Wasting, Fat Loss, Fluid, Secondary Signs, Wt. Status, Intake)    Patient meets criteria for   Severe Malnutrition             Electronically signed by:  Chayo Feldman RD  02/05/21 15:57 EST

## 2021-02-05 NOTE — NURSING NOTE
Dr Cisse called and said that this patient follows with him as an outpatient. Informed him that he was consulted because the patient has had a significant weight loss and that she says she feels like things get stuck when she swallows. Dr Cisse said that she she has has to have a dilation in the past and probably needs one again.   He said he wont do that on the weekend and she can be seen in the office to schedule

## 2021-02-05 NOTE — CONSULTS
Referring Provider: Dr Hilliard   Reason for Consultation: bipolar d/o, weight loss       Chief complaint fatigue, stomach pain, abd distention     Subjective .     History of present illness:  The patient is a 45 y.o. female who was admitted secondary to 20 lbs weight loss, fatigue, LE swelling . PMH: chronic weight loss, bipolar d/o, PTSD, hep C. Psych consult was requested by Dr Hilliard 2ry to bipolar d/o and weight loss.  The pt reported long hx of bipolar d/o, PTSD, was stable on current meds for few years, she sees Dr Bryna at [facility]. The pt denied any major mood problems until 1 month ago when she started having severe GI discomfort that resulted in decreased PO intake and severe weight loss. The pt expressed concerns about weight loss, about health in general , but denied feeling hopeless/helpless, denied AVH/SI/HI . The pt lives with 2 children , her mother is supportive. The pt was concerned about her weight, feels hungry, making attempts to eat and after few bites she feels severe stomach discomfort and fear.   Past psych hx: bipolar d/o, no inpt tx, no SAs, f/u at [facility] with Dr Bryan    Opioid dependence on agonist therapy     Review of Systems   All systems were reviewed and negative except for:  Constitution:  positive for fatigue and weight loss  Gastrointestinal: positive for  bloating / distention and pain  Musculoskeletal: positive for  muscle pain and muscle weakness  Neurological: positive for  weakness  Behavioral/Psych: positive for  anxiety and depression    History    Past Medical History:   Diagnosis Date   • ADHD    • Anxiety    • Bipolar 1 disorder (CMS/HCC)    • COPD (chronic obstructive pulmonary disease) (CMS/[facility])    • Depression    • Hep C w/o coma, chronic (CMS/[facility])    • PTSD (post-traumatic stress disorder)    • Substance abuse (CMS/[facility])    • Vitamin D deficiency           Family History   Problem Relation Age of Onset   • Heart disease Mother    • Heart disease  Maternal Grandmother    • Heart disease Maternal Grandfather         Social History     Tobacco Use   • Smoking status: Current Every Day Smoker     Packs/day: 1.00     Types: Cigarettes   Substance Use Topics   • Alcohol use: No     Frequency: Never   • Drug use: No          Medications Prior to Admission   Medication Sig Dispense Refill Last Dose   • buprenorphine-naloxone (SUBOXONE) 8-2 MG per SL tablet Place 1 tablet under the tongue 3 (Three) Times a Day.   2/4/2021 at 0800   • busPIRone (BUSPAR) 30 MG tablet Take 30 mg by mouth 2 (Two) Times a Day.   2/4/2021 at 0800   • cholecalciferol (VITAMIN D3) 10 MCG (400 UNIT) tablet Take 3,000 Units by mouth Daily.   2/4/2021 at 0800   • dicyclomine (BENTYL) 20 MG tablet Take 20 mg by mouth 3 (Three) Times a Day.   2/4/2021 at 0800   • docusate sodium (COLACE) 100 MG capsule Take 100 mg by mouth 2 (Two) Times a Day.   2/4/2021 at 0800   • lamoTRIgine (LAMICTAL) 100 MG tablet Take 100 mg by mouth Daily.   2/4/2021 at 0800   • OLANZapine (zyPREXA) 10 MG tablet Take 40 mg by mouth Every Night.   2/3/2021 at Unknown time   • pantoprazole (PROTONIX) 40 MG EC tablet Take 40 mg by mouth Daily.   2/4/2021 at 0800   • pramipexole (MIRAPEX) 0.5 MG tablet Take 0.5 mg by mouth At Night As Needed.      • ramelteon (ROZEREM) 8 MG tablet Take 8 mg by mouth Every Night.   2/3/2021 at Unknown time   • sodium bicarbonate 650 MG tablet Take 650 mg by mouth 3 (Three) Times a Day.   2/4/2021 at 0800   • venlafaxine XR (EFFEXOR-XR) 150 MG 24 hr capsule Take 225 mg by mouth every night at bedtime.   2/3/2021 at Unknown time        Scheduled Meds:  buprenorphine-naloxone, 1 tablet, Sublingual, TID  busPIRone, 30 mg, Oral, Q12H  cholecalciferol, 1,000 Units, Oral, Daily  enoxaparin, 40 mg, Subcutaneous, Q24H  lamoTRIgine, 100 mg, Oral, Daily  OLANZapine, 40 mg, Oral, Nightly  pantoprazole, 40 mg, Oral, Daily  pramipexole, 0.5 mg, Oral, Nightly  sodium chloride, 10 mL, Intravenous,  "Q12H  venlafaxine XR, 150 mg, Oral, Daily With Breakfast  zolpidem, 5 mg, Oral, Nightly         Continuous Infusions:       PRN Meds:  magnesium sulfate **OR** magnesium sulfate in D5W 1g/100mL (PREMIX)  •  potassium chloride **OR** potassium chloride **OR** potassium chloride  •  sodium chloride  •  sodium chloride      Allergies:  Erythromycin base and Lithium      Objective     Vital Signs   BP 98/62 (BP Location: Right arm, Patient Position: Lying)   Pulse 97   Temp 99.1 °F (37.3 °C) (Oral)   Resp 14   Ht 162.6 cm (64\")   Wt 39.4 kg (86 lb 13.8 oz)   SpO2 97%   Breastfeeding No   BMI 14.91 kg/m²     Physical Exam:     General Appearance:    Cachectic, in NAD    Head:    Normocephalic, without obvious abnormality, atraumatic   Eyes:            Lids and lashes normal, conjunctivae and sclerae normal, no   icterus, no pallor, corneas clear, PERRLA   Skin:   No bleeding, bruising or rash          Neurologic:   Cranial nerves 2 - 12 grossly intact, sensation intact, DTR       present and equal bilaterally. Muscle tone - weak, no cogwheel rigidity        Mental Status Exam:    Hygiene:   good  Cooperation:  Cooperative  Eye Contact:  Good  Psychomotor Behavior:  Slow  Affect:  Restricted   Mood \" tired\"   Hopelessness: Denies  Speech:  Normal  Thought Progress:  Goal directed and Linear  Thought Content:  Mood congruent  Suicidal:  None  Homicidal:  None  Hallucinations:  None  Delusion:  None  Memory:  Intact  Orientation:  Person, Place, Time and Situation  Reliability:  good  Insight:  Fair  Judgement:  Fair  Impulse Control:  Fair  Physical/Medical Issues:  Yes      Medications and allergies reviewed     Lab Results   Component Value Date    GLUCOSE 95 02/05/2021    CALCIUM 7.5 (L) 02/05/2021     02/05/2021    K 3.1 (L) 02/05/2021    CO2 13.0 (L) 02/05/2021     (H) 02/05/2021    BUN 14 02/05/2021    CREATININE 1.28 (H) 02/05/2021    EGFRIFNONA 46 (L) 02/05/2021    BCR 10.9 02/05/2021    " ANIONGAP 5.0 02/05/2021       Last Urine Toxicity     LAST URINE TOXICITY RESULTS Latest Ref Rng & Units 5/28/2020 3/2/2020    CREATININE UR mg/dL 47.7 -    AMPHETAMINES SCREEN, URINE Negative - -    BARBITURATES SCREEN Negative - Negative    BENZODIAZEPINE SCREEN, URINE Negative - Negative    COCAINE SCREEN, URINE Negative - Negative    METHADONE SCREEN, URINE Negative - Negative          No results found for: PHENYTOIN, PHENOBARB, VALPROATE, CBMZ    Lab Results   Component Value Date    LITHIUM 0.4 (L) 08/23/2019     02/05/2021    BUN 14 02/05/2021    CREATININE 1.28 (H) 02/05/2021    TSH 4.760 (H) 02/04/2021    WBC 13.00 (H) 02/05/2021       Brief Urine Lab Results  (Last result in the past 365 days)      Color   Clarity   Blood   Leuk Est   Nitrite   Protein   CREAT   Urine HCG        05/28/20 1028             47.7       05/28/20 1028 Yellow Clear Negative Negative Negative 30 mg/dL (1+)               Assessment/Plan       Hypokalemia       Assessment: Bipolar d/o MRE depressed, PTSD, opioid dependence on agonist therapy   Treatment Plan: the pt has a hx of bipolar d/o, PTSD and chemical dependence , the pt is on high dose of zyprexa Buspirone and buprenorphine (24 mg for her weight) . As far as her mood- it is stable, just reasonable worries about her weight and GI pain . The pt was on buprenorphine for some time, buprenorphine can cause GI issues, the pt will benefit from tapering down to 20 mg and eventually to 16 mg , I am not saying that is the only problem but it can contribute to worsening of her GI sxs.   Will follow   Treatment Plan discussed with: Patient and nursing       I discussed the patients findings and my recommendations with patient and nursing staff    I have reviewed and approved the behavioral health treatment plans and problem list. Yes  Thank you for the consult   Referring MD has access to consult report and progress notes in EMR     Blank Warner MD  02/05/21  09:28  EST

## 2021-02-05 NOTE — NURSING NOTE
Notified dr carney of patients potassium being 3.0 after her three doses of the potassium protocol.   She said she is going to keep the patient and to start the protocol over.

## 2021-02-05 NOTE — PROGRESS NOTES
Discharge Planning Assessment   Andrzej     Patient Name: Elizabeth Gómez  MRN: [id number]  Today's Date: 2/5/2021    Admit Date: 2/4/2021    Discharge Needs Assessment     Row Name 02/05/21 1401       Living Environment    Lives With  child(juan), dependent    Current Living Arrangements  home/apartment/condo    Primary Care Provided by  self    Provides Primary Care For  child(juan)    Family Caregiver if Needed  parent(s)    Able to Return to Prior Arrangements  yes       Transition Planning    Patient/Family Anticipates Transition to  home    Patient/Family Anticipated Services at Transition  none    Transportation Anticipated  family or friend will provide       Discharge Needs Assessment    Equipment Currently Used at Home  none    Concerns to be Addressed  no discharge needs identified        Discharge Plan     Row Name 02/05/21 1402       Plan    Plan  anticipate return home    Patient/Family in Agreement with Plan  yes    Plan Comments  spoke to patient in room with ppe(mask and goggles); staying 6 feet away and less than 15 mins; patient states she cares for her children; she drives and her mother helps; she has no pcp so [facility]; she gets [facility] from [facility]       Carol naegele rn  Case management  Office number [phone]  Cell phone [phone]

## 2021-02-05 NOTE — PLAN OF CARE
Goal Outcome Evaluation:        Outcome Summary: Patient K+ came up to 3.5 last night.  Waiting on morning results.  Patient has a psych consult out for bipolar and wasting away.  Patient slept most of the night.  Will continue to monitor.

## 2021-02-05 NOTE — PLAN OF CARE
Goal Outcome Evaluation:  Plan of Care Reviewed With: patient     Outcome Summary: Pt seen for clinical swallow evaluation in the setting of recent significant weight loss. Pt reports symptoms of possible esophageal dysphagia including reflux, food sticking and need for multiple liquid washes to clear material vs vomiting to relieve the sensation. Pt reports that she has an established GI MD and has a hx of esophageal dilation x2, with the last one being 2 years ago. Pt accepted trials of thins, puree, mech soft, and regular. Oral phase WFL. No overt s/s of aspiration observed at any time. Skilled ST not indicated at this time at her swallow function appears WFL - ST to sign off at this time. Please re-consult if any concerns arise.    REC: continue regular diet and thin liquids, meds as tolerated, and GI consult as an OP. Discussed recs with pt and RN who verbalized understanding.

## 2021-02-05 NOTE — THERAPY EVALUATION
Acute Care - Speech Language Pathology   Swallow Initial Evaluation [facility]     Patient Name: Elizabeth Gómez  : 1975  MRN: [id number]  Today's Date: 2021               Admit Date: 2021    Visit Dx:     ICD-10-CM ICD-9-CM   1. Hypokalemia  E87.6 276.8   2. Chronic anemia  D64.9 285.9   3. Cachectic (CMS/HCC)  R64 799.4   4. Dehydration  E86.0 276.51     Patient Active Problem List   Diagnosis   • Syncope and collapse   • Bipolar I disorder, most recent episode depressed (CMS/HCC)   • Generalized anxiety disorder   • Vitamin B12 deficiency   • Metabolic acidosis   • Nasal fracture   • Hypokalemia   • Chest pain   • Abdominal pain   • PTSD (post-traumatic stress disorder)   • Hep C w/o coma, chronic (CMS/HCC)   • Severe malnutrition (CMS/HCC)   • Constipation   • Anemia   • Acute bronchitis   • Eustachian tube dysfunction, right   • Nausea     Past Medical History:   Diagnosis Date   • ADHD    • Anxiety    • Bipolar 1 disorder (CMS/HCC)    • COPD (chronic obstructive pulmonary disease) (CMS/HCC)    • Depression    • Hep C w/o coma, chronic (CMS/[facility])    • PTSD (post-traumatic stress disorder)    • Substance abuse (CMS/[facility])    • Vitamin D deficiency      Past Surgical History:   Procedure Laterality Date   •  SECTION     • CHOLECYSTECTOMY     • COLONOSCOPY N/A 3/4/2020    Procedure: COLONOSCOPY;  Surgeon: Parker Lovell MD;  Location: [facility] ENDOSCOPY;  Service: Gastroenterology;  Laterality: N/A;  Post: aborted colon   • COLONOSCOPY N/A 3/5/2020    Procedure: COLONOSCOPY;  Surgeon: Parker Lovell MD;  Location: [facility] ENDOSCOPY;  Service: Gastroenterology;  Laterality: N/A;  poor prep   • DILATATION AND CURETTAGE      x2   • ENDOSCOPY N/A 3/4/2020    Procedure: ESOPHAGOGASTRODUODENOSCOPY with biopsy X2;  Surgeon: Parker Lovell MD;  Location: [facility] ENDOSCOPY;  Service: Gastroenterology;  Laterality: N/A;  Post: flattened gastric folds, GERD   • TONSILLECTOMY           SWALLOW EVALUATION (last 72 hours)      SLP Adult Swallow Evaluation     Row Name 02/05/21 1500          Document Type  evaluation  -    Subjective Information  no complaints  -    Patient Observations  alert;cooperative;agree to therapy  -    Patient/Family/Caregiver Comments/Observations  Pt sitting up in bed, pleasant and agreeable to evaluation.   -    Patient Effort  good  -          Patient Profile Reviewed  yes  -    Pertinent History Of Current Problem  Pt is a 45-year-old female with admitted with hypokalemia. Hx of chronic weight loss and malnutrition, bipolar disorder, PTSD, depression, and hep C.  -    Current Method of Nutrition  regular textures;thin liquids  -    Prior Level of Function-Swallowing  esophageal concerns;no diet consistency restrictions  -    Plans/Goals Discussed with  patient  -    Barriers to Rehab  none identified  -          Additional Documentation  Pain Scale: FACES Pre/Post-Treatment (Group)  -          Pain: FACES Scale, Pretreatment  0-->no hurt  -    Posttreatment Pain Rating  0-->no hurt  -          Dentition Assessment  upper dentures/partial in place  -    Secretion Management  WNL/WFL  -    Mucosal Quality  moist, healthy  -          Oral Motor General Assessment  WFL  -          Respiratory Support Currently in Use  room air  -    Eating/Swallowing Skills  self-fed  -    Positioning During Eating  upright 90 degree;upright in bed  -    Utensils Used  spoon;straw  -    Consistencies Trialed  regular textures;soft textures;pureed;thin liquids  -          Oral Prep Phase  WFL  -    Oral Transit  WFL  -    Oral Residue  WFL  -    Pharyngeal Phase  no overt signs/symptoms of pharyngeal impairment  -    Clinical Swallow Evaluation Summary  Pt seen for clinical swallow evaluation in the setting of recent significant weight loss. Pt reports symptoms of possible esophageal dysphagia including reflux, food sticking and need  for multiple liquid washes to clear material vs vomiting to relieve the sensation. Pt reports that she has an established GI MD and has a hx of esophageal dilation x2, with the last one being 2 years ago. Pt accepted trials of thins, puree, mech soft, and regular. Oral phase WFL. No overt s/s of aspiration observed at any time. Skilled ST not indicated at this time at her swallow function appears WFL - ST to sign off at this time. Please re-consult if any concerns arise. REC: continue regular diet and thin liquids, meds as tolerated, and GI consult as an OP. Discussed recs with pt and RN who verbalized understanding.   -          SLP Swallowing Diagnosis  swallow WFL  -    Functional Impact  no impact on function  -    Swallow Criteria for Skilled Therapeutic Interventions Met  no problems identified which require skilled intervention  -          Therapy Frequency (Swallow)  evaluation only  -    SLP Diet Recommendation  regular textures;thin liquids  -    SLP Rec. for Method of Medication Administration  as tolerated  -    Anticipated Discharge Disposition (SLP)  home  -MF    Demonstrates Need for Referral to Another Service  gastroenterology  -      User Key  (r) = Recorded By, (t) = Taken By, (c) = Cosigned By    Initials Name Effective Dates    Roxanne Simeon SLP 06/17/19 -           EDUCATION  The patient has been educated in the following areas:   diet recs, need for GI consult.    SLP Recommendation and Plan  SLP Swallowing Diagnosis: swallow WFL  SLP Diet Recommendation: regular textures, thin liquids     SLP Rec. for Method of Medication Administration: as tolerated           Swallow Criteria for Skilled Therapeutic Interventions Met: no problems identified which require skilled intervention  Anticipated Discharge Disposition (SLP): home     Therapy Frequency (Swallow): evaluation only     Demonstrates Need for Referral to Another Service: gastroenterology                      Plan of  Care Reviewed With: patient  Outcome Summary: Pt seen for clinical swallow evaluation in the setting of recent significant weight loss. Pt reports symptoms of possible esophageal dysphagia including reflux, food sticking and need for multiple liquid washes to clear material vs vomiting to relieve the sensation. Pt reports that she has an established GI MD and has a hx of esophageal dilation x2, with the last one being 2 years ago. Pt accepted trials of thins, puree, mech soft, and regular. Oral phase WFL. No overt s/s of aspiration observed at any time. Skilled ST not indicated at this time at her swallow function appears WFL. REC: continue regular diet and thin liquids, meds as tolerated, and GI consult. Discussed recs with pt and RN who verbalized understanding. ST to sign off at this time. Please re-consult if any concerns arise.     Patient was not wearing a face mask during this therapy encounter. Therapist used appropriate personal protective equipment including mask, eye protection and gloves.  Mask used was standard procedure mask. Appropriate PPE was worn during the entire therapy session. Hand hygiene was completed before and after therapy session. Patient is not in enhanced droplet precautions.                    Time Calculation:       Therapy Charges for Today     Code Description Service Date Service Provider Modifiers Qty    [id number]  ST EVAL ORAL PHARYNG SWALLOW 5 2/5/2021 Roxanne Rogel SLP GN 1               CHIDI Crump  2/5/2021

## 2021-02-05 NOTE — PROGRESS NOTES
[facility] Daily Progress Note      Hospitalist Team  LOS 0 days      Patient Care Team:  Provider, No Known as PCP - General    Patient Location: 363/1      Subjective   Subjective     Chief Complaint / Subjective  Chief Complaint   Patient presents with   • Shortness of Breath         Brief Synopsis of Hospital Course/HPI    Elizabeth  is a 45-year-old cachectic female with  Chronic  Weight  Loss  Hx ,  Hx  of Severe protein malnutrition/Weight loss >  40lbs , Bipolar disorder, depression, PTSD, H/o Hepatitis C who comes in stating she has had a 20 pound weight loss over the last month or 2.  She states that she has swelling in her lower legs and some mild shortness of breath and weakness she states she was seen in urgent care yesterday and advised to come to the emergency room for evaluation.  She states she has generalized pain that is constant she rates it a 3/10 states that waxes and wanes in intensity        She denies any chest pains, no fevers no night sweats, no nausea no vomiting no diarrhea.  Review of records indicate that patient has had similar complaints and presented in the emergency room 3/12/2020.        She was evaluated in the emergency room and referred for admission for hypokalemia.       Date:: 2/5/2021  Patient seen today at bedside no overnight events reported.  She is tolerating diet.  Hypokalemia persist we will keep the patient 1 more day        Review of Systems   Constitution: Negative.   HENT: Negative.    Cardiovascular: Negative.    Respiratory: Negative.    Skin: Negative.    Gastrointestinal: Negative.    Neurological: Negative.          Objective   Objective      Vital Signs  Temp:  [97.9 °F (36.6 °C)-99.1 °F (37.3 °C)] 98.3 °F (36.8 °C)  Heart Rate:  [] 107  Resp:  [14-16] 16  BP: ()/(60-63) 101/60  Oxygen Therapy  SpO2: 100 %  Pulse Oximetry Type: Intermittent  Device (Oxygen Therapy): room air  Flowsheet Rows      First Filed  "Value   Admission Height  162.6 cm (64\") Documented at 02/04/2021 0924   Admission Weight  36.5 kg (80 lb 7.5 oz) Documented at 02/04/2021 0924        Intake & Output (last 3 days)       02/02 0701 - 02/03 0700 02/03 0701 - 02/04 0700 02/04 0701 - 02/05 0700 02/05 0701 - 02/06 0700    P.O.   240 960    IV Piggyback   1200     Total Intake(mL/kg)   1440 (36.5) 960 (24.4)    Net   +1440 +960                Lines, Drains & Airways    Active LDAs     Name:   Placement date:   Placement time:   Site:   Days:    Peripheral IV 02/04/21 1810 Anterior;Right Forearm   02/04/21 1810    Forearm   less than 1                  Physical Exam:    Physical Exam  Vitals signs reviewed.   Constitutional:       Comments: Cachectic frail female   HENT:      Head: Normocephalic and atraumatic.      Mouth/Throat:      Mouth: Mucous membranes are moist.   Eyes:      Pupils: Pupils are equal, round, and reactive to light.   Neck:      Musculoskeletal: Neck supple.   Cardiovascular:      Rate and Rhythm: Normal rate and regular rhythm.      Pulses: Normal pulses.      Heart sounds: Normal heart sounds.   Pulmonary:      Effort: Pulmonary effort is normal.      Breath sounds: Normal breath sounds.   Abdominal:      General: Abdomen is flat. Bowel sounds are normal.      Palpations: Abdomen is soft.   Skin:     General: Skin is warm and dry.      Capillary Refill: Capillary refill takes less than 2 seconds.   Neurological:      General: No focal deficit present.      Mental Status: She is alert and oriented to person, place, and time. Mental status is at baseline.             Results Review:     I reviewed the patient's new clinical results.      Lab Results (last 24 hours)     Procedure Component Value Units Date/Time    Potassium [[id number]]  (Abnormal) Collected: 02/05/21 1423    Specimen: Blood Updated: 02/05/21 1503     Potassium 3.0 mmol/L      Comment: Slight hemolysis detected by analyzer. Results may be affected.       Basic " Metabolic Panel [[id number]]  (Abnormal) Collected: 02/05/21 0254    Specimen: Blood Updated: 02/05/21 0403     Glucose 95 mg/dL      BUN 14 mg/dL      Creatinine 1.28 mg/dL      Sodium 142 mmol/L      Potassium 3.1 mmol/L      Chloride 124 mmol/L      CO2 13.0 mmol/L      Calcium 7.5 mg/dL      eGFR Non African Amer 46 mL/min/1.73      BUN/Creatinine Ratio 10.9     Anion Gap 5.0 mmol/L     Narrative:      GFR Normal >60  Chronic Kidney Disease <60  Kidney Failure <15      CBC Auto Differential [[id number]]  (Abnormal) Collected: 02/05/21 0254    Specimen: Blood Updated: 02/05/21 0342     WBC 13.00 10*3/mm3      RBC 2.92 10*6/mm3      Hemoglobin 8.5 g/dL      Hematocrit 26.3 %      MCV 90.1 fL      MCH 29.1 pg      MCHC 32.3 g/dL      RDW 15.8 %      RDW-SD 50.3 fl      MPV 8.4 fL      Platelets 274 10*3/mm3      Neutrophil % 61.8 %      Lymphocyte % 32.9 %      Monocyte % 3.9 %      Eosinophil % 0.9 %      Basophil % 0.5 %      Neutrophils, Absolute 8.00 10*3/mm3      Lymphocytes, Absolute 4.30 10*3/mm3      Monocytes, Absolute 0.50 10*3/mm3      Eosinophils, Absolute 0.10 10*3/mm3      Basophils, Absolute 0.10 10*3/mm3      nRBC 0.2 /100 WBC     Potassium [[id number]]  (Normal) Collected: 02/04/21 2358    Specimen: Blood Updated: 02/05/21 0115     Potassium 3.5 mmol/L     COVID PRE-OP / PRE-PROCEDURE SCREENING ORDER (NO ISOLATION) - Swab, Nasopharynx [[id number]]  (Normal) Collected: 02/04/21 1439    Specimen: Swab from Nasopharynx Updated: 02/04/21 2346    Narrative:      The following orders were created for panel order COVID PRE-OP / PRE-PROCEDURE SCREENING ORDER (NO ISOLATION) - Swab, Nasopharynx.  Procedure                               Abnormality         Status                     ---------                               -----------         ------                     COVID-19,APTIMA PANTHER,...[[id number]]  Normal              Final result                 Please view results for these tests on the individual  orders.    COVID-19,APTIMA PANTHER,[facility], NP/OP SWAB IN UTM/VTM/SALINE TRANSPORT MEDIA,24 HR TAT - Swab, Nasopharynx [[id number]]  (Normal) Collected: 02/04/21 1439    Specimen: Swab from Nasopharynx Updated: 02/04/21 2346     COVID19 Not Detected    Narrative:      Fact sheet for providers: https://www.fda.gov/media/059974/download     Fact sheet for patients: https://www.fda.gov/media/863681/download    Test performed by RT PCR.    Basic Metabolic Panel [[id number]]  (Abnormal) Collected: 02/04/21 1556    Specimen: Blood Updated: 02/04/21 1735     Glucose 79 mg/dL      BUN 15 mg/dL      Creatinine 1.18 mg/dL      Sodium 144 mmol/L      Potassium 3.1 mmol/L      Chloride 120 mmol/L      CO2 13.0 mmol/L      Calcium 7.9 mg/dL      eGFR Non African Amer 50 mL/min/1.73      BUN/Creatinine Ratio 12.7     Anion Gap 11.0 mmol/L     Narrative:      GFR Normal >60  Chronic Kidney Disease <60  Kidney Failure <15      Extra Tubes [[id number]] Collected: 02/04/21 1556    Specimen: Blood Updated: 02/04/21 1557    Narrative:      The following orders were created for panel order Extra Tubes.  Procedure                               Abnormality         Status                     ---------                               -----------         ------                     Green Top (Gel)[[id number]]                                  Final result                 Please view results for these tests on the individual orders.    Green Top (Gel) [[id number]] Collected: 02/04/21 1556    Specimen: Blood Updated: 02/04/21 1557     Extra Tube In storage.        No results found for: HGBA1C  Results from last 7 days   Lab Units 02/04/21  0956   INR  1.00           Lab Results   Component Value Date    LIPASE 190 (H) 02/24/2020     Lab Results   Component Value Date    CHOL 82 03/03/2020    TRIG 63 03/03/2020    HDL 30 (L) 03/03/2020    LDL 39 03/03/2020       Lab Results   Lab Value Date/Time    FINALDX  03/04/2020 1302     Specimen #1 (Stomach,  biopsy):    Chronic gastritis    H. pylori immunostain negative for Helicobacter organisms (control reacts appropriately)     Specimen #2 (Duodenum, biopsy):    Benign duodenal mucosa with preserved villous architecture and no significant histopathology     Negative for celiac disease    JPR/sms          Microbiology Results (last 10 days)     Procedure Component Value - Date/Time    COVID PRE-OP / PRE-PROCEDURE SCREENING ORDER (NO ISOLATION) - Swab, Nasopharynx [[id number]]  (Normal) Collected: 02/04/21 1439    Lab Status: Final result Specimen: Swab from Nasopharynx Updated: 02/04/21 2346    Narrative:      The following orders were created for panel order COVID PRE-OP / PRE-PROCEDURE SCREENING ORDER (NO ISOLATION) - Swab, Nasopharynx.  Procedure                               Abnormality         Status                     ---------                               -----------         ------                     COVID-19,APTIMA PANTHER,...[[id number]]  Normal              Final result                 Please view results for these tests on the individual orders.    COVID-19,APTIMA PANTHER,[facility], NP/OP SWAB IN UTM/VTM/SALINE TRANSPORT MEDIA,24 HR TAT - Swab, Nasopharynx [[id number]]  (Normal) Collected: 02/04/21 1439    Lab Status: Final result Specimen: Swab from Nasopharynx Updated: 02/04/21 2346     COVID19 Not Detected    Narrative:      Fact sheet for providers: https://www.fda.gov/media/727965/download     Fact sheet for patients: https://www.fda.gov/media/034672/download    Test performed by RT PCR.          ECG/EMG Results (most recent)     Procedure Component Value Units Date/Time    ECG 12 Lead [[id number]] Collected: 02/04/21 0945     Updated: 02/04/21 0947     QT Interval 385 ms     Narrative:      HEART RATE= 86  bpm  RR Interval= 696  ms  [place] Interval= 152  ms  P Horizontal Axis= 31  deg  P Front Axis= 75  deg  QRSD Interval= 94  ms  QT Interval= 385  ms  QRS Axis= 54  deg  T Wave Axis= -89  deg  -  ABNORMAL ECG -  Sinus rhythm  Repol abnrm suggests ischemia, diffuse leads  Electronically Signed By:   Date and Time of Study: 2021-02-04 09:45:37          Results for orders placed during the hospital encounter of 08/23/19   Duplex Carotid Ultrasound CAR    Narrative · Proximal right internal carotid artery mild stenosis.  · Proximal left internal carotid artery mild stenosis.          Results for orders placed during the hospital encounter of 08/23/19   Adult Transthoracic Echo Complete W/ Cont if Necessary Per Protocol    Narrative · Estimated EF = 60%.  · Left ventricular systolic function is normal.       Indications  Syncope    Technically satisfactory study.  Mitral valve is structurally normal.  Tricuspid valve is structurally normal.  Aortic valve is structurally normal.  Pulmonic valve could not be well visualized.  No evidence for mitral tricuspid or aortic regurgitation is seen by   Doppler study.  Left atrium is normal in size.  Right atrium is normal in size.  Left ventricle is normal in size and contractility with ejection fraction   of 60%.  Right ventricle is normal in size.  Atrial septum is intact.  Aorta is normal.  No pericardial effusion or intracardiac thrombus is seen.    Impression  Structurally and functionally normal cardiac valves.  Normal echo Doppler study.  Left ventricular ejection fraction is 60%.           Xr Chest 1 View    Result Date: 2/4/2021  No acute cardiopulmonary abnormality. Emphysema and changes of prior granulomatous disease exposure.  Electronically Signed By-Judith Lindsey MD On:2/4/2021 10:20 AM This report was finalized on [id number] by  Judith Lindsey MD.          Xrays, labs reviewed personally by physician.    Medication Review:   I have reviewed the patient's current medication list      Scheduled Meds  buprenorphine-naloxone, 1 tablet, Sublingual, TID  busPIRone, 30 mg, Oral, Q12H  calcium gluconate, 2 g, Intravenous, Once  cholecalciferol, 1,000 Units, Oral,  Daily  enoxaparin, 40 mg, Subcutaneous, Q24H  lamoTRIgine, 100 mg, Oral, Daily  OLANZapine, 40 mg, Oral, Nightly  pantoprazole, 40 mg, Oral, Daily  pramipexole, 0.5 mg, Oral, Nightly  sodium chloride, 10 mL, Intravenous, Q12H  venlafaxine XR, 150 mg, Oral, Daily With Breakfast  zolpidem, 5 mg, Oral, Nightly        Meds Infusions       Meds PRN  magnesium sulfate **OR** magnesium sulfate in D5W 1g/100mL (PREMIX)  •  potassium chloride **OR** potassium chloride **OR** potassium chloride  •  sodium chloride  •  sodium chloride        Assessment/Plan   Assessment/Plan     Active Hospital Problems    Diagnosis  POA   • Hypokalemia [E87.6]  Yes      Resolved Hospital Problems   No resolved problems to display.       MEDICAL DECISION MAKING COMPLEXITY BY PROBLEM:     Elizabeth  is a 45-year-old cachectic female with  Chronic  Weight  Loss  Hx ,  Hx  of Severe protein malnutrition/Weight loss >  40lbs , Bipolar disorder, depression, PTSD, H/o Hepatitis C who comes in stating she has had a 20 pound weight loss over the last month or 2.  She states that she has swelling in her lower legs and some mild shortness of breath and weakness she states she was seen in urgent care yesterday and advised to come to the emergency room for evaluation.  She states she has generalized pain that is constant she rates it a 3/10 states that waxes and wanes in intensity        She denies any chest pains, no fevers no night sweats, no nausea no vomiting no diarrhea.  Review of records indicate that patient has had similar complaints and presented in the emergency room 3/12/2020.        She was evaluated in the emergency room and referred for admission for hypokalemia.     Vitals on admission oxygen saturation 100% room air blood pressure 94/61 respiration 14 pulse 97 temperature 97.5        Assessment      Hypokalemia secondary to poor intake  Potassium 2.8 on admission  Continue to supplement and monitor    Dysphagia as per speech eval  Patient  therapy recommended GI consult  Will obtain GI consult       Severe protein malnutrition/Weight loss  This is chronic ongoing problem since last year  BMI 14.91  Patient reports that she eats  Psych consult appreciated  TSH 4.7     CKD stage III A  Monitor kidney function while in the hospital  BMP in the morning  Nephrotoxic meds     Bipolar disorder, depression, PTSD  cont home lamictal, zyprexa, effexor  Psych follow-up     H/o Hepatitis C  Will need GI follow-up as outpatient     Monocytic normochromic anemia  Hemoglobin 10 on admission  We will monitor closely while she is in the hospital    VTE Prophylaxis -   Mechanical Order History:     None      Pharmalogical Order History:      Ordered     Dose Route Frequency Stop    02/04/21 1325  enoxaparin (LOVENOX) syringe 40 mg      40 mg SC Every 24 Hours --                  Code Status -   Code Status and Medical Interventions:   Ordered at: 02/04/21 1325     Code Status:    CPR     Medical Interventions (Level of Support Prior to Arrest):    Full       This patient has been examined wearing appropriate Personal Protective Equipment       Discharge Planning  home      Electronically signed by Judy Hilliard MD, 02/05/21, 15:32 EST.  [facility]ist Team

## 2021-02-06 VITALS
OXYGEN SATURATION: 97 % | WEIGHT: 86.86 LBS | DIASTOLIC BLOOD PRESSURE: 60 MMHG | BODY MASS INDEX: 14.83 KG/M2 | TEMPERATURE: 98.3 F | HEART RATE: 99 BPM | RESPIRATION RATE: 15 BRPM | HEIGHT: 64 IN | SYSTOLIC BLOOD PRESSURE: 95 MMHG

## 2021-02-06 LAB
MAGNESIUM SERPL-MCNC: 1.7 MG/DL (ref 1.6–2.6)
POTASSIUM SERPL-SCNC: 3.8 MMOL/L (ref 3.5–5.2)

## 2021-02-06 PROCEDURE — 83735 ASSAY OF MAGNESIUM: CPT | Performed by: PHYSICIAN ASSISTANT

## 2021-02-06 PROCEDURE — 84132 ASSAY OF SERUM POTASSIUM: CPT | Performed by: INTERNAL MEDICINE

## 2021-02-06 PROCEDURE — G0378 HOSPITAL OBSERVATION PER HR: HCPCS

## 2021-02-06 PROCEDURE — 99217 PR OBSERVATION CARE DISCHARGE MANAGEMENT: CPT | Performed by: INTERNAL MEDICINE

## 2021-02-06 RX ADMIN — BUPRENORPHINE AND NALOXONE 1 TABLET: 8; 2 TABLET SUBLINGUAL at 09:20

## 2021-02-06 RX ADMIN — Medication 10 ML: at 09:20

## 2021-02-06 RX ADMIN — PANTOPRAZOLE SODIUM 40 MG: 40 TABLET, DELAYED RELEASE ORAL at 09:20

## 2021-02-06 RX ADMIN — Medication 1000 UNITS: at 09:20

## 2021-02-06 RX ADMIN — VENLAFAXINE HYDROCHLORIDE 150 MG: 75 CAPSULE, EXTENDED RELEASE ORAL at 09:20

## 2021-02-06 RX ADMIN — BUSPIRONE HYDROCHLORIDE 30 MG: 15 TABLET ORAL at 09:20

## 2021-02-06 RX ADMIN — LAMOTRIGINE 100 MG: 100 TABLET ORAL at 09:20

## 2021-02-06 NOTE — DISCHARGE SUMMARY
[facility] Medicine Services  DISCHARGE SUMMARY        Prepared For PCP:  Provider, No Known    Patient Name: Elizabeth Gómez  : 1975  MRN: [id number]      Date of Admission:   2021    Date of Discharge:  2021    Length of stay:  LOS: 0 days     Hospital Course     Presenting Problem:   Dehydration [E86.0]  Hypokalemia [E87.6]  Cachectic (CMS/HCC) [R64]  Chronic anemia [D64.9]      Active Hospital Problems    Diagnosis  POA   • Hypokalemia [E87.6]  Yes      Resolved Hospital Problems   No resolved problems to display.           Hospital Course:  Elizabeth Gómez is a 45 y.o. female female with  Chronic  Weight  Loss  Hx ,  Hx  of Severe protein malnutrition/Weight loss >  40lbs , Bipolar disorder, depression, PTSD, H/o Hepatitis C who comes in stating she has had a 20 pound weight loss over the last month or 2.  She states that she has swelling in her lower legs and some mild shortness of breath and weakness she states she was seen in urgent care yesterday and advised to come to the emergency room for evaluation.  She states she has generalized pain that is constant she rates it a 3/10 states that waxes and wanes in intensity        She denies any chest pains, no fevers no night sweats, no nausea no vomiting no diarrhea.  Review of records indicate that patient has had similar complaints and presented in the emergency room 3/12/2020.        She was evaluated in the emergency room and referred for admission for hypokalemia.     Vitals on admission oxygen saturation 100% room air blood pressure 94/61 respiration 14 pulse 97 temperature 97.5        She was   Admitted  For  Hypokalemia   Which has  Been  Corrected     GI was   Consulted   Since  She  Had  Esophageal  Dilatation is  The  Past,  And  The   Plan  To    FU  As  Out pt  In  2  Weeks     Regarding  Her   Persistent  Low   Bicarb  In might  To   Sec  To   Chronic  Vomiting    Will  cx   Nephrology  As  Out  Pt            Day of Discharge     HPI:   Elizabeth Gómez is a 45 y.o. female female with  Chronic  Weight  Loss  Hx ,  Hx  of Severe protein malnutrition/Weight loss >  40lbs , Bipolar disorder, depression, PTSD, H/o Hepatitis C who comes in stating she has had a 20 pound weight loss over the last month or 2.  She states that she has swelling in her lower legs and some mild shortness of breath and weakness she states she was seen in urgent care yesterday and advised to come to the emergency room for evaluation.  She states she has generalized pain that is constant she rates it a 3/10 states that waxes and wanes in intensity        She denies any chest pains, no fevers no night sweats, no nausea no vomiting no diarrhea.  Review of records indicate that patient has had similar complaints and presented in the emergency room 3/12/2020.        She was evaluated in the emergency room and referred for admission for hypokalemia.    Vital Signs:   Temp:  [98.3 °F (36.8 °C)] 98.3 °F (36.8 °C)  Heart Rate:  [] 99  Resp:  [15-16] 15  BP: ()/(56-62) 95/60     Physical Exam:  Physical Exam  Vitals signs reviewed.   Constitutional:       Appearance: Normal appearance.   HENT:      Head: Normocephalic and atraumatic.      Mouth/Throat:      Mouth: Mucous membranes are moist.   Eyes:      Pupils: Pupils are equal, round, and reactive to light.   Neck:      Musculoskeletal: Normal range of motion and neck supple.   Cardiovascular:      Rate and Rhythm: Normal rate and regular rhythm.      Heart sounds: Normal heart sounds.   Pulmonary:      Effort: Pulmonary effort is normal.      Breath sounds: Normal breath sounds.   Abdominal:      General: Abdomen is flat. Bowel sounds are normal.      Palpations: Abdomen is soft.   Skin:     General: Skin is warm and dry.      Capillary Refill: Capillary refill takes less than 2 seconds.   Neurological:      General: No focal deficit present.      Mental Status: She is alert and oriented  to person, place, and time. Mental status is at baseline.         Pertinent  and/or Most Recent Results     Results from last 7 days   Lab Units 02/06/21  0020 02/05/21  1423 02/05/21  0254 02/04/21  2358 02/04/21  1556 02/04/21  0956   WBC 10*3/mm3  --   --  13.00*  --   --  9.00   HEMOGLOBIN g/dL  --   --  8.5*  --   --  10.0*   HEMATOCRIT %  --   --  26.3*  --   --  29.3*   PLATELETS 10*3/mm3  --   --  274  --   --  314   SODIUM mmol/L  --   --  142  --  144 142   POTASSIUM mmol/L 3.8 3.0* 3.1* 3.5 3.1* 2.8*   CHLORIDE mmol/L  --   --  124*  --  120* 116*   CO2 mmol/L  --   --  13.0*  --  13.0* 14.0*   BUN mg/dL  --   --  14  --  15 15   CREATININE mg/dL  --   --  1.28*  --  1.18* 1.28*   GLUCOSE mg/dL  --   --  95  --  79 84   CALCIUM mg/dL  --   --  7.5*  --  7.9* 8.7     Results from last 7 days   Lab Units 02/04/21  0956   BILIRUBIN mg/dL 0.2   ALK PHOS U/L 139*   ALT (SGPT) U/L 22   AST (SGOT) U/L 22   PROTIME Seconds 11.0   INR  1.00           Invalid input(s): TG, LDLCALC, LDLREALC  Results from last 7 days   Lab Units 02/04/21  1406 02/04/21  0956   TSH uIU/mL  --  4.760*   TROPONIN T ng/mL <0.010 0.023       Brief Urine Lab Results  (Last result in the past 365 days)      Color   Clarity   Blood   Leuk Est   Nitrite   Protein   CREAT   Urine HCG        05/28/20 1028             47.7       05/28/20 1028 Yellow Clear Negative Negative Negative 30 mg/dL (1+)               Microbiology Results Abnormal     Procedure Component Value - Date/Time    COVID PRE-OP / PRE-PROCEDURE SCREENING ORDER (NO ISOLATION) - Swab, Nasopharynx [[id number]]  (Normal) Collected: 02/04/21 1439    Lab Status: Final result Specimen: Swab from Nasopharynx Updated: 02/04/21 1222    Narrative:      The following orders were created for panel order COVID PRE-OP / PRE-PROCEDURE SCREENING ORDER (NO ISOLATION) - Swab, Nasopharynx.  Procedure                               Abnormality         Status                     ---------                                -----------         ------                     COVID-19,APTIMA PANTHER,...[[id number]]  Normal              Final result                 Please view results for these tests on the individual orders.    COVID-19,APTIMA PANTHERHERMAN IN-HOUSE, NP/OP SWAB IN UTM/VTM/SALINE TRANSPORT MEDIA,24 HR TAT - Swab, Nasopharynx [[id number]]  (Normal) Collected: 02/04/21 1439    Lab Status: Final result Specimen: Swab from Nasopharynx Updated: 02/04/21 2346     COVID19 Not Detected    Narrative:      Fact sheet for providers: https://www.fda.gov/media/991930/download     Fact sheet for patients: https://www.fda.gov/media/366493/download    Test performed by RT PCR.          Xr Chest 1 View    Result Date: 2/4/2021  Impression: No acute cardiopulmonary abnormality. Emphysema and changes of prior granulomatous disease exposure.  Electronically Signed By-Judith Lindsey MD On:2/4/2021 10:20 AM This report was finalized on [id number] by  Judith Lindsey MD.      Results for orders placed during the hospital encounter of 08/23/19   Duplex Carotid Ultrasound CAR    Narrative · Proximal right internal carotid artery mild stenosis.  · Proximal left internal carotid artery mild stenosis.          Results for orders placed during the hospital encounter of 08/23/19   Duplex Carotid Ultrasound CAR    Narrative · Proximal right internal carotid artery mild stenosis.  · Proximal left internal carotid artery mild stenosis.          Results for orders placed during the hospital encounter of 08/23/19   Adult Transthoracic Echo Complete W/ Cont if Necessary Per Protocol    Narrative · Estimated EF = 60%.  · Left ventricular systolic function is normal.       Indications  Syncope    Technically satisfactory study.  Mitral valve is structurally normal.  Tricuspid valve is structurally normal.  Aortic valve is structurally normal.  Pulmonic valve could not be well visualized.  No evidence for mitral tricuspid or aortic regurgitation is seen  by   Doppler study.  Left atrium is normal in size.  Right atrium is normal in size.  Left ventricle is normal in size and contractility with ejection fraction   of 60%.  Right ventricle is normal in size.  Atrial septum is intact.  Aorta is normal.  No pericardial effusion or intracardiac thrombus is seen.    Impression  Structurally and functionally normal cardiac valves.  Normal echo Doppler study.  Left ventricular ejection fraction is 60%.                   Test Results Pending at Discharge  Pending Labs     Order Current Status    Extra Tubes In process    Green Top (No Gel) In process            Procedures Performed           Consults:   Consults     Date and Time Order Name Status Description    2/5/2021 1531 Inpatient Gastroenterology Consult      2/4/2021 1723 Inpatient Psychiatrist Consult      2/4/2021 1227 Hospitalist (on-call MD unless specified) Completed             Discharge Details        Discharge Medications      Continue These Medications      Instructions Start Date   buprenorphine-naloxone 8-2 MG per SL tablet  Commonly known as: SUBOXONE   1 tablet, Sublingual, 3 Times Daily      busPIRone 30 MG tablet  Commonly known as: BUSPAR   30 mg, Oral, 2 Times Daily      cholecalciferol 10 MCG (400 UNIT) tablet  Commonly known as: VITAMIN D3   3,000 Units, Oral, Daily      dicyclomine 20 MG tablet  Commonly known as: BENTYL   20 mg, Oral, 3 Times Daily      docusate sodium 100 MG capsule  Commonly known as: COLACE   100 mg, Oral, 2 Times Daily      LaMICtal 100 MG tablet  Generic drug: lamoTRIgine   100 mg, Oral, Daily      OLANZapine 10 MG tablet  Commonly known as: zyPREXA   40 mg, Oral, Nightly      pantoprazole 40 MG EC tablet  Commonly known as: PROTONIX   40 mg, Oral, Daily      pramipexole 0.5 MG tablet  Commonly known as: MIRAPEX   0.5 mg, Oral, Nightly PRN      Rozerem 8 MG tablet  Generic drug: ramelteon   8 mg, Oral, Nightly      sodium bicarbonate 650 MG tablet   650 mg, Oral, 3 Times  "Daily      venlafaxine  MG 24 hr capsule  Commonly known as: EFFEXOR-XR   225 mg, Oral, Every Night at Bedtime             Allergies   Allergen Reactions   • Erythromycin Base Hives   • Lithium Other (See Comments)     States lithium caused \"all of her organs to shut down\"         Discharge Disposition:  Home or Self Care    Diet:  Hospital:  Diet Order   Procedures   • Diet Regular         Discharge Activity:         CODE STATUS:    Code Status and Medical Interventions:   Ordered at: 02/04/21 1326     Code Status:    CPR     Medical Interventions (Level of Support Prior to Arrest):    Full         Follow-up Appointments  No future appointments.    Additional Instructions for the Follow-ups that You Need to Schedule     Discharge Follow-up with PCP   As directed       Currently Documented PCP:    Provider, No Known    PCP Phone Number:    None     Follow Up Details: routine         Discharge Follow-up with Specialty: nephrology  and  GI; 2 Weeks   As directed      Specialty: nephrology  and  GI    Follow Up: 2 Weeks                 Condition on Discharge:      Stable      This patient has been examined wearing appropriate Personal Protective Equipment     Electronically signed by Judy Hilliard MD, 02/06/21, 10:17 AM EST.      Time: I spent 35  minutes on this discharge activity which included face-to-face encounter with the patient/reviewing the data in the system/coordination of the care with the nursing staff as well as consultants/documentation/entering orders.    "

## 2021-02-06 NOTE — PLAN OF CARE
Goal Outcome Evaluation:         Patient doing well. K level was still down after first round of protocol. Per Dr Hilliard, start the potassium protocol over. Will do that and continue to monitor

## 2021-02-08 NOTE — PROGRESS NOTES
Discharge Planning Assessment   Andrzej     Patient Name: Elizabeth Gómez  MRN: [id number]  Today's Date: 2/8/2021    Admit Date: 2/4/2021          Plan    Final Discharge Disposition Code  01 - home or self-care    Final Note  return home       Carol naegele rn  Case management  Office number [phone]  Cell phone [phone]

## 2021-02-09 LAB — QT INTERVAL: 385 MS

## 2022-02-22 ENCOUNTER — OFFICE VISIT (OUTPATIENT)
Dept: PODIATRY | Facility: CLINIC | Age: 47
End: 2022-02-22

## 2022-02-22 VITALS
BODY MASS INDEX: 20.14 KG/M2 | DIASTOLIC BLOOD PRESSURE: 66 MMHG | SYSTOLIC BLOOD PRESSURE: 100 MMHG | HEIGHT: 64 IN | HEART RATE: 103 BPM | WEIGHT: 118 LBS

## 2022-02-22 DIAGNOSIS — L85.2 PUNCTATE KERATOSIS: ICD-10-CM

## 2022-02-22 DIAGNOSIS — M79.672 BILATERAL FOOT PAIN: Primary | ICD-10-CM

## 2022-02-22 DIAGNOSIS — M79.671 BILATERAL FOOT PAIN: Primary | ICD-10-CM

## 2022-02-22 PROCEDURE — 17110 DESTRUCTION B9 LES UP TO 14: CPT | Performed by: PODIATRIST

## 2022-02-22 PROCEDURE — 99203 OFFICE O/P NEW LOW 30 MIN: CPT | Performed by: PODIATRIST

## 2022-02-22 NOTE — PROGRESS NOTES
"2022  Foot and Ankle Surgery - New Patient   Provider: Dr. Zeyad Guillen DPM  Location: [facility]    Subjective:  Elizabeth Gómez is a 46 y.o. female.     Chief Complaint   Patient presents with   • Left Foot - Pain, Callouses   • Right Foot - Pain, Callouses   • Initial Evaluation     no pcp       HPI: Patient is a 46-year-old female that presents with issues involving both feet.  She complains of prominent pain that she attributes to skin lesions on the plantar aspect of her feet.  She feels that these may be due to warts.  She has had these issues for a long period of time and states that she has not tried any conservative or formal treatments for care.  She is worried that the symptoms will progress and cause more issues.  She denies any injury or stepping on anything.  Patient has not seen a podiatrist in the past.  No other issues or concerns.    Allergies   Allergen Reactions   • Erythromycin Base Hives   • Lithium Other (See Comments)     States lithium caused \"all of her organs to shut down\"       Past Medical History:   Diagnosis Date   • ADHD    • Anxiety    • Bipolar 1 disorder (HCC)    • COPD (chronic obstructive pulmonary disease) ([facility])    • Depression    • Hep C w/o coma, chronic (HCC)    • PTSD (post-traumatic stress disorder)    • Substance abuse ([facility])    • Vitamin D deficiency        Past Surgical History:   Procedure Laterality Date   •  SECTION     • CHOLECYSTECTOMY     • COLONOSCOPY N/A 3/4/2020    Procedure: COLONOSCOPY;  Surgeon: Parker Lovell MD;  Location: [facility] ENDOSCOPY;  Service: Gastroenterology;  Laterality: N/A;  Post: aborted colon   • COLONOSCOPY N/A 3/5/2020    Procedure: COLONOSCOPY;  Surgeon: Parker Lovell MD;  Location: [facility] ENDOSCOPY;  Service: Gastroenterology;  Laterality: N/A;  poor prep   • DILATATION AND CURETTAGE      x2   • ENDOSCOPY N/A 3/4/2020    Procedure: ESOPHAGOGASTRODUODENOSCOPY with biopsy X2;  Surgeon: Parker Lovell " MD Major;  Location: [facility] ENDOSCOPY;  Service: Gastroenterology;  Laterality: N/A;  Post: flattened gastric folds, GERD   • TONSILLECTOMY         Family History   Problem Relation Age of Onset   • Heart disease Mother    • Heart disease Maternal Grandmother    • Heart disease Maternal Grandfather        Social History     Socioeconomic History   • Marital status: Single   Tobacco Use   • Smoking status: Current Every Day Smoker     Packs/day: 1.00     Types: Cigarettes   • Smokeless tobacco: Never Used   Vaping Use   • Vaping Use: Never used   Substance and Sexual Activity   • Alcohol use: No   • Drug use: No   • Sexual activity: Defer        Current Outpatient Medications on File Prior to Visit   Medication Sig Dispense Refill   • buprenorphine-naloxone (SUBOXONE) 8-2 MG per SL tablet Place 1 tablet under the tongue 3 (Three) Times a Day.     • busPIRone (BUSPAR) 30 MG tablet Take 30 mg by mouth 2 (Two) Times a Day.     • cholecalciferol (VITAMIN D3) 10 MCG (400 UNIT) tablet Take 3,000 Units by mouth Daily.     • dicyclomine (BENTYL) 20 MG tablet Take 20 mg by mouth 3 (Three) Times a Day.     • docusate sodium (COLACE) 100 MG capsule Take 100 mg by mouth 2 (Two) Times a Day.     • lamoTRIgine (LAMICTAL) 100 MG tablet Take 100 mg by mouth Daily.     • OLANZapine (zyPREXA) 10 MG tablet Take 40 mg by mouth Every Night.     • pantoprazole (PROTONIX) 40 MG EC tablet Take 40 mg by mouth Daily.     • pramipexole (MIRAPEX) 0.5 MG tablet Take 0.5 mg by mouth At Night As Needed.     • ramelteon (ROZEREM) 8 MG tablet Take 8 mg by mouth Every Night.     • sodium bicarbonate 650 MG tablet Take 650 mg by mouth 3 (Three) Times a Day.     • venlafaxine XR (EFFEXOR-XR) 150 MG 24 hr capsule Take 225 mg by mouth every night at bedtime.       No current facility-administered medications on file prior to visit.       Review of Systems:  General: Denies fever, chills, fatigue, and weakness.  Eyes: Denies vision loss, blurry vision,  "and excessive redness.  ENT: Denies hearing issues and difficulty swallowing.  Cardiovascular: Denies palpitations, chest pain, or syncopal episodes.  Respiratory: Denies shortness of breath, wheezing, and coughing.  GI: Denies abdominal pain, nausea, and vomiting.   : Denies frequency, hematuria, and urgency.  Musculoskeletal: Denies muscle cramps, joint pains, and stiffness.  Derm: + Bilateral foot skin lesions  Neuro: Denies headaches, numbness, loss of coordination, and tremors.  Psych: Denies anxiety and depression.  Endocrine: Denies temperature intolerance and changes in appetite.  Heme: Denies bleeding disorders or abnormal bruising.     Objective   /66   Pulse 103   Ht 162.6 cm (64\")   Wt 53.5 kg (118 lb)   BMI 20.25 kg/m²     Foot/Ankle Exam:       General:   Appearance: appears stated age and healthy    Orientation: AAOx3    Affect: appropriate    Gait: antalgic      VASCULAR      Right Foot Vascularity   Normal vascular exam    Dorsalis pedis:  2+  Posterior tibial:  2+  Skin Temperature: warm    Edema Grading:  None  CFT:  < 3 seconds  Pedal Hair Growth:  Present  Varicosities: none       Left Foot Vascularity   Normal vascular exam    Dorsalis pedis:  2+  Posterior tibial:  2+  Skin Temperature: warm    Edema Grading:  None  CFT:  < 3 seconds  Pedal Hair Growth:  Present  Varicosities: none        NEUROLOGIC     Right Foot Neurologic   Light touch sensation:  Normal  Hot/Cold sensation: normal    Achilles reflex:  2+     Left Foot Neurologic   Light touch sensation:  Normal  Hot/cold sensation: normal    Achilles reflex:  2+     MUSCULOSKELETAL      Right Foot Musculoskeletal   Ecchymosis:  None  Tenderness: right foot callus    Arch:  Normal     Left Foot Musculoskeletal   Ecchymosis:  None  Tenderness: left foot callus    Arch:  Normal     MUSCLE STRENGTH     Right Foot Muscle Strength   Normal strength    Foot dorsiflexion:  5  Foot plantar flexion:  5  Foot inversion:  5  Foot eversion: "  5     Left Foot Muscle Strength   Normal strength    Foot dorsiflexion:  5  Foot plantar flexion:  5  Foot inversion:  5  Foot eversion:  5     DERMATOLOGIC     Right Foot Dermatologic   Skin: corn    Skin: no right foot cellulitis and no right foot ulcer       Left Foot Dermatologic   Skin: corn    Skin: no left foot cellulitis and no left foot ulcer       TESTS     Right Foot Tests   Anterior drawer: negative    Varus tilt: negative       Left Foot Tests   Anterior drawer: negative    Varus tilt: negative       Image:       Right Foot Additional Comments Callus formation involving the plantar aspect of both feet.  Tenderness with palpation.  No gross deformity or instability to the feet.      Assessment/Plan   Diagnoses and all orders for this visit:    1. Bilateral foot pain (Primary)    2. Punctate keratosis      Patient presents with painful skin lesions involving the plantar aspect of both feet.  After evaluation, the lesions do appear to be highly consistent with punctate keratoses.  I did discuss the findings and treatment options with the patient at length.  I do feel that she would do well with Cantharone application under occlusion.  We reviewed the procedure and aftercare instructions at length.  Procedures were performed without complication.  I have asked that she monitor the lesions and call with any additional issues or concerns.  I would like to see her in 2 weeks for reevaluation.  Greater than 30 minutes was spent before, during, and after evaluation for patient care.    Cantharone application under occlusion: Both feet x3    Consent and time out was performed before proceeding with the procedure.  Debridement was performed with a 15 blade and Cantharone was applied in the standard fashion to the base of the lesion and occluded with moleskin.  We did review aftercare instructions.  Patient tolerated the procedure well.      No orders of the defined types were placed in this encounter.       Note  is dictated utilizing voice recognition software. Unfortunately this leads to occasional typographical errors. I apologize in advance if the situation occurs. If questions occur please do not hesitate to call our office.

## 2022-10-05 ENCOUNTER — APPOINTMENT (OUTPATIENT)
Dept: GENERAL RADIOLOGY | Facility: HOSPITAL | Age: 47
End: 2022-10-05

## 2022-10-05 ENCOUNTER — HOSPITAL ENCOUNTER (EMERGENCY)
Facility: HOSPITAL | Age: 47
Discharge: HOME | End: 2022-10-05
Attending: EMERGENCY MEDICINE | Admitting: EMERGENCY MEDICINE

## 2022-10-05 VITALS
BODY MASS INDEX: 19.5 KG/M2 | SYSTOLIC BLOOD PRESSURE: 130 MMHG | DIASTOLIC BLOOD PRESSURE: 61 MMHG | HEIGHT: 64 IN | WEIGHT: 114.2 LBS | RESPIRATION RATE: 18 BRPM | OXYGEN SATURATION: 97 % | TEMPERATURE: 97.6 F | HEART RATE: 94 BPM

## 2022-10-05 DIAGNOSIS — M79.672 LEFT FOOT PAIN: Primary | ICD-10-CM

## 2022-10-05 DIAGNOSIS — M25.572 ACUTE LEFT ANKLE PAIN: ICD-10-CM

## 2022-10-05 PROCEDURE — 73630 X-RAY EXAM OF FOOT: CPT

## 2022-10-05 PROCEDURE — 73610 X-RAY EXAM OF ANKLE: CPT

## 2022-10-05 PROCEDURE — 99283 EMERGENCY DEPT VISIT LOW MDM: CPT

## 2022-10-05 NOTE — ED PROVIDER NOTES
"Subjective   History of Present Illness  Patient presents with:  Foot Pain: Left foot pain for 2 days fell in hole states swelling      Provider, No Known   No LMP recorded. Patient is postmenopausal.  Patient is a 47-year-old who was sent to ED with a complaint of a left foot and ankle injury.  Patient where she stepped in a hole 2 days ago, and has since had worsening pain.  Pain is nonradiating.  She is minimal bear weight, but it is painful.  Aggravating factors include weightbearing, touching the area, relieving is nonweightbearing.  Pains described as a throbbing pain          Review of Systems   Constitutional: Negative for chills and fever.   Musculoskeletal:        Left foot and ankle pain     Skin: Negative for color change and wound.       Past Medical History:   Diagnosis Date   • ADHD    • Anxiety    • Bipolar 1 disorder ([facility])    • COPD (chronic obstructive pulmonary disease) ([facility])    • Depression    • Hep C w/o coma, chronic ([facility])    • PTSD (post-traumatic stress disorder)    • Substance abuse ([facility])    • Vitamin D deficiency        Allergies   Allergen Reactions   • Erythromycin Base Hives   • Lithium Other (See Comments)     States lithium caused \"all of her organs to shut down\"       Past Surgical History:   Procedure Laterality Date   •  SECTION     • CHOLECYSTECTOMY     • COLONOSCOPY N/A 3/4/2020    Procedure: COLONOSCOPY;  Surgeon: Parker Lovell MD;  Location: [facility] ENDOSCOPY;  Service: Gastroenterology;  Laterality: N/A;  Post: aborted colon   • COLONOSCOPY N/A 3/5/2020    Procedure: COLONOSCOPY;  Surgeon: Parker Lovell MD;  Location: [facility] ENDOSCOPY;  Service: Gastroenterology;  Laterality: N/A;  poor prep   • DILATATION AND CURETTAGE      x2   • ENDOSCOPY N/A 3/4/2020    Procedure: ESOPHAGOGASTRODUODENOSCOPY with biopsy X2;  Surgeon: Parker Lovell MD;  Location: [facility] ENDOSCOPY;  Service: Gastroenterology;  Laterality: N/A;  Post: flattened gastric folds, GERD " "  • TONSILLECTOMY         Family History   Problem Relation Age of Onset   • Heart disease Mother    • Heart disease Maternal Grandmother    • Heart disease Maternal Grandfather        Social History     Socioeconomic History   • Marital status: Single   Tobacco Use   • Smoking status: Current Every Day Smoker     Packs/day: 1.00     Types: Cigarettes   • Smokeless tobacco: Never Used   Vaping Use   • Vaping Use: Never used   Substance and Sexual Activity   • Alcohol use: No   • Drug use: No   • Sexual activity: Defer           Objective   Physical Exam  Vitals and nursing note reviewed.   Constitutional:       General: She is not in acute distress.     Appearance: Normal appearance. She is not ill-appearing, toxic-appearing or diaphoretic.   Cardiovascular:      Rate and Rhythm: Normal rate and regular rhythm.      Heart sounds: Normal heart sounds.   Musculoskeletal:      Left ankle: No swelling. No tenderness. Normal range of motion.      Left foot: Normal range of motion and normal capillary refill. Swelling and tenderness present. No deformity, bony tenderness or crepitus. Normal pulse.        Legs:       Comments: Bilateral pedal pulses 2+.  Cap refill brisk.  No skin changes appreciated.   Skin:     General: Skin is warm and dry.      Capillary Refill: Capillary refill takes less than 2 seconds.   Neurological:      Mental Status: She is alert and oriented to person, place, and time.         Procedures           ED Course      /61 (BP Location: Right arm, Patient Position: Sitting)   Pulse 94   Temp 97.6 °F (36.4 °C) (Oral)   Resp 18   Ht 161.3 cm (63.5\")   Wt 51.8 kg (114 lb 3.2 oz)   SpO2 97%   BMI 19.91 kg/m²   Labs Reviewed - No data to display  Medications - No data to display  XR Ankle 3+ View Left    Result Date: 10/5/2022  Soft tissue swelling over the lateral malleolus. No acute osseous abnormality noted  Healing fracture third metatarsal.  Electronically Signed By-Leoncio Rodriguez " On:10/5/2022 5:14 PM This report was finalized on [id number] by  Leoncio Rodriguez, .    XR Foot 3+ View Left    Result Date: 10/5/2022  Soft tissue swelling over the lateral malleolus. No acute osseous abnormality noted  Healing fracture third metatarsal.  Electronically Signed By-Leoncio Rodriguez On:10/5/2022 5:14 PM This report was finalized on [id number] by  Leoncio Rodriguez, .                                         [facility]  Differentials: Contusion, fracture, strain  Patient was brought back to the emergency department room for evaluation and placed on appropriate monitoring.  Patient with above exam and work-up after injury to her foot.  No acute fractures noted on x-ray, she does have soft tissue swelling.  Will be placed in Aircast, Ace wrap, given crutches and follow-up with Ortho.  Advised to be nonweightbearing.  I spoke with the patient at the bedside regarding their plan of care, discharge instruction, home care, prescriptions, indications to return to the emergency department, and importance follow-up.  We discussed test results at the bedside, including incidental abnormal labs, radiological findings, understands need for follow-up with primary care or specialist if indicated. Pt is aware that discharge does not mean that nothing is wrong but it indicates no emergency is present and they must continue care with follow-up as given below or physician of their choice    Note Disclaimer: At [facility], we believe that sharing information builds trust and better relationships. You are receiving this note because you recently visited [facility]. It is possible you will see health information before a provider has talked with you about it. This kind of information can be easy to misunderstand. To help you fully understand what it means for your health, we urge you to discuss this note with your provider.  Note dicatated utilizing Dragon Dictation.     Final diagnoses:   Left foot pain   Acute  left ankle pain       ED Disposition  ED Disposition     ED Disposition   Discharge    Condition   Stable    Comment   --             [facility] EMERGENCY DEPARTMENT  [address] [address] [phone]    As needed, If symptoms worsen    PATIENT CONNECTION - [facility] [address] [phone]  Schedule an appointment as soon as possible for a visit   Call for assistance with follow up with Primary care provider-call tomorrow.    Elieser Santana MD  [facility] [address] [phone]    Schedule an appointment as soon as possible for a visit            Medication List      No changes were made to your prescriptions during this visit.          Trina Paz, APRN  10/05/22 2017

## 2022-10-05 NOTE — DISCHARGE INSTRUCTIONS
Please follow-up with your primary care provider, if you not have a primary care provider please utilize patient connection above to establish care  Return to the ED for new or worsening symptoms  Follow up with Specialist if indicated above-call for an appointment  No weightbearing to left foot or ankle until cleared per primary care or Ortho

## 2023-09-27 ENCOUNTER — HOSPITAL ENCOUNTER (EMERGENCY)
Facility: HOSPITAL | Age: 48
Discharge: HOME | End: 2023-09-27
Attending: EMERGENCY MEDICINE
Payer: MEDICAID

## 2023-09-27 VITALS
HEIGHT: 63 IN | RESPIRATION RATE: 16 BRPM | TEMPERATURE: 98 F | HEART RATE: 113 BPM | SYSTOLIC BLOOD PRESSURE: 106 MMHG | OXYGEN SATURATION: 100 % | WEIGHT: 100 LBS | BODY MASS INDEX: 17.72 KG/M2 | DIASTOLIC BLOOD PRESSURE: 72 MMHG

## 2023-09-27 DIAGNOSIS — N39.0 ACUTE UTI: Primary | ICD-10-CM

## 2023-09-27 LAB
BACTERIA UR QL AUTO: ABNORMAL /HPF
BILIRUB UR QL STRIP: NEGATIVE
CLARITY UR: ABNORMAL
CLUE CELLS SPEC QL WET PREP: ABNORMAL
COLOR UR: YELLOW
GLUCOSE UR STRIP-MCNC: NEGATIVE MG/DL
HGB UR QL STRIP.AUTO: NEGATIVE
HYALINE CASTS UR QL AUTO: ABNORMAL /LPF
HYDATID CYST SPEC WET PREP: ABNORMAL
KETONES UR QL STRIP: ABNORMAL
LEUKOCYTE ESTERASE UR QL STRIP.AUTO: ABNORMAL
NITRITE UR QL STRIP: NEGATIVE
PH UR STRIP.AUTO: 6 [PH] (ref 5–8)
PROT UR QL STRIP: ABNORMAL
RBC # UR STRIP: ABNORMAL /HPF
REF LAB TEST METHOD: ABNORMAL
SP GR UR STRIP: 1.02 (ref 1–1.03)
SQUAMOUS #/AREA URNS HPF: ABNORMAL /HPF
T VAGINALIS SPEC QL WET PREP: ABNORMAL
UROBILINOGEN UR QL STRIP: ABNORMAL
WBC # UR STRIP: ABNORMAL /HPF
WBC SPEC QL WET PREP: ABNORMAL
YEAST GENITAL QL WET PREP: ABNORMAL

## 2023-09-27 PROCEDURE — 87070 CULTURE OTHR SPECIMN AEROBIC: CPT | Performed by: EMERGENCY MEDICINE

## 2023-09-27 PROCEDURE — 87086 URINE CULTURE/COLONY COUNT: CPT | Performed by: EMERGENCY MEDICINE

## 2023-09-27 PROCEDURE — 87210 SMEAR WET MOUNT SALINE/INK: CPT | Performed by: EMERGENCY MEDICINE

## 2023-09-27 PROCEDURE — 87077 CULTURE AEROBIC IDENTIFY: CPT | Performed by: EMERGENCY MEDICINE

## 2023-09-27 PROCEDURE — 25010000002 CEFTRIAXONE PER 250 MG: Performed by: EMERGENCY MEDICINE

## 2023-09-27 PROCEDURE — 36415 COLL VENOUS BLD VENIPUNCTURE: CPT

## 2023-09-27 PROCEDURE — 99283 EMERGENCY DEPT VISIT LOW MDM: CPT

## 2023-09-27 PROCEDURE — 81001 URINALYSIS AUTO W/SCOPE: CPT | Performed by: EMERGENCY MEDICINE

## 2023-09-27 PROCEDURE — 87205 SMEAR GRAM STAIN: CPT | Performed by: EMERGENCY MEDICINE

## 2023-09-27 PROCEDURE — 96372 THER/PROPH/DIAG INJ SC/IM: CPT

## 2023-09-27 PROCEDURE — 87185 SC STD ENZYME DETCJ PER NZM: CPT | Performed by: EMERGENCY MEDICINE

## 2023-09-27 PROCEDURE — 86592 SYPHILIS TEST NON-TREP QUAL: CPT | Performed by: EMERGENCY MEDICINE

## 2023-09-27 RX ORDER — DOXYCYCLINE 100 MG/1
100 CAPSULE ORAL 2 TIMES DAILY
Qty: 28 CAPSULE | Refills: 0 | Status: SHIPPED | OUTPATIENT
Start: 2023-09-27 | End: 2023-10-11

## 2023-09-27 RX ORDER — CEFDINIR 300 MG/1
300 CAPSULE ORAL 2 TIMES DAILY
Qty: 10 CAPSULE | Refills: 0 | Status: SHIPPED | OUTPATIENT
Start: 2023-09-27 | End: 2023-10-02

## 2023-09-27 RX ADMIN — LIDOCAINE HYDROCHLORIDE 500 MG: 10 INJECTION, SOLUTION EPIDURAL; INFILTRATION; INTRACAUDAL; PERINEURAL at 14:37

## 2023-09-27 NOTE — ED PROVIDER NOTES
"Subjective   History of Present Illness  Vaginal discharge and dysuria for several days.  Mild suprapubic pain.  Recently had new partner.  No fevers or chills.  States she is very anxious.  Review of Systems  Positive for vaginal discharge, dysuria, mild suprapubic pain.  Negative for fevers or chills.  Past Medical History:   Diagnosis Date    ADHD     Anxiety     Bipolar 1 disorder     COPD (chronic obstructive pulmonary disease)     Depression     Hep C w/o coma, chronic     PTSD (post-traumatic stress disorder)     Substance abuse     Vitamin D deficiency        Allergies   Allergen Reactions    Erythromycin Base Hives    Lithium Other (See Comments)     States lithium caused \"all of her organs to shut down\"       Past Surgical History:   Procedure Laterality Date     SECTION      CHOLECYSTECTOMY      COLONOSCOPY N/A 3/4/2020    Procedure: COLONOSCOPY;  Surgeon: Parker Lovell MD;  Location: [facility] ENDOSCOPY;  Service: Gastroenterology;  Laterality: N/A;  Post: aborted colon    COLONOSCOPY N/A 3/5/2020    Procedure: COLONOSCOPY;  Surgeon: Parker Lovell MD;  Location: [facility] ENDOSCOPY;  Service: Gastroenterology;  Laterality: N/A;  poor prep    DILATATION AND CURETTAGE      x2    ENDOSCOPY N/A 3/4/2020    Procedure: ESOPHAGOGASTRODUODENOSCOPY with biopsy X2;  Surgeon: Parker Lovell MD;  Location: [facility] ENDOSCOPY;  Service: Gastroenterology;  Laterality: N/A;  Post: flattened gastric folds, GERD    TONSILLECTOMY         Family History   Problem Relation Age of Onset    Heart disease Mother     Heart disease Maternal Grandmother     Heart disease Maternal Grandfather        Social History     Socioeconomic History    Marital status: Single   Tobacco Use    Smoking status: Every Day     Packs/day: 1.00     Types: Cigarettes    Smokeless tobacco: Never   Vaping Use    Vaping Use: Never used   Substance and Sexual Activity    Alcohol use: No    Drug use: No    Sexual activity: Defer " "          Objective   Physical Exam  Constitutional:  No acute distress.  Head:  Atraumatic.  Normocephalic.   Eyes:  No scleral icterus. Normal conjunctivae  ENT:  Moist mucosa.  No nasal discharge present.  Cardiovascular:  Well perfused.  Equal pulses.  Regular rhythm and tachycardic rate.  Normal capillary refill.    Pulmonary/Chest:  No respiratory distress.  Airway patent.  No tachypnea.  No accessory muscle usage.    Abdominal:  Nondistended. Nontender.   Extremities:  No peripheral edema.  No Deformity  : Yellow-green vaginal discharge present.  Mild cervical tenderness.  No adnexal tenderness.  Skin:  Warm, dry  Neurological:  Alert, awake, and appropriate.  Normal speech.      Procedures           ED Course      /72 (BP Location: Left arm, Patient Position: Lying)   Pulse 113   Temp 98 °F (36.7 °C) (Oral)   Resp 16   Ht 160 cm (63\")   Wt 45.4 kg (100 lb)   SpO2 100%   BMI 17.71 kg/m²   Labs Reviewed   WET PREP, GENITAL - Abnormal; Notable for the following components:       Result Value    WBC'S 3+ WBC's seen (*)     All other components within normal limits   URINALYSIS W/ CULTURE IF INDICATED - Abnormal; Notable for the following components:    Appearance, UA Cloudy (*)     Ketones, UA 15 mg/dL (1+) (*)     Protein,  mg/dL (2+) (*)     Leuk Esterase, UA Large (3+) (*)     All other components within normal limits    Narrative:     In absence of clinical symptoms, the presence of pyuria, bacteria, and/or nitrites on the urinalysis result does not correlate with infection.   URINALYSIS, MICROSCOPIC ONLY - Abnormal; Notable for the following components:    RBC, UA 0-2 (*)     WBC, UA Too Numerous to Count (*)     Bacteria, UA 2+ (*)     Squamous Epithelial Cells, UA 3-6 (*)     All other components within normal limits   GENITAL CULTURE   CHLAMYDIA TRACHOMATIS, NEISSERIA GONORRHOEAE, PCR   URINE CULTURE   RPR     Medications   cefTRIAXone (ROCEPHIN) 350 mg/ml in lidocaine 1% IM syringe " (1 gm vial) (500 mg Intramuscular Given 9/27/23 6487)     No radiology results for the last day                                       Medical Decision Making  Problems Addressed:  Acute UTI: complicated acute illness or injury    Risk  Prescription drug management.    High suspicion for STI given exam.  Given Rocephin and Doxy prescription.  We will also treat for UTI.  Exam reassuring.  Very low suspicion for tubo-ovarian abscess based on exam.  Will DC home.    Final diagnoses:   Acute UTI       ED Disposition  ED Disposition       ED Disposition   Discharge    Condition   Stable    Comment   --               PATIENT CONNECTION - [place] [address] [phone]  In 3 days           Medication List        New Prescriptions      cefdinir 300 MG capsule  Commonly known as: OMNICEF  Take 1 capsule by mouth 2 (Two) Times a Day for 5 days.     doxycycline 100 MG capsule  Commonly known as: MONODOX  Take 1 capsule by mouth 2 (Two) Times a Day for 14 days.               Where to Get Your Medications        These medications were sent to [id number] - [place], [place] - [address] [facility] [address] - [phone] [place] [phone]   [address] [place] [address]      Phone: [phone]   cefdinir 300 MG capsule  doxycycline 100 MG capsule            José Miguel Fernandez MD  09/27/23 4309

## 2023-09-28 LAB
BACTERIA SPEC AEROBE CULT: NO GROWTH
RPR SER QL: NORMAL

## 2023-09-30 ENCOUNTER — TELEPHONE (OUTPATIENT)
Dept: PHARMACY | Facility: HOSPITAL | Age: 48
End: 2023-09-30
Payer: MEDICAID

## 2023-09-30 LAB
BACTERIA SPEC AEROBE CULT: ABNORMAL
BACTERIA SPEC AEROBE CULT: ABNORMAL
GRAM STN SPEC: ABNORMAL
GRAM STN SPEC: ABNORMAL

## 2023-09-30 NOTE — TELEPHONE ENCOUNTER
Attempted to reach patient regarding culture results. No answer, mailbox full, unable to leave a VM. Will attempt to call again.    Soumya Guerra PharmD  09/30/23 15:55 EDT]

## 2023-09-30 NOTE — PROGRESS NOTES
Patient vaginal culture resulted with  gardnerella vaginalis . Patient was treated for STI with doxy and IM rocephin. Rx for cefdinir for UT. Patient was not given a Rx at time of discharge for BV. Discussed with Gaviota LEIJA who authorized Rx for metronidazole 500 mg PO BID x 7 days will be sent to pharmacy once identified. Also instructed to have patient take probiotic while on antibiotics. Attempted to reach patient regarding cultures, no answer and unable to leave VM. Will attempt to reach patient tomorrow.       Microbiology Results (last 10 days)       Procedure Component Value - Date/Time    Wet Prep, Genital - Swab, Vagina [[id number]]  (Abnormal) Collected: 09/27/23 1411    Lab Status: Final result Specimen: Swab from Vagina Updated: 09/27/23 1427     YEAST No yeast seen     HYPHAL ELEMENTS No Hyphal elements seen     WBC'S 3+ WBC's seen     Clue Cells, Wet Prep No Clue cells seen     Trichomonas, Wet Prep No Trichomonas seen    Genital Culture - Swab, Cervix [[id number]]  (Abnormal) Collected: 09/27/23 1411    Lab Status: Final result Specimen: Swab from Cervix Updated: 09/30/23 1420     Genital Culture Moderate growth (3+) Gardnerella vaginalis     Comment: Beta lactamase negative.         Rare Normal Skin Morena     Gram Stain Moderate (3+) WBCs seen      Few (2+) Gram negative bacilli    Urine Culture - Urine, Urine, Clean Catch [[id number]]  (Normal) Collected: 09/27/23 1402    Lab Status: Final result Specimen: Urine, Clean Catch Updated: 09/28/23 2032     Urine Culture No growth            Soumya Guerra RPH  9/30/2023 15:56 EDT

## 2023-10-01 ENCOUNTER — TELEPHONE (OUTPATIENT)
Dept: PHARMACY | Facility: HOSPITAL | Age: 48
End: 2023-10-01
Payer: MEDICAID

## 2023-10-01 NOTE — TELEPHONE ENCOUNTER
Attempted to reach patient regarding cultures for second day in a row. No answer, unable to leave VM as mailbox is full. Will attempt to call back tomorrow.    Soumya Guerra PharmD  10/01/23 12:46 EDT

## 2023-10-02 NOTE — PROGRESS NOTES
Patient vaginal culture resulted with  gardnerella vaginalis . Patient was treated for STI with doxy and IM rocephin. Rx for cefdinir for UT. Patient was not given a Rx at time of discharge for BV. Discussed with Gaviota LEIJA who authorized Rx for metronidazole 500 mg PO BID x 7 days will be sent to pharmacy once identified. Also instructed to have patient take probiotic while on antibiotics. Attempted to reach patient regarding cultures, no answer and unable to leave V x 3 days. At this point will consider the patient lost to follow up.        Microbiology Results (last 10 days)       Procedure Component Value - Date/Time    Wet Prep, Genital - Swab, Vagina [[id number]]  (Abnormal) Collected: 09/27/23 1411    Lab Status: Final result Specimen: Swab from Vagina Updated: 09/27/23 1427     YEAST No yeast seen     HYPHAL ELEMENTS No Hyphal elements seen     WBC'S 3+ WBC's seen     Clue Cells, Wet Prep No Clue cells seen     Trichomonas, Wet Prep No Trichomonas seen    Genital Culture - Swab, Cervix [[id number]]  (Abnormal) Collected: 09/27/23 1411    Lab Status: Final result Specimen: Swab from Cervix Updated: 09/30/23 1420     Genital Culture Moderate growth (3+) Gardnerella vaginalis     Comment: Beta lactamase negative.         Rare Normal Skin Morena     Gram Stain Moderate (3+) WBCs seen      Few (2+) Gram negative bacilli    Urine Culture - Urine, Urine, Clean Catch [[id number]]  (Normal) Collected: 09/27/23 1402    Lab Status: Final result Specimen: Urine, Clean Catch Updated: 09/28/23 2032     Urine Culture No growth            Eda Tran, PharmD  10/2/2023 11:24 EDT       Pt with fair PO intake in house, complaining food is cold at meal times, RN reheating. Pt amenable to provision of extra cold sandwich and Greek yogurt, declining Glucerna as causes GI distress.

## 2025-08-28 ENCOUNTER — HOSPITAL ENCOUNTER (OUTPATIENT)
Facility: HOSPITAL | Age: 50
Setting detail: OBSERVATION
Discharge: HOME | End: 2025-08-29
Attending: EMERGENCY MEDICINE | Admitting: STUDENT IN AN ORGANIZED HEALTH CARE EDUCATION/TRAINING PROGRAM
Payer: MEDICAID

## 2025-08-28 DIAGNOSIS — B18.2 HEP C W/O COMA, CHRONIC: Chronic | ICD-10-CM

## 2025-08-28 DIAGNOSIS — F43.10 PTSD (POST-TRAUMATIC STRESS DISORDER): Chronic | ICD-10-CM

## 2025-08-28 DIAGNOSIS — R41.82 ALTERED MENTAL STATUS, UNSPECIFIED ALTERED MENTAL STATUS TYPE: Primary | ICD-10-CM

## 2025-08-28 LAB
ALBUMIN SERPL-MCNC: 3.7 G/DL (ref 3.5–5.2)
ALBUMIN/GLOB SERPL: 1.9 G/DL
ALP SERPL-CCNC: 205 U/L (ref 39–117)
ALT SERPL W P-5'-P-CCNC: 79 U/L (ref 1–33)
AMPHET+METHAMPHET UR QL: NEGATIVE
AMPHETAMINES UR QL: NEGATIVE
ANION GAP SERPL CALCULATED.3IONS-SCNC: 8.4 MMOL/L (ref 5–15)
APAP SERPL-MCNC: <5 MCG/ML (ref 0–30)
AST SERPL-CCNC: 49 U/L (ref 1–32)
BARBITURATES UR QL SCN: NEGATIVE
BASOPHILS # BLD AUTO: 0.04 10*3/MM3 (ref 0–0.2)
BASOPHILS NFR BLD AUTO: 0.7 % (ref 0–1.5)
BENZODIAZ UR QL SCN: NEGATIVE
BILIRUB SERPL-MCNC: 0.2 MG/DL (ref 0–1.2)
BUN SERPL-MCNC: 10.1 MG/DL (ref 6–20)
BUN/CREAT SERPL: 11.6 (ref 7–25)
BUPRENORPHINE SERPL-MCNC: POSITIVE NG/ML
CALCIUM SPEC-SCNC: 8 MG/DL (ref 8.6–10.5)
CANNABINOIDS SERPL QL: NEGATIVE
CHLORIDE SERPL-SCNC: 111 MMOL/L (ref 98–107)
CO2 SERPL-SCNC: 20.6 MMOL/L (ref 22–29)
COCAINE UR QL: NEGATIVE
CREAT SERPL-MCNC: 0.87 MG/DL (ref 0.57–1)
DEPRECATED RDW RBC AUTO: 46.7 FL (ref 37–54)
EGFRCR SERPLBLD CKD-EPI 2021: 82.3 ML/MIN/1.73
EOSINOPHIL # BLD AUTO: 0.09 10*3/MM3 (ref 0–0.4)
EOSINOPHIL NFR BLD AUTO: 1.5 % (ref 0.3–6.2)
ERYTHROCYTE [DISTWIDTH] IN BLOOD BY AUTOMATED COUNT: 13.8 % (ref 12.3–15.4)
ETHANOL UR QL: <0.01 %
GLOBULIN UR ELPH-MCNC: 2 GM/DL
GLUCOSE SERPL-MCNC: 104 MG/DL (ref 65–99)
HCT VFR BLD AUTO: 34.9 % (ref 34–46.6)
HGB BLD-MCNC: 10.7 G/DL (ref 12–15.9)
IMM GRANULOCYTES # BLD AUTO: 0.02 10*3/MM3 (ref 0–0.05)
IMM GRANULOCYTES NFR BLD AUTO: 0.3 % (ref 0–0.5)
LYMPHOCYTES # BLD AUTO: 2.29 10*3/MM3 (ref 0.7–3.1)
LYMPHOCYTES NFR BLD AUTO: 38.2 % (ref 19.6–45.3)
MCH RBC QN AUTO: 28.3 PG (ref 26.6–33)
MCHC RBC AUTO-ENTMCNC: 30.7 G/DL (ref 31.5–35.7)
MCV RBC AUTO: 92.3 FL (ref 79–97)
METHADONE UR QL SCN: NEGATIVE
MONOCYTES # BLD AUTO: 0.59 10*3/MM3 (ref 0.1–0.9)
MONOCYTES NFR BLD AUTO: 9.8 % (ref 5–12)
NEUTROPHILS NFR BLD AUTO: 2.96 10*3/MM3 (ref 1.7–7)
NEUTROPHILS NFR BLD AUTO: 49.5 % (ref 42.7–76)
NRBC BLD AUTO-RTO: 0 /100 WBC (ref 0–0.2)
OPIATES UR QL: NEGATIVE
OXYCODONE UR QL SCN: NEGATIVE
PCP UR QL SCN: NEGATIVE
PLATELET # BLD AUTO: 199 10*3/MM3 (ref 140–450)
PMV BLD AUTO: 9.3 FL (ref 6–12)
POTASSIUM SERPL-SCNC: 3.8 MMOL/L (ref 3.5–5.2)
PROT SERPL-MCNC: 5.7 G/DL (ref 6–8.5)
RBC # BLD AUTO: 3.78 10*6/MM3 (ref 3.77–5.28)
SALICYLATES SERPL-MCNC: <0.5 MG/DL
SODIUM SERPL-SCNC: 140 MMOL/L (ref 136–145)
TRICYCLICS UR QL SCN: NEGATIVE
WBC NRBC COR # BLD AUTO: 5.99 10*3/MM3 (ref 3.4–10.8)

## 2025-08-28 PROCEDURE — 25010000002 NALOXONE HCL 2 MG/2ML SOLUTION PREFILLED SYRINGE

## 2025-08-28 PROCEDURE — 85025 COMPLETE CBC W/AUTO DIFF WBC: CPT | Performed by: EMERGENCY MEDICINE

## 2025-08-28 PROCEDURE — 93005 ELECTROCARDIOGRAM TRACING: CPT | Performed by: EMERGENCY MEDICINE

## 2025-08-28 PROCEDURE — 80179 DRUG ASSAY SALICYLATE: CPT | Performed by: EMERGENCY MEDICINE

## 2025-08-28 PROCEDURE — 81003 URINALYSIS AUTO W/O SCOPE: CPT

## 2025-08-28 PROCEDURE — 80306 DRUG TEST PRSMV INSTRMNT: CPT | Performed by: EMERGENCY MEDICINE

## 2025-08-28 PROCEDURE — 82077 ASSAY SPEC XCP UR&BREATH IA: CPT | Performed by: EMERGENCY MEDICINE

## 2025-08-28 PROCEDURE — 80053 COMPREHEN METABOLIC PANEL: CPT | Performed by: EMERGENCY MEDICINE

## 2025-08-28 PROCEDURE — 80143 DRUG ASSAY ACETAMINOPHEN: CPT | Performed by: EMERGENCY MEDICINE

## 2025-08-28 RX ORDER — NALOXONE HYDROCHLORIDE 1 MG/ML
INJECTION INTRAMUSCULAR; INTRAVENOUS; SUBCUTANEOUS
Status: COMPLETED
Start: 2025-08-28 | End: 2025-08-28

## 2025-08-28 RX ADMIN — NALOXONE HYDROCHLORIDE 2 MG: 1 INJECTION INTRAMUSCULAR; INTRAVENOUS; SUBCUTANEOUS at 23:44

## 2025-08-29 ENCOUNTER — APPOINTMENT (OUTPATIENT)
Dept: ULTRASOUND IMAGING | Facility: HOSPITAL | Age: 50
End: 2025-08-29
Payer: MEDICAID

## 2025-08-29 ENCOUNTER — APPOINTMENT (OUTPATIENT)
Dept: CT IMAGING | Facility: HOSPITAL | Age: 50
End: 2025-08-29
Payer: MEDICAID

## 2025-08-29 VITALS
RESPIRATION RATE: 11 BRPM | BODY MASS INDEX: 25.12 KG/M2 | DIASTOLIC BLOOD PRESSURE: 55 MMHG | OXYGEN SATURATION: 92 % | HEIGHT: 63 IN | HEART RATE: 74 BPM | TEMPERATURE: 98.2 F | WEIGHT: 141.76 LBS | SYSTOLIC BLOOD PRESSURE: 87 MMHG

## 2025-08-29 PROBLEM — R41.82 ALTERED MENTAL STATUS: Status: ACTIVE | Noted: 2025-08-29

## 2025-08-29 LAB
AMMONIA BLD-SCNC: 81 UMOL/L (ref 11–51)
BILIRUB UR QL STRIP: NEGATIVE
CLARITY UR: CLEAR
COLOR UR: YELLOW
GLUCOSE BLDC GLUCOMTR-MCNC: 97 MG/DL (ref 70–105)
GLUCOSE UR STRIP-MCNC: NEGATIVE MG/DL
HAV IGM SERPL QL IA: ABNORMAL
HBV CORE IGM SERPL QL IA: ABNORMAL
HBV SURFACE AG SERPL QL IA: ABNORMAL
HCV AB SER QL: REACTIVE
HGB UR QL STRIP.AUTO: NEGATIVE
KETONES UR QL STRIP: NEGATIVE
LEUKOCYTE ESTERASE UR QL STRIP.AUTO: NEGATIVE
NITRITE UR QL STRIP: NEGATIVE
PH UR STRIP.AUTO: 7 [PH] (ref 5–8)
PROT UR QL STRIP: NEGATIVE
QT INTERVAL: 379 MS
QTC INTERVAL: 460 MS
SP GR UR STRIP: 1.01 (ref 1–1.03)
UROBILINOGEN UR QL STRIP: NORMAL

## 2025-08-29 PROCEDURE — 25810000003 SODIUM CHLORIDE 0.9 % SOLUTION

## 2025-08-29 PROCEDURE — 80074 ACUTE HEPATITIS PANEL: CPT

## 2025-08-29 PROCEDURE — 25010000002 NALOXONE HCL 2 MG/2ML SOLUTION PREFILLED SYRINGE: Performed by: EMERGENCY MEDICINE

## 2025-08-29 PROCEDURE — 76705 ECHO EXAM OF ABDOMEN: CPT

## 2025-08-29 PROCEDURE — 82948 REAGENT STRIP/BLOOD GLUCOSE: CPT

## 2025-08-29 PROCEDURE — G0378 HOSPITAL OBSERVATION PER HR: HCPCS

## 2025-08-29 PROCEDURE — 70450 CT HEAD/BRAIN W/O DYE: CPT

## 2025-08-29 PROCEDURE — 82140 ASSAY OF AMMONIA: CPT

## 2025-08-29 RX ORDER — BUPRENORPHINE HYDROCHLORIDE AND NALOXONE HYDROCHLORIDE DIHYDRATE 8; 2 MG/1; MG/1
8 TABLET SUBLINGUAL DAILY
Status: DISCONTINUED | OUTPATIENT
Start: 2025-08-29 | End: 2025-08-29

## 2025-08-29 RX ORDER — BUPRENORPHINE HYDROCHLORIDE AND NALOXONE HYDROCHLORIDE DIHYDRATE 8; 2 MG/1; MG/1
4 TABLET SUBLINGUAL
Status: DISCONTINUED | OUTPATIENT
Start: 2025-08-29 | End: 2025-08-29 | Stop reason: HOSPADM

## 2025-08-29 RX ORDER — GABAPENTIN 400 MG/1
800 CAPSULE ORAL EVERY 8 HOURS SCHEDULED
Status: DISCONTINUED | OUTPATIENT
Start: 2025-08-29 | End: 2025-08-29 | Stop reason: HOSPADM

## 2025-08-29 RX ORDER — POLYETHYLENE GLYCOL 3350 17 G/17G
17 POWDER, FOR SOLUTION ORAL DAILY PRN
Status: DISCONTINUED | OUTPATIENT
Start: 2025-08-29 | End: 2025-08-29 | Stop reason: HOSPADM

## 2025-08-29 RX ORDER — ZOLPIDEM TARTRATE 10 MG/1
10 TABLET ORAL NIGHTLY PRN
COMMUNITY

## 2025-08-29 RX ORDER — BUPRENORPHINE HYDROCHLORIDE AND NALOXONE HYDROCHLORIDE DIHYDRATE 8; 2 MG/1; MG/1
0.5 TABLET SUBLINGUAL
COMMUNITY

## 2025-08-29 RX ORDER — NALOXONE HYDROCHLORIDE 1 MG/ML
2 INJECTION INTRAMUSCULAR; INTRAVENOUS; SUBCUTANEOUS ONCE
Status: COMPLETED | OUTPATIENT
Start: 2025-08-29 | End: 2025-08-29

## 2025-08-29 RX ORDER — SUMATRIPTAN 50 MG/1
50 TABLET, FILM COATED ORAL
COMMUNITY

## 2025-08-29 RX ORDER — LACTULOSE 10 G/15ML
30 SOLUTION ORAL ONCE
Status: COMPLETED | OUTPATIENT
Start: 2025-08-29 | End: 2025-08-29

## 2025-08-29 RX ORDER — OLANZAPINE 10 MG/1
40 TABLET, FILM COATED ORAL NIGHTLY
Status: DISCONTINUED | OUTPATIENT
Start: 2025-08-29 | End: 2025-08-29 | Stop reason: HOSPADM

## 2025-08-29 RX ORDER — BISACODYL 10 MG
10 SUPPOSITORY, RECTAL RECTAL DAILY PRN
Status: DISCONTINUED | OUTPATIENT
Start: 2025-08-29 | End: 2025-08-29 | Stop reason: HOSPADM

## 2025-08-29 RX ORDER — BISACODYL 5 MG/1
5 TABLET, DELAYED RELEASE ORAL DAILY PRN
Status: DISCONTINUED | OUTPATIENT
Start: 2025-08-29 | End: 2025-08-29 | Stop reason: HOSPADM

## 2025-08-29 RX ORDER — BACLOFEN 10 MG/1
10 TABLET ORAL 3 TIMES DAILY PRN
COMMUNITY
End: 2025-08-29 | Stop reason: HOSPADM

## 2025-08-29 RX ORDER — BACLOFEN 10 MG/1
10 TABLET ORAL 3 TIMES DAILY PRN
Status: DISCONTINUED | OUTPATIENT
Start: 2025-08-29 | End: 2025-08-29 | Stop reason: HOSPADM

## 2025-08-29 RX ORDER — LACTULOSE 10 G/15ML
300 SOLUTION ORAL ONCE
Status: DISCONTINUED | OUTPATIENT
Start: 2025-08-29 | End: 2025-08-29 | Stop reason: HOSPADM

## 2025-08-29 RX ORDER — DEXTROAMPHETAMINE SACCHARATE, AMPHETAMINE ASPARTATE MONOHYDRATE, DEXTROAMPHETAMINE SULFATE AND AMPHETAMINE SULFATE 7.5; 7.5; 7.5; 7.5 MG/1; MG/1; MG/1; MG/1
30 CAPSULE, EXTENDED RELEASE ORAL EVERY MORNING
COMMUNITY

## 2025-08-29 RX ORDER — ZOLPIDEM TARTRATE 5 MG/1
5 TABLET ORAL NIGHTLY PRN
Status: DISCONTINUED | OUTPATIENT
Start: 2025-08-29 | End: 2025-08-29 | Stop reason: HOSPADM

## 2025-08-29 RX ORDER — VENLAFAXINE HYDROCHLORIDE 75 MG/1
75 CAPSULE, EXTENDED RELEASE ORAL
Status: DISCONTINUED | OUTPATIENT
Start: 2025-08-29 | End: 2025-08-29 | Stop reason: HOSPADM

## 2025-08-29 RX ORDER — ONDANSETRON 2 MG/ML
4 INJECTION INTRAMUSCULAR; INTRAVENOUS EVERY 6 HOURS PRN
Status: DISCONTINUED | OUTPATIENT
Start: 2025-08-29 | End: 2025-08-29 | Stop reason: HOSPADM

## 2025-08-29 RX ORDER — GABAPENTIN 800 MG/1
800 TABLET ORAL 3 TIMES DAILY
COMMUNITY

## 2025-08-29 RX ORDER — BUPRENORPHINE HYDROCHLORIDE AND NALOXONE HYDROCHLORIDE DIHYDRATE 8; 2 MG/1; MG/1
8 TABLET SUBLINGUAL 2 TIMES DAILY
Status: DISCONTINUED | OUTPATIENT
Start: 2025-08-29 | End: 2025-08-29 | Stop reason: HOSPADM

## 2025-08-29 RX ORDER — AMOXICILLIN 250 MG
2 CAPSULE ORAL 2 TIMES DAILY PRN
Status: DISCONTINUED | OUTPATIENT
Start: 2025-08-29 | End: 2025-08-29 | Stop reason: HOSPADM

## 2025-08-29 RX ORDER — VARENICLINE TARTRATE 0.5 (11)-1
KIT ORAL 2 TIMES DAILY
COMMUNITY
Start: 2025-08-27

## 2025-08-29 RX ORDER — LAMOTRIGINE 100 MG/1
100 TABLET ORAL 2 TIMES DAILY
Status: DISCONTINUED | OUTPATIENT
Start: 2025-08-29 | End: 2025-08-29 | Stop reason: HOSPADM

## 2025-08-29 RX ORDER — PRAMIPEXOLE DIHYDROCHLORIDE 1 MG/1
2 TABLET ORAL NIGHTLY PRN
Status: DISCONTINUED | OUTPATIENT
Start: 2025-08-29 | End: 2025-08-29 | Stop reason: HOSPADM

## 2025-08-29 RX ADMIN — SODIUM CHLORIDE 1000 ML: 9 INJECTION, SOLUTION INTRAVENOUS at 01:54

## 2025-08-29 RX ADMIN — NALOXONE HYDROCHLORIDE 2 MG: 1 INJECTION INTRAMUSCULAR; INTRAVENOUS; SUBCUTANEOUS at 00:07

## 2025-08-29 RX ADMIN — LACTULOSE SOLUTION USP, 10 G/15 ML 30 G: 10 SOLUTION ORAL; RECTAL at 05:22

## (undated) DEVICE — PAPR PRNT PK SONY W RIBN UPC55

## (undated) DEVICE — PK ENDO GI 50

## (undated) DEVICE — BITEBLOCK ENDO W/STRAP 60F A/ LF DISP

## (undated) DEVICE — SINGLE-USE BIOPSY FORCEPS: Brand: RADIAL JAW 4